# Patient Record
Sex: FEMALE | Race: WHITE | NOT HISPANIC OR LATINO | URBAN - METROPOLITAN AREA
[De-identification: names, ages, dates, MRNs, and addresses within clinical notes are randomized per-mention and may not be internally consistent; named-entity substitution may affect disease eponyms.]

---

## 2019-07-22 ENCOUNTER — EMERGENCY (EMERGENCY)
Facility: HOSPITAL | Age: 79
LOS: 1 days | Discharge: HOME | End: 2019-07-22
Attending: INTERNAL MEDICINE | Admitting: INTERNAL MEDICINE
Payer: MEDICARE

## 2019-07-22 VITALS
RESPIRATION RATE: 20 BRPM | DIASTOLIC BLOOD PRESSURE: 53 MMHG | HEART RATE: 87 BPM | OXYGEN SATURATION: 96 % | SYSTOLIC BLOOD PRESSURE: 114 MMHG

## 2019-07-22 VITALS
DIASTOLIC BLOOD PRESSURE: 74 MMHG | RESPIRATION RATE: 18 BRPM | TEMPERATURE: 98 F | HEART RATE: 97 BPM | OXYGEN SATURATION: 93 % | WEIGHT: 130.07 LBS | SYSTOLIC BLOOD PRESSURE: 154 MMHG

## 2019-07-22 LAB
ALBUMIN SERPL ELPH-MCNC: 4 G/DL — SIGNIFICANT CHANGE UP (ref 3.3–5)
ALP SERPL-CCNC: 94 U/L — SIGNIFICANT CHANGE UP (ref 40–120)
ALT FLD-CCNC: 24 U/L DA — SIGNIFICANT CHANGE UP (ref 10–45)
ANION GAP SERPL CALC-SCNC: 9 MMOL/L — SIGNIFICANT CHANGE UP (ref 5–17)
APTT BLD: 28 SEC — SIGNIFICANT CHANGE UP (ref 27.5–36.3)
AST SERPL-CCNC: 22 U/L — SIGNIFICANT CHANGE UP (ref 10–40)
BASOPHILS # BLD AUTO: 0.04 K/UL — SIGNIFICANT CHANGE UP (ref 0–0.2)
BASOPHILS NFR BLD AUTO: 0.3 % — SIGNIFICANT CHANGE UP (ref 0–2)
BILIRUB SERPL-MCNC: 0.6 MG/DL — SIGNIFICANT CHANGE UP (ref 0.2–1.2)
BUN SERPL-MCNC: 18 MG/DL — SIGNIFICANT CHANGE UP (ref 7–23)
CALCIUM SERPL-MCNC: 10.2 MG/DL — SIGNIFICANT CHANGE UP (ref 8.4–10.5)
CHLORIDE SERPL-SCNC: 105 MMOL/L — SIGNIFICANT CHANGE UP (ref 96–108)
CO2 SERPL-SCNC: 27 MMOL/L — SIGNIFICANT CHANGE UP (ref 22–31)
CREAT SERPL-MCNC: 0.83 MG/DL — SIGNIFICANT CHANGE UP (ref 0.5–1.3)
EOSINOPHIL # BLD AUTO: 0.11 K/UL — SIGNIFICANT CHANGE UP (ref 0–0.5)
EOSINOPHIL NFR BLD AUTO: 0.8 % — SIGNIFICANT CHANGE UP (ref 0–6)
GLUCOSE SERPL-MCNC: 109 MG/DL — HIGH (ref 70–99)
HCT VFR BLD CALC: 33.8 % — LOW (ref 34.5–45)
HGB BLD-MCNC: 12.2 G/DL — SIGNIFICANT CHANGE UP (ref 11.5–15.5)
IMM GRANULOCYTES NFR BLD AUTO: 0.4 % — SIGNIFICANT CHANGE UP (ref 0–1.5)
INR BLD: 1.06 RATIO — SIGNIFICANT CHANGE UP (ref 0.88–1.16)
LYMPHOCYTES # BLD AUTO: 27.3 % — SIGNIFICANT CHANGE UP (ref 13–44)
LYMPHOCYTES # BLD AUTO: 3.76 K/UL — HIGH (ref 1–3.3)
MCHC RBC-ENTMCNC: 36.1 GM/DL — HIGH (ref 32–36)
MCHC RBC-ENTMCNC: 41.1 PG — HIGH (ref 27–34)
MCV RBC AUTO: 113.8 FL — HIGH (ref 80–100)
MONOCYTES # BLD AUTO: 1.54 K/UL — HIGH (ref 0–0.9)
MONOCYTES NFR BLD AUTO: 11.2 % — SIGNIFICANT CHANGE UP (ref 2–14)
NEUTROPHILS # BLD AUTO: 8.25 K/UL — HIGH (ref 1.8–7.4)
NEUTROPHILS NFR BLD AUTO: 60 % — SIGNIFICANT CHANGE UP (ref 43–77)
NRBC # BLD: 0 /100 WBCS — SIGNIFICANT CHANGE UP (ref 0–0)
PLATELET # BLD AUTO: 399 K/UL — SIGNIFICANT CHANGE UP (ref 150–400)
POTASSIUM SERPL-MCNC: 3.6 MMOL/L — SIGNIFICANT CHANGE UP (ref 3.5–5.3)
POTASSIUM SERPL-SCNC: 3.6 MMOL/L — SIGNIFICANT CHANGE UP (ref 3.5–5.3)
PROT SERPL-MCNC: 8 G/DL — SIGNIFICANT CHANGE UP (ref 6–8.3)
PROTHROM AB SERPL-ACNC: 11.9 SEC — SIGNIFICANT CHANGE UP (ref 10–12.9)
RBC # BLD: 2.97 M/UL — LOW (ref 3.8–5.2)
RBC # FLD: 14.5 % — SIGNIFICANT CHANGE UP (ref 10.3–14.5)
SODIUM SERPL-SCNC: 141 MMOL/L — SIGNIFICANT CHANGE UP (ref 135–145)
TROPONIN I SERPL-MCNC: <.017 NG/ML — LOW (ref 0.02–0.06)
WBC # BLD: 13.76 K/UL — HIGH (ref 3.8–10.5)
WBC # FLD AUTO: 13.76 K/UL — HIGH (ref 3.8–10.5)

## 2019-07-22 PROCEDURE — 93005 ELECTROCARDIOGRAM TRACING: CPT

## 2019-07-22 PROCEDURE — 96368 THER/DIAG CONCURRENT INF: CPT

## 2019-07-22 PROCEDURE — 85730 THROMBOPLASTIN TIME PARTIAL: CPT

## 2019-07-22 PROCEDURE — 85610 PROTHROMBIN TIME: CPT

## 2019-07-22 PROCEDURE — 99284 EMERGENCY DEPT VISIT MOD MDM: CPT | Mod: 25

## 2019-07-22 PROCEDURE — 94640 AIRWAY INHALATION TREATMENT: CPT

## 2019-07-22 PROCEDURE — 93010 ELECTROCARDIOGRAM REPORT: CPT

## 2019-07-22 PROCEDURE — 85027 COMPLETE CBC AUTOMATED: CPT

## 2019-07-22 PROCEDURE — 71250 CT THORAX DX C-: CPT

## 2019-07-22 PROCEDURE — 71250 CT THORAX DX C-: CPT | Mod: 26

## 2019-07-22 PROCEDURE — 36415 COLL VENOUS BLD VENIPUNCTURE: CPT

## 2019-07-22 PROCEDURE — 71045 X-RAY EXAM CHEST 1 VIEW: CPT

## 2019-07-22 PROCEDURE — 80053 COMPREHEN METABOLIC PANEL: CPT

## 2019-07-22 PROCEDURE — 96365 THER/PROPH/DIAG IV INF INIT: CPT

## 2019-07-22 PROCEDURE — 84484 ASSAY OF TROPONIN QUANT: CPT

## 2019-07-22 PROCEDURE — 99284 EMERGENCY DEPT VISIT MOD MDM: CPT

## 2019-07-22 PROCEDURE — 71045 X-RAY EXAM CHEST 1 VIEW: CPT | Mod: 26

## 2019-07-22 RX ORDER — DEXAMETHASONE 0.5 MG/5ML
10 ELIXIR ORAL ONCE
Refills: 0 | Status: COMPLETED | OUTPATIENT
Start: 2019-07-22 | End: 2019-07-22

## 2019-07-22 RX ORDER — IPRATROPIUM/ALBUTEROL SULFATE 18-103MCG
2 AEROSOL WITH ADAPTER (GRAM) INHALATION
Qty: 1 | Refills: 0
Start: 2019-07-22 | End: 2019-08-20

## 2019-07-22 RX ORDER — CEFTRIAXONE 500 MG/1
1000 INJECTION, POWDER, FOR SOLUTION INTRAMUSCULAR; INTRAVENOUS ONCE
Refills: 0 | Status: COMPLETED | OUTPATIENT
Start: 2019-07-22 | End: 2019-07-22

## 2019-07-22 RX ORDER — IPRATROPIUM/ALBUTEROL SULFATE 18-103MCG
3 AEROSOL WITH ADAPTER (GRAM) INHALATION
Refills: 0 | Status: COMPLETED | OUTPATIENT
Start: 2019-07-22 | End: 2019-07-22

## 2019-07-22 RX ADMIN — Medication 3 MILLILITER(S): at 14:41

## 2019-07-22 RX ADMIN — CEFTRIAXONE 100 MILLIGRAM(S): 500 INJECTION, POWDER, FOR SOLUTION INTRAMUSCULAR; INTRAVENOUS at 14:44

## 2019-07-22 RX ADMIN — Medication 102 MILLIGRAM(S): at 14:44

## 2019-07-22 RX ADMIN — CEFTRIAXONE 1000 MILLIGRAM(S): 500 INJECTION, POWDER, FOR SOLUTION INTRAMUSCULAR; INTRAVENOUS at 15:15

## 2019-07-22 RX ADMIN — Medication 10 MILLIGRAM(S): at 15:05

## 2019-07-22 NOTE — ED PROVIDER NOTE - CLINICAL SUMMARY MEDICAL DECISION MAKING FREE TEXT BOX
79 yr old female with hx of HTN, multiple myeloma presents with productive cough, worsening over the last 2- 3 weeks. Pt seen by pmd and was on Augmentin, and then doxycycline without improvement. reports " I feel its stuck in my chest". Pt was sent to ED for further eval. Denies any fever, chills, n/v/d or any other symptoms. diminished lung sounds and diffuse wheeze- which improved after duo terrence duponts. ct chest showing concerns for possible neoplasm, offered admission to pt and pt refused. Pt to continue doxy, inhaler and medrol dose sent to pharmacy. pt discharged and to follow up with pulmonology and pmd.

## 2019-07-22 NOTE — ED PROVIDER NOTE - CARE PLAN
Principal Discharge DX:	COPD exacerbation Principal Discharge DX:	COPD exacerbation  Secondary Diagnosis:	Lung mass

## 2019-07-22 NOTE — ED PROVIDER NOTE - CARE PROVIDER_API CALL
Marquise Forrester ()  Critical Care Medicine; Internal Medicine; Pulmonary Disease  [address] [facility] [address]  [place] [address]  Phone: [phone]  Fax: [phone]  Follow Up Time:     Jose Eduardo Brady)  Internal Medicine; Pulmonary Disease  [address] [place] [address]  Phone: [phone]  Fax: [phone]  Follow Up Time:

## 2019-07-22 NOTE — ED ADULT TRIAGE NOTE - CHIEF COMPLAINT QUOTE
pt presents to ED with c/o symptoms of bronchitis that began 2 weeks ago. the pt received antibiotics (doxycycline, and amox-clav) from PMD without any improvement.

## 2019-07-22 NOTE — ED PROVIDER NOTE - OBJECTIVE STATEMENT
79 yr old female with hx of HTN, multiple myleoma 79 yr old female with hx of HTN, multiple myeloma 79 yr old female with hx of HTN, multiple myeloma presents with productive cough, worsening over the last 2- 3 weeks. Pt seen by pmd and was on Augmentin, and then doxycycline without improvement. reports " I feel its stuck in my chest". Pt was sent to ED for further eval. Denies any fever, chills, n/v/d or any other symptoms.

## 2019-07-22 NOTE — ED PROVIDER NOTE - ATTENDING CONTRIBUTION TO CARE
79 yr old female with hx of HTN, multiple myeloma presents with productive cough, worsening over the last 2- 3 weeks. Pt seen by pmd and was on Augmentin, and then doxycycline without improvement. reports " I feel its stuck in my chest". Pt was sent to ED for further eval. Denies any fever, chills, n/v/d or any other symptoms. diminished lung sounds and diffuse wheeze- which improved after duo nebs, steriods. ct chest showing concerns for possible neoplasm, offered admission to pt and pt refused. Pt to continue doxy, inhaler and medrol dose sent to pharmacy. pt discharged and to follow up with pulmonology and pmd.  pt referred to pulmonology  Dr. Conrad:  I have reviewed and discussed with the PA/ resident the case specifics, including the history, physical assessment, evaluation, conclusion, laboratory results, and medical plan. I agree with the contents, and conclusions. I have personally examined, and interviewed the patient.

## 2019-07-22 NOTE — ED PROVIDER NOTE - NSFOLLOWUPINSTRUCTIONS_ED_ALL_ED_FT
Continue taking doxycycline as prescribed.   Take medrol dose- steroid pack as directed   Use inhaler as prescribed   return to the ED if any worsening or persistent symptoms   follow up with your pmd - and show copies of ED results, follow up with a pulmonologist.     COPD (Chronic Obstructive Pulmonary Disease)    WHAT YOU NEED TO KNOW:    COPD (chronic obstructive pulmonary disease) can get worse quickly. Your healthcare providers will help you create a care plan to use at home. The plan will give directions on how to prevent or manage shortness of breath. Your family members or anyone who cares for you will also get directions to help you.Inspiration and Expiration         DISCHARGE INSTRUCTIONS:    Call your local emergency number (911 in the [place]) if:     You feel lightheaded, short of breath, and have chest pain.        Call your doctor if:     You are confused, dizzy, or feel faint.      Your arm or leg feels warm, tender, and painful. It may look swollen and red.      You cough up blood.      You have increased shortness of breath.      You need more medicine than usual to control your symptoms.      You are coughing or wheezing more than usual.      You are coughing up more mucus, or it has a new color or odor.      You gain more than 3 pounds in a week.      You have a fever, a runny or stuffy nose, and a sore throat, or other cold or flu symptoms.      Your skin, lips, or nails start to turn blue.      You have swelling in your legs or ankles.      You are very tired or weak for more than a day.      You notice changes in your mood, or changes in your ability to think or concentrate.      You have questions or concerns about your condition or care.    Medicines:     Short-acting bronchodilators may be called rescue inhalers or relievers. They relieve sudden, severe symptoms and start to work right away.      Long-acting bronchodilators may be called controllers. This medicine helps open the airways over time, and is used to decrease and prevent breathing problems. Long-acting bronchodilators should not be used to treat sudden, severe symptoms, such as trouble breathing.      Take your medicine as directed. Contact your healthcare provider if you think your medicine is not helping or if you have side effects. Tell him or her if you are allergic to any medicine. Keep a list of the medicines, vitamins, and herbs you take. Include the amounts, and when and why you take them. Bring the list or the pill bottles to follow-up visits. Carry your medicine list with you in case of an emergency.    Help make breathing easier:     Use pursed-lip breathing any time you feel short of breath. Take a deep breath in through your nose. Slowly breathe out through your mouth with your lips pursed. Try to take 2 times as long to breathe out as to breathe in. This helps you get rid of as much air from your lungs as possible. You can also practice this breathing pattern while you bend, lift, climb stairs, or exercise. It slows down your breathing and helps move more air in and out of your lungs.Breathe in Breathe out           Avoid anything that makes your symptoms worse. Stay out of high altitudes and places with high humidity. Stay inside, or cover your mouth and nose with a scarf when you are outside in cold weather. Stay inside on days when air pollution or pollen counts are high. Do not use aerosol sprays such as deodorant, bug spray, and hairspray.      Exercise daily. Exercise for at least 20 minutes each day to help increase your energy and decrease shortness of breath. Talk to your healthcare provider about the best exercise plan for you.Walking for Exercise         Manage COPD and help prevent exacerbations: COPD is a serious condition that gets worse over time. A COPD exacerbation means your symptoms suddenly get worse. It is important to prevent exacerbations. An exacerbation can cause more lung damage. COPD cannot be cured, but you can take action to feel better and prevent exacerbations:     Do not smoke.     If you currently smoke, quitting is the best way to keep COPD from getting worse. Nicotine and other substances can cause lung irritation or damage and make it harder for you to breathe. Do not use e-cigarettes or smokeless tobacco. They still contain nicotine. It may be hard to quit smoking. Your healthcare provider can help you find resources if you need help to quit. For support and more information:   [employer]  Phone: 1-794.723.8202  Web Address: www.[employer]          Avoid secondhand smoke. This is smoke another person exhales. Even if you have never smoked or have quit, it is important to avoid secondhand smoke. This smoke can also cause lung damage or trigger an exacerbation.      Go to pulmonary rehabilitation (rehab) if directed. Rehab is a program run by specialists who help you learn to manage COPD. Examples include a pulmonologist (lung specialist), dietitian, or exercise therapist. The specialists will help you make a plan to avoid triggers that cause an exacerbation.      Take your medicines as directed. Refill your medicines before you are out so that you do not miss a dose. Ask your healthcare provider if you have any questions on how to take your medicines.      Protect yourself from germs. Germs can get into your lungs and cause an infection. An infection in your lungs can create more mucus and make it harder to breathe. An infection can also create swelling in your airway and prevent air from getting in. You can decrease your risk for infection by doing the following:   Wash your hands often with soap and water. Carry germ-killing gel with you. You can use the gel to clean your hands when soap and water are not available. Handwashing           Do not touch your eyes, nose, or mouth unless you have washed your hands first.      Always cover your mouth when you cough. Cough into a tissue or your shirtsleeve so you do not spread germs from your hands.      Try to avoid people who have a cold or the flu. If you are sick, stay away from others as much as possible.      Drink more liquid. Liquid will help to keep your air passages moist and help you cough up mucus. Ask how much liquid to drink each day and which liquids are best for you.      Ask about vaccines. Influenza (the flu) and pneumonia can become life-threatening for a person who has COPD. Get a flu vaccine each year as soon as it becomes available. The pneumonia vaccine may be given every 5 years, or as directed. Ask about other vaccines you may need and when to get them.    Make decisions about your choices for future treatment: Ask for information about advanced medical directives and living juarez. These documents help you write down your choices for treatment and for end-of-life care, such as hospice. It is best to complete them when you feel well and can think clearly about your wishes. The information can then be kept for future use if you are in the hospital or become very ill.    Follow up with your doctor as directed: You may need more tests. Your doctor may refer you to a specialist, depending on your needs. Some specialist services may be available through your pulmonary rehab program. Your doctor may also refer you to home health care or palliative (comfort) care. Write down your questions so you remember to ask them during your visits.

## 2019-07-22 NOTE — ED ADULT NURSE NOTE - OBJECTIVE STATEMENT
79 yr old female to ED with +SOB x 2 1/2 wks. Pt states that she is currently taking doxycycline for Bronchitis. +CORONADO. Pt called PMD this am and reported chest pain. Sent by PMD for further evaluation. Pt states that she really does not have pain but feels like she has "mucus" stuck in chest.   No fever or chills.  Pt was smoking cigarettes up until 2wks ago.  No acute resp distress noted. Decreased breathe sounds noted to bases. Abd soft, NT, ND. +BS. +PP. No LE edema noted. [place]. Skin warm, dry and intact. Safety maintained. 79 yr old female to ED with +SOB x 2 1/2 wks. Pt states that she is currently taking doxycycline for Bronchitis. +CORONADO. Pt called PMD this am and reported chest pain. Sent by PMD for further evaluation. Pt states that she really does not have pain but feels like she has "mucus" stuck in chest.   No fever or chills.  Pt was smoking cigarettes up until 2wks ago.  No acute resp distress noted. Decreased breathe sounds noted to bases and wheezing. Abd soft, NT, ND. +BS. +PP. No LE edema noted. [place]. Skin warm, dry and intact. Safety maintained.

## 2019-07-31 ENCOUNTER — APPOINTMENT (OUTPATIENT)
Dept: PULMONOLOGY | Facility: CLINIC | Age: 79
End: 2019-07-31
Payer: MEDICARE

## 2019-07-31 VITALS
DIASTOLIC BLOOD PRESSURE: 60 MMHG | SYSTOLIC BLOOD PRESSURE: 118 MMHG | BODY MASS INDEX: 22.66 KG/M2 | OXYGEN SATURATION: 97 % | HEART RATE: 93 BPM | HEIGHT: 61 IN | WEIGHT: 120 LBS

## 2019-07-31 DIAGNOSIS — N28.89 HYPERTENSION SECONDARY TO OTHER RENAL DISORDERS: ICD-10-CM

## 2019-07-31 DIAGNOSIS — Z82.49 FAMILY HISTORY OF ISCHEMIC HEART DISEASE AND OTHER DISEASES OF THE CIRCULATORY SYSTEM: ICD-10-CM

## 2019-07-31 DIAGNOSIS — Z86.79 PERSONAL HISTORY OF OTHER DISEASES OF THE CIRCULATORY SYSTEM: ICD-10-CM

## 2019-07-31 DIAGNOSIS — I15.1 HYPERTENSION SECONDARY TO OTHER RENAL DISORDERS: ICD-10-CM

## 2019-07-31 PROCEDURE — 99205 OFFICE O/P NEW HI 60 MIN: CPT | Mod: 25

## 2019-07-31 PROCEDURE — 94729 DIFFUSING CAPACITY: CPT

## 2019-07-31 PROCEDURE — 94060 EVALUATION OF WHEEZING: CPT

## 2019-07-31 PROCEDURE — 99407 BEHAV CHNG SMOKING > 10 MIN: CPT

## 2019-07-31 PROCEDURE — ZZZZZ: CPT

## 2019-07-31 PROCEDURE — 94727 GAS DIL/WSHOT DETER LNG VOL: CPT

## 2019-07-31 RX ORDER — IPRATROPIUM BROMIDE AND ALBUTEROL SULFATE 2.5; .5 MG/3ML; MG/3ML
0.5-2.5 (3) SOLUTION RESPIRATORY (INHALATION)
Qty: 1 | Refills: 0 | Status: ACTIVE | COMMUNITY
Start: 2019-07-31 | End: 1900-01-01

## 2019-07-31 NOTE — ASSESSMENT
[FreeTextEntry1] : 79 year old female current everyday smoker recent treatment for COPD exacerbation/acute bronchitis presents for evaluation abnormal CT chest 7/22/2019, PFT reflective COPD/Asthma overlap syndrome\par \par Initiate Trelegy Ellipta daily as directed\par PET CT ordered\par Smoking Cessation counseling\par Nebulizer and medications ordered \par Suggest Cardiology assessment \par 6 minute walk next visit\par \par Follow up after PET

## 2019-07-31 NOTE — HISTORY OF PRESENT ILLNESS
[FreeTextEntry1] : Patient is a 79 year old female current everyday smoker Hx HTN, currently under surveillance for possible multiple myeloma, presents to [facility] for evaluation abnormal CT chest 7/22/2019 revealing 1.5 cm solid nodular opacity, surrounding ground glass opacity.    Patient recently treated for acute bronchitis.  PFT 7/31/2019 revealing asthma/OPD overlap syndrome.  Patient carries 60 pack year smoking history.  She is here for evaluation of these findings.  Patient admits to cough, productive at times, decreased appetite and shortness of breath.  She denies hemoptysis, chest pain or systemic complaints.

## 2019-07-31 NOTE — PHYSICAL EXAM
[General Appearance - Well Developed] : well developed [Well Groomed] : well groomed [General Appearance - Well Nourished] : well nourished [General Appearance - In No Acute Distress] : no acute distress [Normal Conjunctiva] : the conjunctiva exhibited no abnormalities [] : the neck was supple [Jugular Venous Distention Increased] : there was no jugular-venous distention [Heart Sounds] : normal S1 and S2 [Respiration, Rhythm And Depth] : normal respiratory rhythm and effort [Auscultation Breath Sounds / Voice Sounds] : lungs were clear to auscultation bilaterally [Abdomen Soft] : soft [Abdomen Tenderness] : non-tender [Abnormal Walk] : normal gait [Nail Clubbing] : no clubbing of the fingernails [Cyanosis, Localized] : no localized cyanosis [Non-Pitting] : non-pitting [Skin Color & Pigmentation] : normal skin color and pigmentation [Cranial Nerves] : cranial nerves 2-12 were intact [Oriented To Time, Place, And Person] : oriented to person, place, and time [Erythema] : no erythema of the pharynx [FreeTextEntry1] : Diminished breath sounds bilaterally

## 2019-07-31 NOTE — REVIEW OF SYSTEMS
[Poor Appetite] : poor appetite [Cough] : cough [Sputum] : sputum  [Dyspnea] : dyspnea [Chest Tightness] : chest tightness [Hypertension] : ~T hypertension [As Noted in HPI] : as noted in HPI [Negative] : Sleep Disorder

## 2019-07-31 NOTE — PROCEDURE
[FreeTextEntry1] : PFT 7/31/2019 personally reviewed moderate obstructive and mild restrictive ventilatory defect with moderate gas exchange abnormality and significant bronchodilator response.\par \par CT chest 7/22/2019 personally reviewed Poorly defined 1.5 cm mixed ground glass and solid nodular opacity SERA Small centrilobular tree in bud nodular opacities, mild bilateral emphysematous disease

## 2019-08-01 ENCOUNTER — FORM ENCOUNTER (OUTPATIENT)
Age: 79
End: 2019-08-01

## 2019-08-01 PROBLEM — I10 ESSENTIAL (PRIMARY) HYPERTENSION: Chronic | Status: ACTIVE | Noted: 2019-07-22

## 2019-08-02 ENCOUNTER — APPOINTMENT (OUTPATIENT)
Dept: NUCLEAR MEDICINE | Facility: CLINIC | Age: 79
End: 2019-08-02
Payer: MEDICARE

## 2019-08-02 ENCOUNTER — OUTPATIENT (OUTPATIENT)
Dept: OUTPATIENT SERVICES | Facility: HOSPITAL | Age: 79
LOS: 1 days | End: 2019-08-02
Payer: MEDICARE

## 2019-08-02 DIAGNOSIS — Z00.8 ENCOUNTER FOR OTHER GENERAL EXAMINATION: ICD-10-CM

## 2019-08-02 PROCEDURE — 78815 PET IMAGE W/CT SKULL-THIGH: CPT | Mod: 26,PI

## 2019-08-02 PROCEDURE — A9552: CPT

## 2019-08-02 PROCEDURE — 78815 PET IMAGE W/CT SKULL-THIGH: CPT

## 2019-08-12 ENCOUNTER — APPOINTMENT (OUTPATIENT)
Age: 79
End: 2019-08-12
Payer: MEDICARE

## 2019-08-12 VITALS
WEIGHT: 123 LBS | BODY MASS INDEX: 23.22 KG/M2 | HEART RATE: 78 BPM | OXYGEN SATURATION: 98 % | SYSTOLIC BLOOD PRESSURE: 115 MMHG | DIASTOLIC BLOOD PRESSURE: 70 MMHG | HEIGHT: 61 IN | RESPIRATION RATE: 15 BRPM

## 2019-08-12 DIAGNOSIS — F17.200 NICOTINE DEPENDENCE, UNSPECIFIED, UNCOMPLICATED: ICD-10-CM

## 2019-08-12 PROCEDURE — 99406 BEHAV CHNG SMOKING 3-10 MIN: CPT

## 2019-08-12 PROCEDURE — 99215 OFFICE O/P EST HI 40 MIN: CPT | Mod: 25

## 2019-08-12 NOTE — PHYSICAL EXAM
[General Appearance - Well Developed] : well developed [General Appearance - Well Nourished] : well nourished [Well Groomed] : well groomed [General Appearance - In No Acute Distress] : no acute distress [Normal Conjunctiva] : the conjunctiva exhibited no abnormalities [] : the neck was supple [Jugular Venous Distention Increased] : there was no jugular-venous distention [Heart Sounds] : normal S1 and S2 [Respiration, Rhythm And Depth] : normal respiratory rhythm and effort [Auscultation Breath Sounds / Voice Sounds] : lungs were clear to auscultation bilaterally [Abdomen Soft] : soft [Abdomen Tenderness] : non-tender [Nail Clubbing] : no clubbing of the fingernails [Abnormal Walk] : normal gait [Cyanosis, Localized] : no localized cyanosis [Non-Pitting] : non-pitting [Cranial Nerves] : cranial nerves 2-12 were intact [Oriented To Time, Place, And Person] : oriented to person, place, and time [Skin Color & Pigmentation] : normal skin color and pigmentation [Erythema] : no erythema of the pharynx [FreeTextEntry1] : Diminished breath sounds bilaterally

## 2019-08-12 NOTE — HISTORY OF PRESENT ILLNESS
[FreeTextEntry1] : Patient is a 79 year old female current everyday smoker Hx HTN, currently under surveillance for possible multiple myeloma, presents to [facility] for evaluation abnormal CT chest 7/22/2019 revealing 1.5 cm solid nodular opacity, surrounding ground glass opacity.    Subsequent PET CT 8/2/2019 revealed indeterminate FDG avid 1.5 cm SERA ground glass nodule.  Patient is here for follow up and discussion of plan of care.

## 2019-08-12 NOTE — ASSESSMENT
[FreeTextEntry1] : 79 year old female current everyday smoker recent treatment for COPD exacerbation/acute bronchitis presents for evaluation abnormal CT chest 7/22/2019, PFT reflective COPD/Asthma overlap syndrome\par \par Continue Trelegy Ellipta daily as directed\par Smoking Cessation counseling\par Nebulizer and medications ordered \par Thoracic Surgery evaluation, Dr. Jean Baptiste  Wednesday August 14, 2019\par \par Follow up after PET

## 2019-08-12 NOTE — PROCEDURE
[FreeTextEntry1] : PFT 7/31/2019 personally reviewed moderate obstructive and mild restrictive ventilatory defect with moderate gas exchange abnormality and significant bronchodilator response.\par \par CT chest 7/22/2019 personally reviewed Poorly defined 1.5 cm mixed ground glass and solid nodular opacity SERA Small centrilobular tree in bud nodular opacities, mild bilateral emphysematous disease  \par \par PET CT 8/2/2019 indeterminate FDG avid mixed ground glass SERA nodule

## 2019-08-15 ENCOUNTER — APPOINTMENT (OUTPATIENT)
Dept: THORACIC SURGERY | Facility: CLINIC | Age: 79
End: 2019-08-15
Payer: MEDICARE

## 2019-08-15 VITALS
WEIGHT: 123 LBS | RESPIRATION RATE: 16 BRPM | BODY MASS INDEX: 21 KG/M2 | HEART RATE: 92 BPM | TEMPERATURE: 98.3 F | HEIGHT: 64 IN | OXYGEN SATURATION: 97 % | DIASTOLIC BLOOD PRESSURE: 75 MMHG | SYSTOLIC BLOOD PRESSURE: 134 MMHG

## 2019-08-15 PROCEDURE — 99205 OFFICE O/P NEW HI 60 MIN: CPT

## 2019-08-15 NOTE — HISTORY OF PRESENT ILLNESS
[FreeTextEntry1] : 79 year old female, recently quit smoking (60 PY), presents today for thoracic surgery consultation for lung nodule noted on recent imaging. She was recently workup fro URI.  Abnormal CT scan prompted PETCT scan. She was referred by Dr Szymanski. Of note, she is followed by Dr Hancock fro surveillance of possible multiple myeloma. \par \par PETCT scan 8/2/19 reveals 1.5 x 0.8 cm mixed groundglass nodule periphery left upper lobe (SUV \par 2.8; image 97). Non-FDG avid interstitial thickening right middle lobe, unchanged since 2015. \par \par CT chest scan 7/22/19 reveals mild bilateral emphysematous disease. There is right apical scarring. \par There is scarring/fibrosis anterior aspect right middle lobe extending to the pleural surface. There is a poorly defined, approximately 1.5 cm mixed solid and groundglass nodule opacity in the periphery of the left upper lobe. There are scattered small poorly defined centrilobular nodular opacities, some of which demonstrate branching tree-in-bud appearance; findings which may reflect infectious/inflammatory airway disease. [place], subcentimeter pretracheal, AP and mediastinal and subcarinal mediastinal lymph nodes.\par \par She reports shortness of breath with moderate exertion and occasional productive cough. The patient denies fever, chills, dysphagia or hemoptysis.

## 2019-08-15 NOTE — PHYSICAL EXAM
[General Appearance - In No Acute Distress] : in no acute distress [General Appearance - Alert] : alert [General Appearance - Well-Appearing] : healthy appearing [Sclera] : the sclera and conjunctiva were normal [Extraocular Movements] : extraocular movements were intact [Hearing Threshold Finger Rub Not [place]] : hearing was normal [Examination Of The Oral Cavity] : the lips and gums were normal [Neck Appearance] : the appearance of the neck was normal [Jugular Venous Distention Increased] : there was no jugular-venous distention [Neck Cervical Mass (___cm)] : no neck mass was observed [] : no respiratory distress [Respiration, Rhythm And Depth] : normal respiratory rhythm and effort [Exaggerated Use Of Accessory Muscles For Inspiration] : no accessory muscle use [Auscultation Breath Sounds / Voice Sounds] : lungs were clear to auscultation bilaterally [Diminished Respiratory Excursion] : normal chest expansion [Heart Rate And Rhythm] : heart rate was normal and rhythm regular [Cervical Lymph Nodes Enlarged Posterior Bilaterally] : posterior cervical [Supraclavicular Lymph Nodes Enlarged Bilaterally] : supraclavicular [Cervical Lymph Nodes Enlarged Anterior Bilaterally] : anterior cervical [Abnormal Walk] : normal gait [No Focal Deficits] : no focal deficits [Skin Color & Pigmentation] : normal skin color and pigmentation [Impaired Insight] : insight and judgment were intact [Oriented To Time, Place, And Person] : oriented to person, place, and time [Affect] : the affect was normal [Mood] : the mood was normal

## 2019-08-19 ENCOUNTER — OUTPATIENT (OUTPATIENT)
Dept: OUTPATIENT SERVICES | Facility: HOSPITAL | Age: 79
LOS: 1 days | End: 2019-08-19

## 2019-08-19 VITALS
DIASTOLIC BLOOD PRESSURE: 70 MMHG | SYSTOLIC BLOOD PRESSURE: 120 MMHG | OXYGEN SATURATION: 95 % | HEART RATE: 92 BPM | RESPIRATION RATE: 16 BRPM | TEMPERATURE: 98 F | HEIGHT: 62 IN | WEIGHT: 121.92 LBS

## 2019-08-19 DIAGNOSIS — R91.1 SOLITARY PULMONARY NODULE: ICD-10-CM

## 2019-08-19 LAB
ANION GAP SERPL CALC-SCNC: 15 MMO/L — HIGH (ref 7–14)
BLD GP AB SCN SERPL QL: NEGATIVE — SIGNIFICANT CHANGE UP
BUN SERPL-MCNC: 21 MG/DL — SIGNIFICANT CHANGE UP (ref 7–23)
CALCIUM SERPL-MCNC: 10.1 MG/DL — SIGNIFICANT CHANGE UP (ref 8.4–10.5)
CHLORIDE SERPL-SCNC: 100 MMOL/L — SIGNIFICANT CHANGE UP (ref 98–107)
CO2 SERPL-SCNC: 25 MMOL/L — SIGNIFICANT CHANGE UP (ref 22–31)
CREAT SERPL-MCNC: 0.73 MG/DL — SIGNIFICANT CHANGE UP (ref 0.5–1.3)
GLUCOSE SERPL-MCNC: 80 MG/DL — SIGNIFICANT CHANGE UP (ref 70–99)
HCT VFR BLD CALC: 32.6 % — LOW (ref 34.5–45)
HGB BLD-MCNC: 10.9 G/DL — LOW (ref 11.5–15.5)
MCHC RBC-ENTMCNC: 33.4 % — SIGNIFICANT CHANGE UP (ref 32–36)
MCHC RBC-ENTMCNC: 38.4 PG — HIGH (ref 27–34)
MCV RBC AUTO: 114.8 FL — HIGH (ref 80–100)
NRBC # FLD: 0.03 K/UL — SIGNIFICANT CHANGE UP (ref 0–0)
PLATELET # BLD AUTO: 589 K/UL — HIGH (ref 150–400)
PMV BLD: 10 FL — SIGNIFICANT CHANGE UP (ref 7–13)
POTASSIUM SERPL-MCNC: 3.3 MMOL/L — LOW (ref 3.5–5.3)
POTASSIUM SERPL-SCNC: 3.3 MMOL/L — LOW (ref 3.5–5.3)
RBC # BLD: 2.84 M/UL — LOW (ref 3.8–5.2)
RBC # FLD: 13.3 % — SIGNIFICANT CHANGE UP (ref 10.3–14.5)
RH IG SCN BLD-IMP: POSITIVE — SIGNIFICANT CHANGE UP
SODIUM SERPL-SCNC: 140 MMOL/L — SIGNIFICANT CHANGE UP (ref 135–145)
WBC # BLD: 16.14 K/UL — HIGH (ref 3.8–10.5)
WBC # FLD AUTO: 16.14 K/UL — HIGH (ref 3.8–10.5)

## 2019-08-19 RX ORDER — SODIUM CHLORIDE 9 MG/ML
1000 INJECTION, SOLUTION INTRAVENOUS
Refills: 0 | Status: DISCONTINUED | OUTPATIENT
Start: 2019-08-28 | End: 2019-09-03

## 2019-08-19 NOTE — H&P PST ADULT - PRIMARY CARE PROVIDER
Dr Mcdonald cardiologist   Dr Mcdonald cardiologist                                                                                                         Dr Szymanski pulmonary [phone]

## 2019-08-19 NOTE — H&P PST ADULT - NSICDXPROBLEM_GEN_ALL_CORE_FT
PROBLEM DIAGNOSES  Problem: Solitary lung nodule  Assessment and Plan: Pt is scheduled for flexible bronchoscopy, left video assisted thoracoscopy, lung resection for 8/28/19. Pre-op instructions provided. Pt given verbal and written instructions with teach back on chlorhexidine shampoo and pepcid. Pt verbalized understanding with return demonstration.     Possible multiple myeloma, pending last heme/onc note.  Pending cardiac evaluation, has appt on 8/22, due to SOB.  Pending echo and stress test from PMD.    Problem: Emphysema lung  Assessment and Plan: Pt instructed to take trelegy on the morning of procedure. PFTs and CT chest in chart.    Problem: Anxiety and depression  Assessment and Plan: Pt instructed to take lexapro on the morning of procedure    Problem: Hypertension  Assessment and Plan: Pt instructed to take diovan on the morning of procedure.

## 2019-08-19 NOTE — H&P PST ADULT - OTHER CARE PROVIDERS
Dr Ryan Torrez                                                                                                                          Dr Herring hematologist  [phone]

## 2019-08-19 NOTE — H&P PST ADULT - NEGATIVE OPHTHALMOLOGIC SYMPTOMS
no pain R/no loss of vision R/no pain L/no loss of vision L/no diplopia/no photophobia/no blurred vision L

## 2019-08-19 NOTE — H&P PST ADULT - MUSCULOSKELETAL
details… detailed exam ROM intact/no joint swelling/normal strength/no joint erythema/no calf tenderness/no joint warmth

## 2019-08-19 NOTE — H&P PST ADULT - NEGATIVE NEUROLOGICAL SYMPTOMS
no syncope/no tremors/no transient paralysis/no difficulty walking/no weakness/no generalized seizures/no focal seizures/no paresthesias

## 2019-08-19 NOTE — H&P PST ADULT - HISTORY OF PRESENT ILLNESS
79 year old female presents to presurgical testing with diagnosis of solitary pulmonary nodule scheduled for flexible bronchoscopy, left video assisted thoracoscopy, lung resection for 8/28/19. Pt with hx of emphysema, recently started on trelegy, found a left upper lung nodule during work up for URI. Pt quit smoking in 7/2019. Pt with SOB with activity and chronic cough.

## 2019-08-19 NOTE — H&P PST ADULT - NEGATIVE ENMT SYMPTOMS
no ear pain/no tinnitus/no hearing difficulty/no vertigo/no throat pain/no dysphagia/no nose bleeds/no sinus symptoms

## 2019-08-19 NOTE — H&P PST ADULT - RS GEN PE MLT RESP DETAILS PC
no rhonchi/breath sounds equal/no wheezes/respirations non-labored/clear to auscultation bilaterally/good air movement/no rales/airway patent

## 2019-08-19 NOTE — H&P PST ADULT - NSICDXPASTMEDICALHX_GEN_ALL_CORE_FT
PAST MEDICAL HISTORY:  Anxiety and depression     Arthritis     Emphysema lung     HTN (hypertension)     Lower back pain     Solitary pulmonary nodule

## 2019-08-22 ENCOUNTER — APPOINTMENT (OUTPATIENT)
Dept: CARDIOLOGY | Facility: CLINIC | Age: 79
End: 2019-08-22
Payer: MEDICARE

## 2019-08-22 ENCOUNTER — NON-APPOINTMENT (OUTPATIENT)
Age: 79
End: 2019-08-22

## 2019-08-22 VITALS
HEART RATE: 92 BPM | RESPIRATION RATE: 16 BRPM | OXYGEN SATURATION: 98 % | HEIGHT: 64 IN | WEIGHT: 122 LBS | BODY MASS INDEX: 20.83 KG/M2 | DIASTOLIC BLOOD PRESSURE: 70 MMHG | SYSTOLIC BLOOD PRESSURE: 139 MMHG

## 2019-08-22 VITALS — HEART RATE: 92 BPM | DIASTOLIC BLOOD PRESSURE: 68 MMHG | SYSTOLIC BLOOD PRESSURE: 130 MMHG

## 2019-08-22 PROCEDURE — 99204 OFFICE O/P NEW MOD 45 MIN: CPT

## 2019-08-22 PROCEDURE — 93000 ELECTROCARDIOGRAM COMPLETE: CPT

## 2019-08-22 RX ORDER — MELOXICAM 15 MG/1
15 TABLET ORAL
Refills: 0 | Status: ACTIVE | COMMUNITY

## 2019-08-22 RX ORDER — VALSARTAN 160 MG
CAPSULE ORAL
Refills: 0 | Status: DISCONTINUED | COMMUNITY
End: 2019-08-22

## 2019-08-22 RX ORDER — GABAPENTIN 600 MG/1
600 TABLET, COATED ORAL
Refills: 0 | Status: ACTIVE | COMMUNITY

## 2019-08-22 RX ORDER — FLUTICASONE FUROATE, UMECLIDINIUM BROMIDE AND VILANTEROL TRIFENATATE 100; 62.5; 25 UG/1; UG/1; UG/1
100-62.5-25 POWDER RESPIRATORY (INHALATION) DAILY
Qty: 1 | Refills: 3 | Status: DISCONTINUED | COMMUNITY
Start: 2019-07-31 | End: 2019-08-22

## 2019-08-22 NOTE — REASON FOR VISIT
[Consultation] : a consultation regarding [FreeTextEntry1] : This 79-year-old woman who presents for cardiac clearance for left VATS and lung resection. The patient has no known history of heart disease. She has history of hypertension. She is unaware of her lipid levels. She has no history of diabetes no history of alcohol use. The patient has smoked a half a pack to one pack of cigarettes for the past 60 years. Family history significant for father who  in his 70s from the effects of hypertension her mother  at 92 she has a brother who is alive and well at age 80. Patient has no history of angina pectoris congestive heart failure myocardial infarction bypass surgery with stents. She denies chest tightness shortness of breath palpitations dizziness or syncope. She had an echocardiogram at her primary physician's office several weeks ago. We have been unable to obtain a hard copy of the report to date. However the physician detected the patient's daughter that the patient's ejection fraction was 64%.

## 2019-08-22 NOTE — ASSESSMENT
[FreeTextEntry1] : In summary, the patient is a 79-year-old woman with no known history of heart disease who is scheduled for lung surgery for a nodule.\par \par Clinically there is no evidence of significant heart disease. Her physical exam she has no evidence of aortic stenosis or mitral insufficiency. Based on a verbal report her LV function is normal.\par \par At this time there is no absolute cardiac contraindication to the proposed procedure.

## 2019-08-22 NOTE — PHYSICAL EXAM
[Normal Appearance] : normal appearance [General Appearance - Well Developed] : well developed [Well Groomed] : well groomed [No Deformities] : no deformities [General Appearance - Well Nourished] : well nourished [General Appearance - In No Acute Distress] : no acute distress [Normal Jugular Venous A Waves Present] : normal jugular venous A waves present [Normal Jugular Venous V Waves Present] : normal jugular venous V waves present [No Jugular Venous Funez A Waves] : no jugular venous funez A waves [Normal Rate] : normal [Normal S1] : normal S1 [Normal S2] : normal S2 [S4] : no S4 [S3] : no S3 [No Murmur] : no murmurs heard [Right Carotid Bruit] : no bruit heard over the right carotid [Left Carotid Bruit] : no bruit heard over the left carotid [Right Femoral Bruit] : no bruit heard over the right femoral artery [Left Femoral Bruit] : no bruit heard over the left femoral artery [2+] : left 2+ [No Pitting Edema] : no pitting edema present [No Abnormalities] : the abdominal aorta was not enlarged and no bruit was heard [Respiration, Rhythm And Depth] : normal respiratory rhythm and effort [Auscultation Breath Sounds / Voice Sounds] : lungs were clear to auscultation bilaterally [Exaggerated Use Of Accessory Muscles For Inspiration] : no accessory muscle use [Abdomen Soft] : soft [Abdomen Tenderness] : non-tender [Abdomen Mass (___ Cm)] : no abdominal mass palpated [Abnormal Walk] : normal gait [Gait - Sufficient For Exercise Testing] : the gait was sufficient for exercise testing [Nail Clubbing] : no clubbing of the fingernails [Cyanosis, Localized] : no localized cyanosis [Petechial Hemorrhages (___cm)] : no petechial hemorrhages [Skin Color & Pigmentation] : normal skin color and pigmentation [] : no rash [No Venous Stasis] : no venous stasis [Skin Lesions] : no skin lesions [No Skin Ulcers] : no skin ulcer [No Xanthoma] : no  xanthoma was observed [Oriented To Time, Place, And Person] : oriented to person, place, and time [Affect] : the affect was normal [Mood] : the mood was normal [No Anxiety] : not feeling anxious

## 2019-08-23 PROBLEM — R91.1 SOLITARY PULMONARY NODULE: Chronic | Status: ACTIVE | Noted: 2019-08-19

## 2019-08-23 PROBLEM — M19.90 UNSPECIFIED OSTEOARTHRITIS, UNSPECIFIED SITE: Chronic | Status: ACTIVE | Noted: 2019-08-19

## 2019-08-23 PROBLEM — F41.9 ANXIETY DISORDER, UNSPECIFIED: Chronic | Status: ACTIVE | Noted: 2019-08-19

## 2019-08-23 PROBLEM — J43.9 EMPHYSEMA, UNSPECIFIED: Chronic | Status: ACTIVE | Noted: 2019-08-19

## 2019-08-23 PROBLEM — M54.5 LOW BACK PAIN: Chronic | Status: ACTIVE | Noted: 2019-08-19

## 2019-08-28 ENCOUNTER — INPATIENT (INPATIENT)
Facility: HOSPITAL | Age: 79
LOS: 6 days | Discharge: HOME | End: 2019-09-04
Attending: THORACIC SURGERY (CARDIOTHORACIC VASCULAR SURGERY) | Admitting: THORACIC SURGERY (CARDIOTHORACIC VASCULAR SURGERY)
Payer: MEDICARE

## 2019-08-28 ENCOUNTER — APPOINTMENT (OUTPATIENT)
Dept: THORACIC SURGERY | Facility: HOSPITAL | Age: 79
End: 2019-08-28

## 2019-08-28 ENCOUNTER — RESULT REVIEW (OUTPATIENT)
Age: 79
End: 2019-08-28

## 2019-08-28 VITALS
RESPIRATION RATE: 16 BRPM | WEIGHT: 121.92 LBS | HEIGHT: 62 IN | TEMPERATURE: 98 F | SYSTOLIC BLOOD PRESSURE: 154 MMHG | OXYGEN SATURATION: 100 % | DIASTOLIC BLOOD PRESSURE: 78 MMHG | HEART RATE: 77 BPM

## 2019-08-28 DIAGNOSIS — I10 ESSENTIAL (PRIMARY) HYPERTENSION: ICD-10-CM

## 2019-08-28 DIAGNOSIS — R91.1 SOLITARY PULMONARY NODULE: ICD-10-CM

## 2019-08-28 DIAGNOSIS — J43.9 EMPHYSEMA, UNSPECIFIED: ICD-10-CM

## 2019-08-28 DIAGNOSIS — F41.9 ANXIETY DISORDER, UNSPECIFIED: ICD-10-CM

## 2019-08-28 LAB — RH IG SCN BLD-IMP: POSITIVE — SIGNIFICANT CHANGE UP

## 2019-08-28 PROCEDURE — 88313 SPECIAL STAINS GROUP 2: CPT | Mod: 26

## 2019-08-28 PROCEDURE — 31622 DX BRONCHOSCOPE/WASH: CPT

## 2019-08-28 PROCEDURE — 99233 SBSQ HOSP IP/OBS HIGH 50: CPT

## 2019-08-28 PROCEDURE — 32663 THORACOSCOPY W/LOBECTOMY: CPT

## 2019-08-28 PROCEDURE — 71045 X-RAY EXAM CHEST 1 VIEW: CPT | Mod: 26

## 2019-08-28 PROCEDURE — 88342 IMHCHEM/IMCYTCHM 1ST ANTB: CPT | Mod: 26

## 2019-08-28 PROCEDURE — 88309 TISSUE EXAM BY PATHOLOGIST: CPT | Mod: 26

## 2019-08-28 PROCEDURE — 88341 IMHCHEM/IMCYTCHM EA ADD ANTB: CPT | Mod: 26

## 2019-08-28 PROCEDURE — 88331 PATH CONSLTJ SURG 1 BLK 1SPC: CPT | Mod: 26

## 2019-08-28 PROCEDURE — 88305 TISSUE EXAM BY PATHOLOGIST: CPT | Mod: 26

## 2019-08-28 PROCEDURE — 88304 TISSUE EXAM BY PATHOLOGIST: CPT | Mod: 26

## 2019-08-28 PROCEDURE — 88307 TISSUE EXAM BY PATHOLOGIST: CPT | Mod: 26

## 2019-08-28 PROCEDURE — 32674 THORACOSCOPY LYMPH NODE EXC: CPT

## 2019-08-28 RX ORDER — HYDROMORPHONE HYDROCHLORIDE 2 MG/ML
0.5 INJECTION INTRAMUSCULAR; INTRAVENOUS; SUBCUTANEOUS
Refills: 0 | Status: DISCONTINUED | OUTPATIENT
Start: 2019-08-28 | End: 2019-09-01

## 2019-08-28 RX ORDER — BUTORPHANOL TARTRATE 2 MG/ML
0.12 INJECTION, SOLUTION INTRAMUSCULAR; INTRAVENOUS EVERY 6 HOURS
Refills: 0 | Status: DISCONTINUED | OUTPATIENT
Start: 2019-08-28 | End: 2019-09-01

## 2019-08-28 RX ORDER — HYDROMORPHONE HYDROCHLORIDE 2 MG/ML
30 INJECTION INTRAMUSCULAR; INTRAVENOUS; SUBCUTANEOUS
Refills: 0 | Status: DISCONTINUED | OUTPATIENT
Start: 2019-08-28 | End: 2019-09-01

## 2019-08-28 RX ORDER — HYDROMORPHONE HYDROCHLORIDE 2 MG/ML
0.5 INJECTION INTRAMUSCULAR; INTRAVENOUS; SUBCUTANEOUS
Refills: 0 | Status: DISCONTINUED | OUTPATIENT
Start: 2019-08-28 | End: 2019-08-29

## 2019-08-28 RX ORDER — ESCITALOPRAM OXALATE 10 MG/1
20 TABLET, FILM COATED ORAL DAILY
Refills: 0 | Status: DISCONTINUED | OUTPATIENT
Start: 2019-08-28 | End: 2019-09-04

## 2019-08-28 RX ORDER — ONDANSETRON 8 MG/1
4 TABLET, FILM COATED ORAL EVERY 6 HOURS
Refills: 0 | Status: DISCONTINUED | OUTPATIENT
Start: 2019-08-28 | End: 2019-09-01

## 2019-08-28 RX ORDER — IPRATROPIUM/ALBUTEROL SULFATE 18-103MCG
3 AEROSOL WITH ADAPTER (GRAM) INHALATION EVERY 6 HOURS
Refills: 0 | Status: DISCONTINUED | OUTPATIENT
Start: 2019-08-28 | End: 2019-08-31

## 2019-08-28 RX ORDER — ONDANSETRON 8 MG/1
4 TABLET, FILM COATED ORAL ONCE
Refills: 0 | Status: DISCONTINUED | OUTPATIENT
Start: 2019-08-28 | End: 2019-09-04

## 2019-08-28 RX ORDER — DOCUSATE SODIUM 100 MG
100 CAPSULE ORAL THREE TIMES A DAY
Refills: 0 | Status: DISCONTINUED | OUTPATIENT
Start: 2019-08-28 | End: 2019-08-31

## 2019-08-28 RX ORDER — DIPHENHYDRAMINE HCL 50 MG
12.5 CAPSULE ORAL EVERY 4 HOURS
Refills: 0 | Status: DISCONTINUED | OUTPATIENT
Start: 2019-08-28 | End: 2019-09-01

## 2019-08-28 RX ORDER — SENNA PLUS 8.6 MG/1
2 TABLET ORAL AT BEDTIME
Refills: 0 | Status: DISCONTINUED | OUTPATIENT
Start: 2019-08-28 | End: 2019-08-31

## 2019-08-28 RX ORDER — GABAPENTIN 400 MG/1
300 CAPSULE ORAL DAILY
Refills: 0 | Status: DISCONTINUED | OUTPATIENT
Start: 2019-08-28 | End: 2019-09-04

## 2019-08-28 RX ORDER — HYDROMORPHONE HYDROCHLORIDE 2 MG/ML
1 INJECTION INTRAMUSCULAR; INTRAVENOUS; SUBCUTANEOUS
Refills: 0 | Status: DISCONTINUED | OUTPATIENT
Start: 2019-08-28 | End: 2019-08-29

## 2019-08-28 RX ORDER — FAMOTIDINE 10 MG/ML
20 INJECTION INTRAVENOUS EVERY 12 HOURS
Refills: 0 | Status: DISCONTINUED | OUTPATIENT
Start: 2019-08-28 | End: 2019-09-04

## 2019-08-28 RX ORDER — METOCLOPRAMIDE HCL 10 MG
10 TABLET ORAL ONCE
Refills: 0 | Status: DISCONTINUED | OUTPATIENT
Start: 2019-08-28 | End: 2019-09-01

## 2019-08-28 RX ORDER — NALOXONE HYDROCHLORIDE 4 MG/.1ML
0.1 SPRAY NASAL
Refills: 0 | Status: DISCONTINUED | OUTPATIENT
Start: 2019-08-28 | End: 2019-09-01

## 2019-08-28 RX ADMIN — Medication 3 MILLILITER(S): at 22:06

## 2019-08-28 RX ADMIN — SODIUM CHLORIDE 30 MILLILITER(S): 9 INJECTION, SOLUTION INTRAVENOUS at 19:47

## 2019-08-28 RX ADMIN — SODIUM CHLORIDE 30 MILLILITER(S): 9 INJECTION, SOLUTION INTRAVENOUS at 14:01

## 2019-08-28 RX ADMIN — HYDROMORPHONE HYDROCHLORIDE 30 MILLILITER(S): 2 INJECTION INTRAMUSCULAR; INTRAVENOUS; SUBCUTANEOUS at 19:47

## 2019-08-28 NOTE — BRIEF OPERATIVE NOTE - NSICDXBRIEFPROCEDURE_GEN_ALL_CORE_FT
PROCEDURES:  Bronchoscopy, flexible, with bronchopulmonary lavage 28-Aug-2019 16:55:19  Salomon Parks  Biopsy, lung, using VATS 28-Aug-2019 16:54:45  Salomon Parks PROCEDURES:  Lobectomy, using VATS 28-Aug-2019 18:53:05  Vidal Estes  Bronchoscopy, flexible, with bronchopulmonary lavage 28-Aug-2019 16:55:19  Salomon Parks  Biopsy, lung, using VATS 28-Aug-2019 16:54:45  Salomon Parks

## 2019-08-28 NOTE — BRIEF OPERATIVE NOTE - OPERATION/FINDINGS
as per PATH report Flexible bronchoscopy; L uniportal VATS SERA wedge resection w/completion SERA lobectomy and MLND

## 2019-08-29 LAB
ANION GAP SERPL CALC-SCNC: 15 MMO/L — HIGH (ref 7–14)
BUN SERPL-MCNC: 17 MG/DL — SIGNIFICANT CHANGE UP (ref 7–23)
CALCIUM SERPL-MCNC: 9.3 MG/DL — SIGNIFICANT CHANGE UP (ref 8.4–10.5)
CHLORIDE SERPL-SCNC: 103 MMOL/L — SIGNIFICANT CHANGE UP (ref 98–107)
CO2 SERPL-SCNC: 20 MMOL/L — LOW (ref 22–31)
CREAT SERPL-MCNC: 0.65 MG/DL — SIGNIFICANT CHANGE UP (ref 0.5–1.3)
GLUCOSE SERPL-MCNC: 193 MG/DL — HIGH (ref 70–99)
HCT VFR BLD CALC: 30.3 % — LOW (ref 34.5–45)
HGB BLD-MCNC: 10.1 G/DL — LOW (ref 11.5–15.5)
MAGNESIUM SERPL-MCNC: 2.1 MG/DL — SIGNIFICANT CHANGE UP (ref 1.6–2.6)
MCHC RBC-ENTMCNC: 33.3 % — SIGNIFICANT CHANGE UP (ref 32–36)
MCHC RBC-ENTMCNC: 38.1 PG — HIGH (ref 27–34)
MCV RBC AUTO: 114.3 FL — HIGH (ref 80–100)
NRBC # FLD: 0.02 K/UL — SIGNIFICANT CHANGE UP (ref 0–0)
PHOSPHATE SERPL-MCNC: 3.8 MG/DL — SIGNIFICANT CHANGE UP (ref 2.5–4.5)
PLATELET # BLD AUTO: 391 K/UL — SIGNIFICANT CHANGE UP (ref 150–400)
PMV BLD: 9.2 FL — SIGNIFICANT CHANGE UP (ref 7–13)
POTASSIUM SERPL-MCNC: 4.1 MMOL/L — SIGNIFICANT CHANGE UP (ref 3.5–5.3)
POTASSIUM SERPL-SCNC: 4.1 MMOL/L — SIGNIFICANT CHANGE UP (ref 3.5–5.3)
RBC # BLD: 2.65 M/UL — LOW (ref 3.8–5.2)
RBC # FLD: 12.7 % — SIGNIFICANT CHANGE UP (ref 10.3–14.5)
SODIUM SERPL-SCNC: 138 MMOL/L — SIGNIFICANT CHANGE UP (ref 135–145)
WBC # BLD: 21.04 K/UL — HIGH (ref 3.8–10.5)
WBC # FLD AUTO: 21.04 K/UL — HIGH (ref 3.8–10.5)

## 2019-08-29 PROCEDURE — 99233 SBSQ HOSP IP/OBS HIGH 50: CPT

## 2019-08-29 PROCEDURE — 71045 X-RAY EXAM CHEST 1 VIEW: CPT | Mod: 26

## 2019-08-29 RX ORDER — HEPARIN SODIUM 5000 [USP'U]/ML
5000 INJECTION INTRAVENOUS; SUBCUTANEOUS EVERY 8 HOURS
Refills: 0 | Status: DISCONTINUED | OUTPATIENT
Start: 2019-08-29 | End: 2019-09-04

## 2019-08-29 RX ORDER — ACETAMINOPHEN 500 MG
1000 TABLET ORAL ONCE
Refills: 0 | Status: COMPLETED | OUTPATIENT
Start: 2019-08-29 | End: 2019-08-29

## 2019-08-29 RX ORDER — ACETAMINOPHEN 500 MG
650 TABLET ORAL EVERY 6 HOURS
Refills: 0 | Status: COMPLETED | OUTPATIENT
Start: 2019-08-29 | End: 2019-08-31

## 2019-08-29 RX ADMIN — SODIUM CHLORIDE 30 MILLILITER(S): 9 INJECTION, SOLUTION INTRAVENOUS at 07:12

## 2019-08-29 RX ADMIN — HYDROMORPHONE HYDROCHLORIDE 30 MILLILITER(S): 2 INJECTION INTRAMUSCULAR; INTRAVENOUS; SUBCUTANEOUS at 07:12

## 2019-08-29 RX ADMIN — ESCITALOPRAM OXALATE 20 MILLIGRAM(S): 10 TABLET, FILM COATED ORAL at 11:41

## 2019-08-29 RX ADMIN — Medication 650 MILLIGRAM(S): at 12:15

## 2019-08-29 RX ADMIN — Medication 650 MILLIGRAM(S): at 22:50

## 2019-08-29 RX ADMIN — Medication 400 MILLIGRAM(S): at 05:30

## 2019-08-29 RX ADMIN — FAMOTIDINE 20 MILLIGRAM(S): 10 INJECTION INTRAVENOUS at 17:47

## 2019-08-29 RX ADMIN — HEPARIN SODIUM 5000 UNIT(S): 5000 INJECTION INTRAVENOUS; SUBCUTANEOUS at 14:42

## 2019-08-29 RX ADMIN — Medication 100 MILLIGRAM(S): at 22:50

## 2019-08-29 RX ADMIN — Medication 650 MILLIGRAM(S): at 11:45

## 2019-08-29 RX ADMIN — Medication 100 MILLIGRAM(S): at 14:41

## 2019-08-29 RX ADMIN — Medication 100 MILLIGRAM(S): at 05:44

## 2019-08-29 RX ADMIN — SENNA PLUS 2 TABLET(S): 8.6 TABLET ORAL at 22:51

## 2019-08-29 RX ADMIN — GABAPENTIN 300 MILLIGRAM(S): 400 CAPSULE ORAL at 11:41

## 2019-08-29 RX ADMIN — Medication 1000 MILLIGRAM(S): at 06:00

## 2019-08-29 RX ADMIN — Medication 3 MILLILITER(S): at 09:16

## 2019-08-29 RX ADMIN — Medication 3 MILLILITER(S): at 15:18

## 2019-08-29 RX ADMIN — HEPARIN SODIUM 5000 UNIT(S): 5000 INJECTION INTRAVENOUS; SUBCUTANEOUS at 22:51

## 2019-08-29 RX ADMIN — FAMOTIDINE 20 MILLIGRAM(S): 10 INJECTION INTRAVENOUS at 05:44

## 2019-08-29 NOTE — PHYSICAL THERAPY INITIAL EVALUATION ADULT - GENERAL OBSERVATIONS, REHAB EVAL
Patient received seated out of bed in a recliner chair, (+) Chest tube, (+) IV, (+) tele monitor ,(+) O2 , RN Nanette is with the patient.

## 2019-08-29 NOTE — PHYSICAL THERAPY INITIAL EVALUATION ADULT - ACTIVE RANGE OF MOTION EXAMINATION, REHAB EVAL
bilateral upper extremity Active ROM was WFL (within functional limits)/bilateral  lower extremity Active ROM was WFL (within functional limits)/except left shoulder flexion 0-80 degrees s/p Sx

## 2019-08-29 NOTE — PHYSICAL THERAPY INITIAL EVALUATION ADULT - PERTINENT HX OF CURRENT PROBLEM, REHAB EVAL
This is a 79 year old female with diagnosis of solitary pulmonary nodule is now s/p flexible bronchoscopy, left video assisted thoracoscopy, lung resection for 8/28/19.

## 2019-08-30 ENCOUNTER — TRANSCRIPTION ENCOUNTER (OUTPATIENT)
Age: 79
End: 2019-08-30

## 2019-08-30 LAB
ANION GAP SERPL CALC-SCNC: 11 MMO/L — SIGNIFICANT CHANGE UP (ref 7–14)
BUN SERPL-MCNC: 12 MG/DL — SIGNIFICANT CHANGE UP (ref 7–23)
CALCIUM SERPL-MCNC: 9.1 MG/DL — SIGNIFICANT CHANGE UP (ref 8.4–10.5)
CHLORIDE SERPL-SCNC: 104 MMOL/L — SIGNIFICANT CHANGE UP (ref 98–107)
CO2 SERPL-SCNC: 23 MMOL/L — SIGNIFICANT CHANGE UP (ref 22–31)
CREAT SERPL-MCNC: 0.69 MG/DL — SIGNIFICANT CHANGE UP (ref 0.5–1.3)
GLUCOSE SERPL-MCNC: 130 MG/DL — HIGH (ref 70–99)
HCT VFR BLD CALC: 27.6 % — LOW (ref 34.5–45)
HGB BLD-MCNC: 9.2 G/DL — LOW (ref 11.5–15.5)
MAGNESIUM SERPL-MCNC: 2.2 MG/DL — SIGNIFICANT CHANGE UP (ref 1.6–2.6)
MCHC RBC-ENTMCNC: 33.3 % — SIGNIFICANT CHANGE UP (ref 32–36)
MCHC RBC-ENTMCNC: 38.3 PG — HIGH (ref 27–34)
MCV RBC AUTO: 115 FL — HIGH (ref 80–100)
NRBC # FLD: 0 K/UL — SIGNIFICANT CHANGE UP (ref 0–0)
PHOSPHATE SERPL-MCNC: 2.2 MG/DL — LOW (ref 2.5–4.5)
PLATELET # BLD AUTO: 377 K/UL — SIGNIFICANT CHANGE UP (ref 150–400)
PMV BLD: 9.4 FL — SIGNIFICANT CHANGE UP (ref 7–13)
POTASSIUM SERPL-MCNC: 4.5 MMOL/L — SIGNIFICANT CHANGE UP (ref 3.5–5.3)
POTASSIUM SERPL-SCNC: 4.5 MMOL/L — SIGNIFICANT CHANGE UP (ref 3.5–5.3)
RBC # BLD: 2.4 M/UL — LOW (ref 3.8–5.2)
RBC # FLD: 13.1 % — SIGNIFICANT CHANGE UP (ref 10.3–14.5)
SODIUM SERPL-SCNC: 138 MMOL/L — SIGNIFICANT CHANGE UP (ref 135–145)
WBC # BLD: 19.04 K/UL — HIGH (ref 3.8–10.5)
WBC # FLD AUTO: 19.04 K/UL — HIGH (ref 3.8–10.5)

## 2019-08-30 PROCEDURE — 99233 SBSQ HOSP IP/OBS HIGH 50: CPT

## 2019-08-30 PROCEDURE — 71045 X-RAY EXAM CHEST 1 VIEW: CPT | Mod: 26

## 2019-08-30 RX ADMIN — Medication 650 MILLIGRAM(S): at 21:35

## 2019-08-30 RX ADMIN — HYDROMORPHONE HYDROCHLORIDE 30 MILLILITER(S): 2 INJECTION INTRAMUSCULAR; INTRAVENOUS; SUBCUTANEOUS at 19:17

## 2019-08-30 RX ADMIN — Medication 650 MILLIGRAM(S): at 09:51

## 2019-08-30 RX ADMIN — Medication 650 MILLIGRAM(S): at 21:05

## 2019-08-30 RX ADMIN — Medication 650 MILLIGRAM(S): at 04:53

## 2019-08-30 RX ADMIN — SODIUM CHLORIDE 30 MILLILITER(S): 9 INJECTION, SOLUTION INTRAVENOUS at 04:53

## 2019-08-30 RX ADMIN — Medication 63.75 MILLIMOLE(S): at 08:05

## 2019-08-30 RX ADMIN — Medication 100 MILLIGRAM(S): at 13:37

## 2019-08-30 RX ADMIN — FAMOTIDINE 20 MILLIGRAM(S): 10 INJECTION INTRAVENOUS at 05:00

## 2019-08-30 RX ADMIN — Medication 100 MILLIGRAM(S): at 21:05

## 2019-08-30 RX ADMIN — HYDROMORPHONE HYDROCHLORIDE 30 MILLILITER(S): 2 INJECTION INTRAMUSCULAR; INTRAVENOUS; SUBCUTANEOUS at 07:03

## 2019-08-30 RX ADMIN — HEPARIN SODIUM 5000 UNIT(S): 5000 INJECTION INTRAVENOUS; SUBCUTANEOUS at 05:00

## 2019-08-30 RX ADMIN — Medication 100 MILLIGRAM(S): at 05:00

## 2019-08-30 RX ADMIN — SODIUM CHLORIDE 30 MILLILITER(S): 9 INJECTION, SOLUTION INTRAVENOUS at 19:18

## 2019-08-30 RX ADMIN — Medication 3 MILLILITER(S): at 16:53

## 2019-08-30 RX ADMIN — Medication 3 MILLILITER(S): at 22:34

## 2019-08-30 RX ADMIN — SENNA PLUS 2 TABLET(S): 8.6 TABLET ORAL at 21:05

## 2019-08-30 RX ADMIN — SODIUM CHLORIDE 30 MILLILITER(S): 9 INJECTION, SOLUTION INTRAVENOUS at 07:03

## 2019-08-30 RX ADMIN — FAMOTIDINE 20 MILLIGRAM(S): 10 INJECTION INTRAVENOUS at 18:11

## 2019-08-30 RX ADMIN — Medication 3 MILLILITER(S): at 10:03

## 2019-08-30 RX ADMIN — ESCITALOPRAM OXALATE 20 MILLIGRAM(S): 10 TABLET, FILM COATED ORAL at 13:38

## 2019-08-30 RX ADMIN — HEPARIN SODIUM 5000 UNIT(S): 5000 INJECTION INTRAVENOUS; SUBCUTANEOUS at 21:05

## 2019-08-30 RX ADMIN — HEPARIN SODIUM 5000 UNIT(S): 5000 INJECTION INTRAVENOUS; SUBCUTANEOUS at 13:37

## 2019-08-30 RX ADMIN — GABAPENTIN 300 MILLIGRAM(S): 400 CAPSULE ORAL at 13:38

## 2019-08-31 LAB
ANION GAP SERPL CALC-SCNC: 16 MMO/L — HIGH (ref 7–14)
BASOPHILS # BLD AUTO: 0.03 K/UL — SIGNIFICANT CHANGE UP (ref 0–0.2)
BASOPHILS NFR BLD AUTO: 0.1 % — SIGNIFICANT CHANGE UP (ref 0–2)
BUN SERPL-MCNC: 8 MG/DL — SIGNIFICANT CHANGE UP (ref 7–23)
CALCIUM SERPL-MCNC: 8.6 MG/DL — SIGNIFICANT CHANGE UP (ref 8.4–10.5)
CHLORIDE SERPL-SCNC: 98 MMOL/L — SIGNIFICANT CHANGE UP (ref 98–107)
CO2 SERPL-SCNC: 21 MMOL/L — LOW (ref 22–31)
CREAT SERPL-MCNC: 0.46 MG/DL — LOW (ref 0.5–1.3)
EOSINOPHIL # BLD AUTO: 0.09 K/UL — SIGNIFICANT CHANGE UP (ref 0–0.5)
EOSINOPHIL NFR BLD AUTO: 0.4 % — SIGNIFICANT CHANGE UP (ref 0–6)
GLUCOSE BLDC GLUCOMTR-MCNC: 132 MG/DL — HIGH (ref 70–99)
GLUCOSE BLDC GLUCOMTR-MCNC: 137 MG/DL — HIGH (ref 70–99)
GLUCOSE SERPL-MCNC: 127 MG/DL — HIGH (ref 70–99)
GRAM STN SPT: SIGNIFICANT CHANGE UP
HCT VFR BLD CALC: 26.9 % — LOW (ref 34.5–45)
HCT VFR BLD CALC: 26.9 % — LOW (ref 34.5–45)
HGB BLD-MCNC: 8.9 G/DL — LOW (ref 11.5–15.5)
HGB BLD-MCNC: 8.9 G/DL — LOW (ref 11.5–15.5)
IMM GRANULOCYTES NFR BLD AUTO: 1 % — SIGNIFICANT CHANGE UP (ref 0–1.5)
LYMPHOCYTES # BLD AUTO: 2.03 K/UL — SIGNIFICANT CHANGE UP (ref 1–3.3)
LYMPHOCYTES # BLD AUTO: 9.2 % — LOW (ref 13–44)
MAGNESIUM SERPL-MCNC: 1.7 MG/DL — SIGNIFICANT CHANGE UP (ref 1.6–2.6)
MCHC RBC-ENTMCNC: 33.1 % — SIGNIFICANT CHANGE UP (ref 32–36)
MCHC RBC-ENTMCNC: 33.1 % — SIGNIFICANT CHANGE UP (ref 32–36)
MCHC RBC-ENTMCNC: 38.4 PG — HIGH (ref 27–34)
MCHC RBC-ENTMCNC: 38.4 PG — HIGH (ref 27–34)
MCV RBC AUTO: 115.9 FL — HIGH (ref 80–100)
MCV RBC AUTO: 115.9 FL — HIGH (ref 80–100)
MONOCYTES # BLD AUTO: 2.37 K/UL — HIGH (ref 0–0.9)
MONOCYTES NFR BLD AUTO: 10.7 % — SIGNIFICANT CHANGE UP (ref 2–14)
NEUTROPHILS # BLD AUTO: 17.37 K/UL — HIGH (ref 1.8–7.4)
NEUTROPHILS NFR BLD AUTO: 78.6 % — HIGH (ref 43–77)
NRBC # FLD: 0 K/UL — SIGNIFICANT CHANGE UP (ref 0–0)
NRBC # FLD: 0 K/UL — SIGNIFICANT CHANGE UP (ref 0–0)
PHOSPHATE SERPL-MCNC: 2.7 MG/DL — SIGNIFICANT CHANGE UP (ref 2.5–4.5)
PLATELET # BLD AUTO: 313 K/UL — SIGNIFICANT CHANGE UP (ref 150–400)
PLATELET # BLD AUTO: 313 K/UL — SIGNIFICANT CHANGE UP (ref 150–400)
PMV BLD: 9.4 FL — SIGNIFICANT CHANGE UP (ref 7–13)
PMV BLD: 9.4 FL — SIGNIFICANT CHANGE UP (ref 7–13)
POTASSIUM SERPL-MCNC: 3.9 MMOL/L — SIGNIFICANT CHANGE UP (ref 3.5–5.3)
POTASSIUM SERPL-SCNC: 3.9 MMOL/L — SIGNIFICANT CHANGE UP (ref 3.5–5.3)
RBC # BLD: 2.32 M/UL — LOW (ref 3.8–5.2)
RBC # BLD: 2.32 M/UL — LOW (ref 3.8–5.2)
RBC # FLD: 13.2 % — SIGNIFICANT CHANGE UP (ref 10.3–14.5)
RBC # FLD: 13.2 % — SIGNIFICANT CHANGE UP (ref 10.3–14.5)
SODIUM SERPL-SCNC: 135 MMOL/L — SIGNIFICANT CHANGE UP (ref 135–145)
SPECIMEN SOURCE: SIGNIFICANT CHANGE UP
WBC # BLD: 21.59 K/UL — HIGH (ref 3.8–10.5)
WBC # BLD: 21.59 K/UL — HIGH (ref 3.8–10.5)
WBC # FLD AUTO: 21.59 K/UL — HIGH (ref 3.8–10.5)
WBC # FLD AUTO: 21.59 K/UL — HIGH (ref 3.8–10.5)

## 2019-08-31 PROCEDURE — 31624 DX BRONCHOSCOPE/LAVAGE: CPT | Mod: 78

## 2019-08-31 PROCEDURE — 71045 X-RAY EXAM CHEST 1 VIEW: CPT | Mod: 26

## 2019-08-31 PROCEDURE — 99233 SBSQ HOSP IP/OBS HIGH 50: CPT

## 2019-08-31 RX ORDER — LIDOCAINE HCL 20 MG/ML
4 VIAL (ML) INJECTION ONCE
Refills: 0 | Status: COMPLETED | OUTPATIENT
Start: 2019-08-31 | End: 2019-08-31

## 2019-08-31 RX ORDER — ACETAMINOPHEN 500 MG
1000 TABLET ORAL ONCE
Refills: 0 | Status: COMPLETED | OUTPATIENT
Start: 2019-08-31 | End: 2019-08-31

## 2019-08-31 RX ORDER — VANCOMYCIN HCL 1 G
1000 VIAL (EA) INTRAVENOUS EVERY 12 HOURS
Refills: 0 | Status: DISCONTINUED | OUTPATIENT
Start: 2019-08-31 | End: 2019-09-02

## 2019-08-31 RX ORDER — PIPERACILLIN AND TAZOBACTAM 4; .5 G/20ML; G/20ML
3.38 INJECTION, POWDER, LYOPHILIZED, FOR SOLUTION INTRAVENOUS ONCE
Refills: 0 | Status: COMPLETED | OUTPATIENT
Start: 2019-08-31 | End: 2019-08-31

## 2019-08-31 RX ORDER — MIDAZOLAM HYDROCHLORIDE 1 MG/ML
2 INJECTION, SOLUTION INTRAMUSCULAR; INTRAVENOUS ONCE
Refills: 0 | Status: DISCONTINUED | OUTPATIENT
Start: 2019-08-31 | End: 2019-08-31

## 2019-08-31 RX ORDER — SODIUM CHLORIDE 9 MG/ML
250 INJECTION, SOLUTION INTRAVENOUS
Refills: 0 | Status: COMPLETED | OUTPATIENT
Start: 2019-08-31 | End: 2019-08-31

## 2019-08-31 RX ORDER — SENNA PLUS 8.6 MG/1
2 TABLET ORAL AT BEDTIME
Refills: 0 | Status: DISCONTINUED | OUTPATIENT
Start: 2019-08-31 | End: 2019-09-01

## 2019-08-31 RX ORDER — SODIUM CHLORIDE 9 MG/ML
4 INJECTION INTRAMUSCULAR; INTRAVENOUS; SUBCUTANEOUS EVERY 6 HOURS
Refills: 0 | Status: COMPLETED | OUTPATIENT
Start: 2019-08-31 | End: 2019-09-03

## 2019-08-31 RX ORDER — PIPERACILLIN AND TAZOBACTAM 4; .5 G/20ML; G/20ML
3.38 INJECTION, POWDER, LYOPHILIZED, FOR SOLUTION INTRAVENOUS EVERY 8 HOURS
Refills: 0 | Status: DISCONTINUED | OUTPATIENT
Start: 2019-08-31 | End: 2019-09-03

## 2019-08-31 RX ORDER — DORNASE ALFA 1 MG/ML
2.5 SOLUTION RESPIRATORY (INHALATION) DAILY
Refills: 0 | Status: COMPLETED | OUTPATIENT
Start: 2019-08-31 | End: 2019-09-03

## 2019-08-31 RX ORDER — LEVALBUTEROL 1.25 MG/.5ML
0.63 SOLUTION, CONCENTRATE RESPIRATORY (INHALATION) EVERY 6 HOURS
Refills: 0 | Status: DISCONTINUED | OUTPATIENT
Start: 2019-08-31 | End: 2019-09-04

## 2019-08-31 RX ORDER — ACETAMINOPHEN 500 MG
1000 TABLET ORAL ONCE
Refills: 0 | Status: COMPLETED | OUTPATIENT
Start: 2019-09-01 | End: 2019-09-01

## 2019-08-31 RX ADMIN — Medication 650 MILLIGRAM(S): at 04:15

## 2019-08-31 RX ADMIN — PIPERACILLIN AND TAZOBACTAM 25 GRAM(S): 4; .5 INJECTION, POWDER, LYOPHILIZED, FOR SOLUTION INTRAVENOUS at 16:50

## 2019-08-31 RX ADMIN — Medication 4 MILLILITER(S): at 10:00

## 2019-08-31 RX ADMIN — Medication 250 MILLIGRAM(S): at 21:50

## 2019-08-31 RX ADMIN — SODIUM CHLORIDE 250 MILLILITER(S): 9 INJECTION, SOLUTION INTRAVENOUS at 19:19

## 2019-08-31 RX ADMIN — Medication 250 MILLIGRAM(S): at 10:00

## 2019-08-31 RX ADMIN — SODIUM CHLORIDE 30 MILLILITER(S): 9 INJECTION, SOLUTION INTRAVENOUS at 07:15

## 2019-08-31 RX ADMIN — HEPARIN SODIUM 5000 UNIT(S): 5000 INJECTION INTRAVENOUS; SUBCUTANEOUS at 21:50

## 2019-08-31 RX ADMIN — HYDROMORPHONE HYDROCHLORIDE 30 MILLILITER(S): 2 INJECTION INTRAMUSCULAR; INTRAVENOUS; SUBCUTANEOUS at 07:15

## 2019-08-31 RX ADMIN — Medication 400 MILLIGRAM(S): at 20:00

## 2019-08-31 RX ADMIN — Medication 3 MILLILITER(S): at 04:20

## 2019-08-31 RX ADMIN — PIPERACILLIN AND TAZOBACTAM 200 GRAM(S): 4; .5 INJECTION, POWDER, LYOPHILIZED, FOR SOLUTION INTRAVENOUS at 08:44

## 2019-08-31 RX ADMIN — FAMOTIDINE 20 MILLIGRAM(S): 10 INJECTION INTRAVENOUS at 05:11

## 2019-08-31 RX ADMIN — SODIUM CHLORIDE 4 MILLILITER(S): 9 INJECTION INTRAMUSCULAR; INTRAVENOUS; SUBCUTANEOUS at 22:55

## 2019-08-31 RX ADMIN — FAMOTIDINE 20 MILLIGRAM(S): 10 INJECTION INTRAVENOUS at 21:51

## 2019-08-31 RX ADMIN — Medication 1000 MILLIGRAM(S): at 13:08

## 2019-08-31 RX ADMIN — MIDAZOLAM HYDROCHLORIDE 2 MILLIGRAM(S): 1 INJECTION, SOLUTION INTRAMUSCULAR; INTRAVENOUS at 11:00

## 2019-08-31 RX ADMIN — SODIUM CHLORIDE 4 MILLILITER(S): 9 INJECTION INTRAMUSCULAR; INTRAVENOUS; SUBCUTANEOUS at 15:43

## 2019-08-31 RX ADMIN — Medication 1000 MILLIGRAM(S): at 21:00

## 2019-08-31 RX ADMIN — SODIUM CHLORIDE 4 MILLILITER(S): 9 INJECTION INTRAMUSCULAR; INTRAVENOUS; SUBCUTANEOUS at 10:01

## 2019-08-31 RX ADMIN — PIPERACILLIN AND TAZOBACTAM 25 GRAM(S): 4; .5 INJECTION, POWDER, LYOPHILIZED, FOR SOLUTION INTRAVENOUS at 22:51

## 2019-08-31 RX ADMIN — LEVALBUTEROL 0.63 MILLIGRAM(S): 1.25 SOLUTION, CONCENTRATE RESPIRATORY (INHALATION) at 10:00

## 2019-08-31 RX ADMIN — Medication 400 MILLIGRAM(S): at 13:07

## 2019-08-31 RX ADMIN — HYDROMORPHONE HYDROCHLORIDE 30 MILLILITER(S): 2 INJECTION INTRAMUSCULAR; INTRAVENOUS; SUBCUTANEOUS at 19:19

## 2019-08-31 RX ADMIN — ESCITALOPRAM OXALATE 20 MILLIGRAM(S): 10 TABLET, FILM COATED ORAL at 21:50

## 2019-08-31 RX ADMIN — Medication 100 MILLIGRAM(S): at 05:11

## 2019-08-31 RX ADMIN — HEPARIN SODIUM 5000 UNIT(S): 5000 INJECTION INTRAVENOUS; SUBCUTANEOUS at 13:31

## 2019-08-31 RX ADMIN — HEPARIN SODIUM 5000 UNIT(S): 5000 INJECTION INTRAVENOUS; SUBCUTANEOUS at 05:11

## 2019-08-31 RX ADMIN — Medication 650 MILLIGRAM(S): at 04:45

## 2019-08-31 RX ADMIN — SODIUM CHLORIDE 30 MILLILITER(S): 9 INJECTION, SOLUTION INTRAVENOUS at 19:19

## 2019-08-31 RX ADMIN — LEVALBUTEROL 0.63 MILLIGRAM(S): 1.25 SOLUTION, CONCENTRATE RESPIRATORY (INHALATION) at 15:42

## 2019-08-31 RX ADMIN — DORNASE ALFA 2.5 MILLIGRAM(S): 1 SOLUTION RESPIRATORY (INHALATION) at 10:00

## 2019-09-01 ENCOUNTER — TRANSCRIPTION ENCOUNTER (OUTPATIENT)
Age: 79
End: 2019-09-01

## 2019-09-01 LAB
ANION GAP SERPL CALC-SCNC: 11 MMO/L — SIGNIFICANT CHANGE UP (ref 7–14)
ANION GAP SERPL CALC-SCNC: 9 MMO/L — SIGNIFICANT CHANGE UP (ref 7–14)
BASOPHILS # BLD AUTO: 0.05 K/UL — SIGNIFICANT CHANGE UP (ref 0–0.2)
BASOPHILS NFR BLD AUTO: 0.2 % — SIGNIFICANT CHANGE UP (ref 0–2)
BUN SERPL-MCNC: 16 MG/DL — SIGNIFICANT CHANGE UP (ref 7–23)
BUN SERPL-MCNC: 6 MG/DL — LOW (ref 7–23)
CALCIUM SERPL-MCNC: 9.2 MG/DL — SIGNIFICANT CHANGE UP (ref 8.4–10.5)
CALCIUM SERPL-MCNC: 9.2 MG/DL — SIGNIFICANT CHANGE UP (ref 8.4–10.5)
CHLORIDE SERPL-SCNC: 103 MMOL/L — SIGNIFICANT CHANGE UP (ref 98–107)
CHLORIDE SERPL-SCNC: 103 MMOL/L — SIGNIFICANT CHANGE UP (ref 98–107)
CO2 SERPL-SCNC: 25 MMOL/L — SIGNIFICANT CHANGE UP (ref 22–31)
CO2 SERPL-SCNC: 27 MMOL/L — SIGNIFICANT CHANGE UP (ref 22–31)
CREAT SERPL-MCNC: 0.55 MG/DL — SIGNIFICANT CHANGE UP (ref 0.5–1.3)
CREAT SERPL-MCNC: 1.4 MG/DL — HIGH (ref 0.5–1.3)
EOSINOPHIL # BLD AUTO: 0.05 K/UL — SIGNIFICANT CHANGE UP (ref 0–0.5)
EOSINOPHIL NFR BLD AUTO: 0.2 % — SIGNIFICANT CHANGE UP (ref 0–6)
GLUCOSE SERPL-MCNC: 122 MG/DL — HIGH (ref 70–99)
GLUCOSE SERPL-MCNC: 125 MG/DL — HIGH (ref 70–99)
HCT VFR BLD CALC: 29.2 % — LOW (ref 34.5–45)
HGB BLD-MCNC: 10 G/DL — LOW (ref 11.5–15.5)
IMM GRANULOCYTES NFR BLD AUTO: 1.2 % — SIGNIFICANT CHANGE UP (ref 0–1.5)
LYMPHOCYTES # BLD AUTO: 2.26 K/UL — SIGNIFICANT CHANGE UP (ref 1–3.3)
LYMPHOCYTES # BLD AUTO: 9.9 % — LOW (ref 13–44)
MAGNESIUM SERPL-MCNC: 2 MG/DL — SIGNIFICANT CHANGE UP (ref 1.6–2.6)
MAGNESIUM SERPL-MCNC: 2.1 MG/DL — SIGNIFICANT CHANGE UP (ref 1.6–2.6)
MCHC RBC-ENTMCNC: 34.2 % — SIGNIFICANT CHANGE UP (ref 32–36)
MCHC RBC-ENTMCNC: 38.8 PG — HIGH (ref 27–34)
MCV RBC AUTO: 113.2 FL — HIGH (ref 80–100)
MONOCYTES # BLD AUTO: 2.5 K/UL — HIGH (ref 0–0.9)
MONOCYTES NFR BLD AUTO: 11 % — SIGNIFICANT CHANGE UP (ref 2–14)
NEUTROPHILS # BLD AUTO: 17.65 K/UL — HIGH (ref 1.8–7.4)
NEUTROPHILS NFR BLD AUTO: 77.5 % — HIGH (ref 43–77)
NRBC # FLD: 0 K/UL — SIGNIFICANT CHANGE UP (ref 0–0)
PHOSPHATE SERPL-MCNC: 2.7 MG/DL — SIGNIFICANT CHANGE UP (ref 2.5–4.5)
PLATELET # BLD AUTO: 349 K/UL — SIGNIFICANT CHANGE UP (ref 150–400)
PMV BLD: 9.5 FL — SIGNIFICANT CHANGE UP (ref 7–13)
POTASSIUM SERPL-MCNC: 2.8 MMOL/L — CRITICAL LOW (ref 3.5–5.3)
POTASSIUM SERPL-MCNC: 3.4 MMOL/L — LOW (ref 3.5–5.3)
POTASSIUM SERPL-SCNC: 2.8 MMOL/L — CRITICAL LOW (ref 3.5–5.3)
POTASSIUM SERPL-SCNC: 3.4 MMOL/L — LOW (ref 3.5–5.3)
RBC # BLD: 2.58 M/UL — LOW (ref 3.8–5.2)
RBC # FLD: 13.4 % — SIGNIFICANT CHANGE UP (ref 10.3–14.5)
SODIUM SERPL-SCNC: 137 MMOL/L — SIGNIFICANT CHANGE UP (ref 135–145)
SODIUM SERPL-SCNC: 141 MMOL/L — SIGNIFICANT CHANGE UP (ref 135–145)
SPECIMEN SOURCE: SIGNIFICANT CHANGE UP
VANCOMYCIN TROUGH SERPL-MCNC: 16.7 UG/ML — SIGNIFICANT CHANGE UP (ref 10–20)
WBC # BLD: 22.79 K/UL — HIGH (ref 3.8–10.5)
WBC # FLD AUTO: 22.79 K/UL — HIGH (ref 3.8–10.5)

## 2019-09-01 PROCEDURE — 71045 X-RAY EXAM CHEST 1 VIEW: CPT | Mod: 26

## 2019-09-01 PROCEDURE — 99233 SBSQ HOSP IP/OBS HIGH 50: CPT

## 2019-09-01 RX ORDER — POTASSIUM CHLORIDE 20 MEQ
10 PACKET (EA) ORAL ONCE
Refills: 0 | Status: COMPLETED | OUTPATIENT
Start: 2019-09-01 | End: 2019-09-01

## 2019-09-01 RX ORDER — POTASSIUM CHLORIDE 20 MEQ
10 PACKET (EA) ORAL
Refills: 0 | Status: COMPLETED | OUTPATIENT
Start: 2019-09-01 | End: 2019-09-01

## 2019-09-01 RX ORDER — POTASSIUM CHLORIDE 20 MEQ
10 PACKET (EA) ORAL
Refills: 0 | Status: DISCONTINUED | OUTPATIENT
Start: 2019-09-01 | End: 2019-09-01

## 2019-09-01 RX ORDER — SODIUM CHLORIDE 9 MG/ML
500 INJECTION, SOLUTION INTRAVENOUS ONCE
Refills: 0 | Status: COMPLETED | OUTPATIENT
Start: 2019-09-01 | End: 2019-09-01

## 2019-09-01 RX ORDER — OXYCODONE HYDROCHLORIDE 5 MG/1
10 TABLET ORAL EVERY 4 HOURS
Refills: 0 | Status: DISCONTINUED | OUTPATIENT
Start: 2019-09-01 | End: 2019-09-04

## 2019-09-01 RX ORDER — POTASSIUM CHLORIDE 20 MEQ
40 PACKET (EA) ORAL ONCE
Refills: 0 | Status: COMPLETED | OUTPATIENT
Start: 2019-09-01 | End: 2019-09-01

## 2019-09-01 RX ORDER — OXYCODONE HYDROCHLORIDE 5 MG/1
5 TABLET ORAL EVERY 4 HOURS
Refills: 0 | Status: DISCONTINUED | OUTPATIENT
Start: 2019-09-01 | End: 2019-09-04

## 2019-09-01 RX ORDER — CHLORHEXIDINE GLUCONATE 213 G/1000ML
1 SOLUTION TOPICAL EVERY 24 HOURS
Refills: 0 | Status: DISCONTINUED | OUTPATIENT
Start: 2019-09-01 | End: 2019-09-04

## 2019-09-01 RX ADMIN — HEPARIN SODIUM 5000 UNIT(S): 5000 INJECTION INTRAVENOUS; SUBCUTANEOUS at 21:42

## 2019-09-01 RX ADMIN — Medication 250 MILLIGRAM(S): at 22:51

## 2019-09-01 RX ADMIN — HEPARIN SODIUM 5000 UNIT(S): 5000 INJECTION INTRAVENOUS; SUBCUTANEOUS at 14:38

## 2019-09-01 RX ADMIN — HEPARIN SODIUM 5000 UNIT(S): 5000 INJECTION INTRAVENOUS; SUBCUTANEOUS at 05:18

## 2019-09-01 RX ADMIN — SODIUM CHLORIDE 4 MILLILITER(S): 9 INJECTION INTRAMUSCULAR; INTRAVENOUS; SUBCUTANEOUS at 15:36

## 2019-09-01 RX ADMIN — HYDROMORPHONE HYDROCHLORIDE 30 MILLILITER(S): 2 INJECTION INTRAMUSCULAR; INTRAVENOUS; SUBCUTANEOUS at 07:23

## 2019-09-01 RX ADMIN — Medication 100 MILLIEQUIVALENT(S): at 09:30

## 2019-09-01 RX ADMIN — SODIUM CHLORIDE 4 MILLILITER(S): 9 INJECTION INTRAMUSCULAR; INTRAVENOUS; SUBCUTANEOUS at 09:32

## 2019-09-01 RX ADMIN — DORNASE ALFA 2.5 MILLIGRAM(S): 1 SOLUTION RESPIRATORY (INHALATION) at 09:32

## 2019-09-01 RX ADMIN — SODIUM CHLORIDE 30 MILLILITER(S): 9 INJECTION, SOLUTION INTRAVENOUS at 19:36

## 2019-09-01 RX ADMIN — Medication 40 MILLIEQUIVALENT(S): at 06:35

## 2019-09-01 RX ADMIN — LEVALBUTEROL 0.63 MILLIGRAM(S): 1.25 SOLUTION, CONCENTRATE RESPIRATORY (INHALATION) at 05:00

## 2019-09-01 RX ADMIN — Medication 400 MILLIGRAM(S): at 02:00

## 2019-09-01 RX ADMIN — ESCITALOPRAM OXALATE 20 MILLIGRAM(S): 10 TABLET, FILM COATED ORAL at 11:45

## 2019-09-01 RX ADMIN — SODIUM CHLORIDE 1000 MILLILITER(S): 9 INJECTION, SOLUTION INTRAVENOUS at 18:43

## 2019-09-01 RX ADMIN — LEVALBUTEROL 0.63 MILLIGRAM(S): 1.25 SOLUTION, CONCENTRATE RESPIRATORY (INHALATION) at 09:32

## 2019-09-01 RX ADMIN — LEVALBUTEROL 0.63 MILLIGRAM(S): 1.25 SOLUTION, CONCENTRATE RESPIRATORY (INHALATION) at 15:36

## 2019-09-01 RX ADMIN — GABAPENTIN 300 MILLIGRAM(S): 400 CAPSULE ORAL at 11:45

## 2019-09-01 RX ADMIN — FAMOTIDINE 20 MILLIGRAM(S): 10 INJECTION INTRAVENOUS at 06:36

## 2019-09-01 RX ADMIN — Medication 250 MILLIGRAM(S): at 11:44

## 2019-09-01 RX ADMIN — FAMOTIDINE 20 MILLIGRAM(S): 10 INJECTION INTRAVENOUS at 18:42

## 2019-09-01 RX ADMIN — Medication 40 MILLIEQUIVALENT(S): at 22:54

## 2019-09-01 RX ADMIN — Medication 100 MILLIEQUIVALENT(S): at 06:30

## 2019-09-01 RX ADMIN — SODIUM CHLORIDE 1000 MILLILITER(S): 9 INJECTION, SOLUTION INTRAVENOUS at 22:51

## 2019-09-01 RX ADMIN — PIPERACILLIN AND TAZOBACTAM 25 GRAM(S): 4; .5 INJECTION, POWDER, LYOPHILIZED, FOR SOLUTION INTRAVENOUS at 14:38

## 2019-09-01 RX ADMIN — PIPERACILLIN AND TAZOBACTAM 25 GRAM(S): 4; .5 INJECTION, POWDER, LYOPHILIZED, FOR SOLUTION INTRAVENOUS at 05:18

## 2019-09-01 RX ADMIN — LEVALBUTEROL 0.63 MILLIGRAM(S): 1.25 SOLUTION, CONCENTRATE RESPIRATORY (INHALATION) at 22:13

## 2019-09-01 RX ADMIN — SODIUM CHLORIDE 4 MILLILITER(S): 9 INJECTION INTRAMUSCULAR; INTRAVENOUS; SUBCUTANEOUS at 05:05

## 2019-09-01 RX ADMIN — PIPERACILLIN AND TAZOBACTAM 25 GRAM(S): 4; .5 INJECTION, POWDER, LYOPHILIZED, FOR SOLUTION INTRAVENOUS at 21:42

## 2019-09-01 RX ADMIN — Medication 100 MILLIEQUIVALENT(S): at 07:23

## 2019-09-01 RX ADMIN — Medication 1000 MILLIGRAM(S): at 02:30

## 2019-09-01 RX ADMIN — SODIUM CHLORIDE 4 MILLILITER(S): 9 INJECTION INTRAMUSCULAR; INTRAVENOUS; SUBCUTANEOUS at 22:16

## 2019-09-02 LAB
ANION GAP SERPL CALC-SCNC: 11 MMO/L — SIGNIFICANT CHANGE UP (ref 7–14)
APPEARANCE UR: CLEAR — SIGNIFICANT CHANGE UP
BACTERIA # UR AUTO: NEGATIVE — SIGNIFICANT CHANGE UP
BILIRUB UR-MCNC: NEGATIVE — SIGNIFICANT CHANGE UP
BLOOD UR QL VISUAL: NEGATIVE — SIGNIFICANT CHANGE UP
BUN SERPL-MCNC: 16 MG/DL — SIGNIFICANT CHANGE UP (ref 7–23)
CALCIUM SERPL-MCNC: 8.8 MG/DL — SIGNIFICANT CHANGE UP (ref 8.4–10.5)
CHLORIDE SERPL-SCNC: 104 MMOL/L — SIGNIFICANT CHANGE UP (ref 98–107)
CO2 SERPL-SCNC: 23 MMOL/L — SIGNIFICANT CHANGE UP (ref 22–31)
COLOR SPEC: COLORLESS — SIGNIFICANT CHANGE UP
CREAT ?TM UR-MCNC: 21.2 MG/DL — SIGNIFICANT CHANGE UP
CREAT SERPL-MCNC: 1.4 MG/DL — HIGH (ref 0.5–1.3)
GLUCOSE SERPL-MCNC: 113 MG/DL — HIGH (ref 70–99)
GLUCOSE UR-MCNC: NEGATIVE — SIGNIFICANT CHANGE UP
HCT VFR BLD CALC: 25.7 % — LOW (ref 34.5–45)
HGB BLD-MCNC: 8.6 G/DL — LOW (ref 11.5–15.5)
HYALINE CASTS # UR AUTO: NEGATIVE — SIGNIFICANT CHANGE UP
KETONES UR-MCNC: NEGATIVE — SIGNIFICANT CHANGE UP
LEUKOCYTE ESTERASE UR-ACNC: SIGNIFICANT CHANGE UP
MAGNESIUM SERPL-MCNC: 1.9 MG/DL — SIGNIFICANT CHANGE UP (ref 1.6–2.6)
MCHC RBC-ENTMCNC: 33.5 % — SIGNIFICANT CHANGE UP (ref 32–36)
MCHC RBC-ENTMCNC: 38.4 PG — HIGH (ref 27–34)
MCV RBC AUTO: 114.7 FL — HIGH (ref 80–100)
NITRITE UR-MCNC: NEGATIVE — SIGNIFICANT CHANGE UP
NRBC # FLD: 0.03 K/UL — SIGNIFICANT CHANGE UP (ref 0–0)
OSMOLALITY UR: 113 MOSMO/KG — SIGNIFICANT CHANGE UP (ref 50–1200)
PH UR: 7 — SIGNIFICANT CHANGE UP (ref 5–8)
PLATELET # BLD AUTO: 330 K/UL — SIGNIFICANT CHANGE UP (ref 150–400)
PMV BLD: 9.4 FL — SIGNIFICANT CHANGE UP (ref 7–13)
POTASSIUM SERPL-MCNC: 3.7 MMOL/L — SIGNIFICANT CHANGE UP (ref 3.5–5.3)
POTASSIUM SERPL-SCNC: 3.7 MMOL/L — SIGNIFICANT CHANGE UP (ref 3.5–5.3)
PROT UR-MCNC: 11 MG/DL — SIGNIFICANT CHANGE UP
PROT UR-MCNC: NEGATIVE — SIGNIFICANT CHANGE UP
RBC # BLD: 2.24 M/UL — LOW (ref 3.8–5.2)
RBC # FLD: 13.3 % — SIGNIFICANT CHANGE UP (ref 10.3–14.5)
RBC CASTS # UR COMP ASSIST: SIGNIFICANT CHANGE UP (ref 0–?)
SODIUM SERPL-SCNC: 138 MMOL/L — SIGNIFICANT CHANGE UP (ref 135–145)
SODIUM UR-SCNC: 22 MMOL/L — SIGNIFICANT CHANGE UP
SP GR SPEC: 1.01 — SIGNIFICANT CHANGE UP (ref 1–1.04)
SQUAMOUS # UR AUTO: SIGNIFICANT CHANGE UP
UROBILINOGEN FLD QL: NORMAL — SIGNIFICANT CHANGE UP
UUN UR-MCNC: 127.6 MG/DL — SIGNIFICANT CHANGE UP
WBC # BLD: 18.52 K/UL — HIGH (ref 3.8–10.5)
WBC # FLD AUTO: 18.52 K/UL — HIGH (ref 3.8–10.5)
WBC UR QL: SIGNIFICANT CHANGE UP (ref 0–?)

## 2019-09-02 PROCEDURE — 99223 1ST HOSP IP/OBS HIGH 75: CPT | Mod: GC

## 2019-09-02 PROCEDURE — 99233 SBSQ HOSP IP/OBS HIGH 50: CPT

## 2019-09-02 PROCEDURE — 71045 X-RAY EXAM CHEST 1 VIEW: CPT | Mod: 26,77

## 2019-09-02 PROCEDURE — 71045 X-RAY EXAM CHEST 1 VIEW: CPT | Mod: 26,76

## 2019-09-02 RX ORDER — MAGNESIUM SULFATE 500 MG/ML
1 VIAL (ML) INJECTION ONCE
Refills: 0 | Status: COMPLETED | OUTPATIENT
Start: 2019-09-02 | End: 2019-09-02

## 2019-09-02 RX ORDER — SODIUM CHLORIDE 9 MG/ML
500 INJECTION, SOLUTION INTRAVENOUS ONCE
Refills: 0 | Status: COMPLETED | OUTPATIENT
Start: 2019-09-02 | End: 2019-09-02

## 2019-09-02 RX ORDER — ACETAMINOPHEN 500 MG
650 TABLET ORAL ONCE
Refills: 0 | Status: COMPLETED | OUTPATIENT
Start: 2019-09-02 | End: 2019-09-02

## 2019-09-02 RX ORDER — POTASSIUM CHLORIDE 20 MEQ
40 PACKET (EA) ORAL ONCE
Refills: 0 | Status: COMPLETED | OUTPATIENT
Start: 2019-09-02 | End: 2019-09-02

## 2019-09-02 RX ADMIN — LEVALBUTEROL 0.63 MILLIGRAM(S): 1.25 SOLUTION, CONCENTRATE RESPIRATORY (INHALATION) at 03:52

## 2019-09-02 RX ADMIN — ESCITALOPRAM OXALATE 20 MILLIGRAM(S): 10 TABLET, FILM COATED ORAL at 13:03

## 2019-09-02 RX ADMIN — PIPERACILLIN AND TAZOBACTAM 25 GRAM(S): 4; .5 INJECTION, POWDER, LYOPHILIZED, FOR SOLUTION INTRAVENOUS at 22:10

## 2019-09-02 RX ADMIN — HEPARIN SODIUM 5000 UNIT(S): 5000 INJECTION INTRAVENOUS; SUBCUTANEOUS at 06:12

## 2019-09-02 RX ADMIN — PIPERACILLIN AND TAZOBACTAM 25 GRAM(S): 4; .5 INJECTION, POWDER, LYOPHILIZED, FOR SOLUTION INTRAVENOUS at 13:38

## 2019-09-02 RX ADMIN — Medication 40 MILLIEQUIVALENT(S): at 06:11

## 2019-09-02 RX ADMIN — LEVALBUTEROL 0.63 MILLIGRAM(S): 1.25 SOLUTION, CONCENTRATE RESPIRATORY (INHALATION) at 15:20

## 2019-09-02 RX ADMIN — LEVALBUTEROL 0.63 MILLIGRAM(S): 1.25 SOLUTION, CONCENTRATE RESPIRATORY (INHALATION) at 21:40

## 2019-09-02 RX ADMIN — Medication 650 MILLIGRAM(S): at 08:52

## 2019-09-02 RX ADMIN — CHLORHEXIDINE GLUCONATE 1 APPLICATION(S): 213 SOLUTION TOPICAL at 06:13

## 2019-09-02 RX ADMIN — HEPARIN SODIUM 5000 UNIT(S): 5000 INJECTION INTRAVENOUS; SUBCUTANEOUS at 13:38

## 2019-09-02 RX ADMIN — FAMOTIDINE 20 MILLIGRAM(S): 10 INJECTION INTRAVENOUS at 06:11

## 2019-09-02 RX ADMIN — PIPERACILLIN AND TAZOBACTAM 25 GRAM(S): 4; .5 INJECTION, POWDER, LYOPHILIZED, FOR SOLUTION INTRAVENOUS at 06:12

## 2019-09-02 RX ADMIN — DORNASE ALFA 2.5 MILLIGRAM(S): 1 SOLUTION RESPIRATORY (INHALATION) at 10:27

## 2019-09-02 RX ADMIN — SODIUM CHLORIDE 4 MILLILITER(S): 9 INJECTION INTRAMUSCULAR; INTRAVENOUS; SUBCUTANEOUS at 15:23

## 2019-09-02 RX ADMIN — SODIUM CHLORIDE 30 MILLILITER(S): 9 INJECTION, SOLUTION INTRAVENOUS at 08:53

## 2019-09-02 RX ADMIN — HEPARIN SODIUM 5000 UNIT(S): 5000 INJECTION INTRAVENOUS; SUBCUTANEOUS at 22:10

## 2019-09-02 RX ADMIN — LEVALBUTEROL 0.63 MILLIGRAM(S): 1.25 SOLUTION, CONCENTRATE RESPIRATORY (INHALATION) at 10:24

## 2019-09-02 RX ADMIN — GABAPENTIN 300 MILLIGRAM(S): 400 CAPSULE ORAL at 13:03

## 2019-09-02 RX ADMIN — SODIUM CHLORIDE 4 MILLILITER(S): 9 INJECTION INTRAMUSCULAR; INTRAVENOUS; SUBCUTANEOUS at 21:49

## 2019-09-02 RX ADMIN — SODIUM CHLORIDE 4 MILLILITER(S): 9 INJECTION INTRAMUSCULAR; INTRAVENOUS; SUBCUTANEOUS at 10:24

## 2019-09-02 RX ADMIN — SODIUM CHLORIDE 30 MILLILITER(S): 9 INJECTION, SOLUTION INTRAVENOUS at 06:12

## 2019-09-02 RX ADMIN — SODIUM CHLORIDE 4 MILLILITER(S): 9 INJECTION INTRAMUSCULAR; INTRAVENOUS; SUBCUTANEOUS at 04:00

## 2019-09-02 RX ADMIN — Medication 650 MILLIGRAM(S): at 09:30

## 2019-09-02 RX ADMIN — FAMOTIDINE 20 MILLIGRAM(S): 10 INJECTION INTRAVENOUS at 17:42

## 2019-09-02 RX ADMIN — Medication 100 GRAM(S): at 06:11

## 2019-09-02 RX ADMIN — SODIUM CHLORIDE 1000 MILLILITER(S): 9 INJECTION, SOLUTION INTRAVENOUS at 06:12

## 2019-09-02 NOTE — CONSULT NOTE ADULT - ATTENDING COMMENTS
79 year old s/p vats for lung nodule with post operative pneumonia- growing pseudomonas  Now with Diarrhea    1) Pneumonia with pseudomonas  Can stop vancomycin  Continue Zosyn pending sensitivity from pseudomonas    2) Diarrhea  Hold senna/ colace    If loose stool persists, check C diff    3) Leukocytosis: multifactorial  recent surgery/ pna/ diarrhea    Continue to monitor

## 2019-09-03 LAB
-  AMIKACIN: SIGNIFICANT CHANGE UP
-  AZTREONAM: SIGNIFICANT CHANGE UP
-  CEFEPIME: SIGNIFICANT CHANGE UP
-  CEFTAZIDIME: SIGNIFICANT CHANGE UP
-  CIPROFLOXACIN: SIGNIFICANT CHANGE UP
-  GENTAMICIN: SIGNIFICANT CHANGE UP
-  IMIPENEM: SIGNIFICANT CHANGE UP
-  LEVOFLOXACIN: SIGNIFICANT CHANGE UP
-  MEROPENEM: SIGNIFICANT CHANGE UP
-  PIPERACILLIN/TAZOBACTAM: SIGNIFICANT CHANGE UP
-  TOBRAMYCIN: SIGNIFICANT CHANGE UP
ALBUMIN SERPL ELPH-MCNC: 3 G/DL — LOW (ref 3.3–5)
ALP SERPL-CCNC: 117 U/L — SIGNIFICANT CHANGE UP (ref 40–120)
ALT FLD-CCNC: 24 U/L — SIGNIFICANT CHANGE UP (ref 4–33)
ANION GAP SERPL CALC-SCNC: 11 MMO/L — SIGNIFICANT CHANGE UP (ref 7–14)
ANION GAP SERPL CALC-SCNC: 12 MMO/L — SIGNIFICANT CHANGE UP (ref 7–14)
AST SERPL-CCNC: 33 U/L — HIGH (ref 4–32)
BACTERIA SPT RESP CULT: SIGNIFICANT CHANGE UP
BILIRUB SERPL-MCNC: 0.3 MG/DL — SIGNIFICANT CHANGE UP (ref 0.2–1.2)
BUN SERPL-MCNC: 20 MG/DL — SIGNIFICANT CHANGE UP (ref 7–23)
BUN SERPL-MCNC: 20 MG/DL — SIGNIFICANT CHANGE UP (ref 7–23)
CALCIUM SERPL-MCNC: 9.1 MG/DL — SIGNIFICANT CHANGE UP (ref 8.4–10.5)
CALCIUM SERPL-MCNC: 9.3 MG/DL — SIGNIFICANT CHANGE UP (ref 8.4–10.5)
CHLORIDE SERPL-SCNC: 105 MMOL/L — SIGNIFICANT CHANGE UP (ref 98–107)
CHLORIDE SERPL-SCNC: 107 MMOL/L — SIGNIFICANT CHANGE UP (ref 98–107)
CO2 SERPL-SCNC: 23 MMOL/L — SIGNIFICANT CHANGE UP (ref 22–31)
CO2 SERPL-SCNC: 23 MMOL/L — SIGNIFICANT CHANGE UP (ref 22–31)
CREAT SERPL-MCNC: 1.98 MG/DL — HIGH (ref 0.5–1.3)
CREAT SERPL-MCNC: 2.08 MG/DL — HIGH (ref 0.5–1.3)
GLUCOSE SERPL-MCNC: 121 MG/DL — HIGH (ref 70–99)
GLUCOSE SERPL-MCNC: 124 MG/DL — HIGH (ref 70–99)
GRAM STN SPT: SIGNIFICANT CHANGE UP
HCT VFR BLD CALC: 25.7 % — LOW (ref 34.5–45)
HGB BLD-MCNC: 8.5 G/DL — LOW (ref 11.5–15.5)
MAGNESIUM SERPL-MCNC: 2.3 MG/DL — SIGNIFICANT CHANGE UP (ref 1.6–2.6)
MAGNESIUM SERPL-MCNC: 2.4 MG/DL — SIGNIFICANT CHANGE UP (ref 1.6–2.6)
MCHC RBC-ENTMCNC: 33.1 % — SIGNIFICANT CHANGE UP (ref 32–36)
MCHC RBC-ENTMCNC: 38.1 PG — HIGH (ref 27–34)
MCV RBC AUTO: 115.2 FL — HIGH (ref 80–100)
METHOD TYPE: SIGNIFICANT CHANGE UP
NRBC # FLD: 0 K/UL — SIGNIFICANT CHANGE UP (ref 0–0)
ORGANISM # SPEC MICROSCOPIC CNT: SIGNIFICANT CHANGE UP
ORGANISM # SPEC MICROSCOPIC CNT: SIGNIFICANT CHANGE UP
PHOSPHATE SERPL-MCNC: 1.9 MG/DL — LOW (ref 2.5–4.5)
PHOSPHATE SERPL-MCNC: 3.3 MG/DL — SIGNIFICANT CHANGE UP (ref 2.5–4.5)
PLATELET # BLD AUTO: 362 K/UL — SIGNIFICANT CHANGE UP (ref 150–400)
PMV BLD: 9.5 FL — SIGNIFICANT CHANGE UP (ref 7–13)
POTASSIUM SERPL-MCNC: 3.6 MMOL/L — SIGNIFICANT CHANGE UP (ref 3.5–5.3)
POTASSIUM SERPL-MCNC: 4.1 MMOL/L — SIGNIFICANT CHANGE UP (ref 3.5–5.3)
POTASSIUM SERPL-SCNC: 3.6 MMOL/L — SIGNIFICANT CHANGE UP (ref 3.5–5.3)
POTASSIUM SERPL-SCNC: 4.1 MMOL/L — SIGNIFICANT CHANGE UP (ref 3.5–5.3)
PROT SERPL-MCNC: 6.1 G/DL — SIGNIFICANT CHANGE UP (ref 6–8.3)
RBC # BLD: 2.23 M/UL — LOW (ref 3.8–5.2)
RBC # FLD: 13.6 % — SIGNIFICANT CHANGE UP (ref 10.3–14.5)
SODIUM SERPL-SCNC: 140 MMOL/L — SIGNIFICANT CHANGE UP (ref 135–145)
SODIUM SERPL-SCNC: 141 MMOL/L — SIGNIFICANT CHANGE UP (ref 135–145)
WBC # BLD: 16.23 K/UL — HIGH (ref 3.8–10.5)
WBC # FLD AUTO: 16.23 K/UL — HIGH (ref 3.8–10.5)

## 2019-09-03 PROCEDURE — 99233 SBSQ HOSP IP/OBS HIGH 50: CPT

## 2019-09-03 PROCEDURE — 76770 US EXAM ABDO BACK WALL COMP: CPT | Mod: 26

## 2019-09-03 PROCEDURE — 71045 X-RAY EXAM CHEST 1 VIEW: CPT | Mod: 26

## 2019-09-03 PROCEDURE — 99232 SBSQ HOSP IP/OBS MODERATE 35: CPT

## 2019-09-03 RX ORDER — CIPROFLOXACIN LACTATE 400MG/40ML
500 VIAL (ML) INTRAVENOUS DAILY
Refills: 0 | Status: DISCONTINUED | OUTPATIENT
Start: 2019-09-03 | End: 2019-09-04

## 2019-09-03 RX ORDER — POTASSIUM PHOSPHATE, MONOBASIC POTASSIUM PHOSPHATE, DIBASIC 236; 224 MG/ML; MG/ML
15 INJECTION, SOLUTION INTRAVENOUS ONCE
Refills: 0 | Status: DISCONTINUED | OUTPATIENT
Start: 2019-09-03 | End: 2019-09-03

## 2019-09-03 RX ORDER — CEFEPIME 1 G/1
2000 INJECTION, POWDER, FOR SOLUTION INTRAMUSCULAR; INTRAVENOUS DAILY
Refills: 0 | Status: DISCONTINUED | OUTPATIENT
Start: 2019-09-03 | End: 2019-09-03

## 2019-09-03 RX ORDER — SODIUM CHLORIDE 9 MG/ML
1000 INJECTION, SOLUTION INTRAVENOUS
Refills: 0 | Status: DISCONTINUED | OUTPATIENT
Start: 2019-09-03 | End: 2019-09-04

## 2019-09-03 RX ADMIN — PIPERACILLIN AND TAZOBACTAM 25 GRAM(S): 4; .5 INJECTION, POWDER, LYOPHILIZED, FOR SOLUTION INTRAVENOUS at 05:10

## 2019-09-03 RX ADMIN — LEVALBUTEROL 0.63 MILLIGRAM(S): 1.25 SOLUTION, CONCENTRATE RESPIRATORY (INHALATION) at 04:09

## 2019-09-03 RX ADMIN — SODIUM CHLORIDE 4 MILLILITER(S): 9 INJECTION INTRAMUSCULAR; INTRAVENOUS; SUBCUTANEOUS at 04:14

## 2019-09-03 RX ADMIN — FAMOTIDINE 20 MILLIGRAM(S): 10 INJECTION INTRAVENOUS at 05:09

## 2019-09-03 RX ADMIN — DORNASE ALFA 2.5 MILLIGRAM(S): 1 SOLUTION RESPIRATORY (INHALATION) at 12:02

## 2019-09-03 RX ADMIN — HEPARIN SODIUM 5000 UNIT(S): 5000 INJECTION INTRAVENOUS; SUBCUTANEOUS at 14:37

## 2019-09-03 RX ADMIN — CHLORHEXIDINE GLUCONATE 1 APPLICATION(S): 213 SOLUTION TOPICAL at 05:10

## 2019-09-03 RX ADMIN — FAMOTIDINE 20 MILLIGRAM(S): 10 INJECTION INTRAVENOUS at 18:23

## 2019-09-03 RX ADMIN — CEFEPIME 100 MILLIGRAM(S): 1 INJECTION, POWDER, FOR SOLUTION INTRAMUSCULAR; INTRAVENOUS at 14:38

## 2019-09-03 RX ADMIN — HEPARIN SODIUM 5000 UNIT(S): 5000 INJECTION INTRAVENOUS; SUBCUTANEOUS at 22:39

## 2019-09-03 RX ADMIN — LEVALBUTEROL 0.63 MILLIGRAM(S): 1.25 SOLUTION, CONCENTRATE RESPIRATORY (INHALATION) at 17:36

## 2019-09-03 RX ADMIN — LEVALBUTEROL 0.63 MILLIGRAM(S): 1.25 SOLUTION, CONCENTRATE RESPIRATORY (INHALATION) at 12:01

## 2019-09-03 RX ADMIN — Medication 63.75 MILLIMOLE(S): at 07:00

## 2019-09-03 RX ADMIN — Medication 500 MILLIGRAM(S): at 23:34

## 2019-09-03 RX ADMIN — SODIUM CHLORIDE 30 MILLILITER(S): 9 INJECTION, SOLUTION INTRAVENOUS at 05:10

## 2019-09-03 RX ADMIN — HEPARIN SODIUM 5000 UNIT(S): 5000 INJECTION INTRAVENOUS; SUBCUTANEOUS at 05:09

## 2019-09-03 RX ADMIN — ESCITALOPRAM OXALATE 20 MILLIGRAM(S): 10 TABLET, FILM COATED ORAL at 14:38

## 2019-09-03 RX ADMIN — GABAPENTIN 300 MILLIGRAM(S): 400 CAPSULE ORAL at 14:38

## 2019-09-03 RX ADMIN — SODIUM CHLORIDE 75 MILLILITER(S): 9 INJECTION, SOLUTION INTRAVENOUS at 15:22

## 2019-09-03 NOTE — CONSULT NOTE ADULT - ASSESSMENT
Elderly female c hx COPD sp VATS and now with non-oliguric RAUL.  Chronologically started p the abx. Classically too soon unless had a previous exposure?  The urine osmolarity of 111 goes against RAUL from dehydration (from diarrhea)  She is not post obstructive    1 Renal -Can we increase the LR to 70 cc/hr for 24 hours  2 ID-Spoke with ID attd who will switch the abx to Cefepime and await sensitivities to see if fluoroquinolones can be used.  Check urine for eosinophils   3 Pulm-Standard DVT prophylaxis and nebs
79F PMHx of emphysema who is s/p VATS and SERA resection for lung nodule POD#5, hospital course complicated by pseudomonas pneumonia and diarrhea.    1. Pseudomonas pneumonia  - Continue with Zosyn 3.375 q 8 hours   - Follow up sputum culture sensitivities  - Stop vancomycin   - Monitor for fevers  - trend WBCs    2. Diarrhea - seems to have resolved with discontinuation of bowel regimen  - If diarrhea recurs, would send Cdiff and isolate

## 2019-09-03 NOTE — DIETITIAN INITIAL EVALUATION ADULT. - OTHER INFO
Pt. reports no recent wt. changes, no issues with PO nutrition, no known food allergies, able to take . 75% of the meals .

## 2019-09-03 NOTE — CONSULT NOTE ADULT - SUBJECTIVE AND OBJECTIVE BOX
NEPHROLOGY - NSN    Patient seen and examined.    HPI:  79 year old female presents to presurgical testing with diagnosis of solitary pulmonary nodule scheduled for flexible bronchoscopy, left video assisted thoracoscopy, lung resection for 19. Pt with hx of emphysema, recently started on trelegy, found a left upper lung nodule during work up for URI. Pt quit smoking in 2019. Pt with SOB with activity and chronic cough. (19 Aug 2019 17:23)  She had the procedure and did have some post op pain.  The patient then had an infiltrate on CXR.  Aspiration had pseudomonas growing.  On  she was placed on IV abx.  On  the creatinine started to rise.  She also had co-current diarrhea that the pt states is actually loose and not watery.  No CT scan were done and no NSAIDS  Pt has hx of HTN for years but no CVA.  60 pack year smoking and she has COPD.  No Cardiac disease noted.    There is no hematuria or bubbles in the urine.  No history of NSAIDS or nephrolithisis.  The patient urinates once or twice in the night and there is no incontinence.  No family hx or renal disease or back pain.    No recent abx use.  No alleviating or aggravating factors with respect to the kidneys.        PAST MEDICAL & SURGICAL HISTORY:  Anxiety and depression  Arthritis  Lower back pain  Solitary pulmonary nodule  Emphysema lung  HTN (hypertension)  No significant past surgical history      MEDICATIONS  (STANDING):  chlorhexidine 4% Liquid 1 Application(s) Topical every 24 hours  escitalopram 20 milliGRAM(s) Oral daily  famotidine    Tablet 20 milliGRAM(s) Oral every 12 hours  gabapentin 300 milliGRAM(s) Oral daily  heparin  Injectable 5000 Unit(s) SubCutaneous every 8 hours  lactated ringers. 1000 milliLiter(s) (30 mL/Hr) IV Continuous <Continuous>  levalbuterol Inhalation 0.63 milliGRAM(s) Inhalation every 6 hours  piperacillin/tazobactam IVPB.. 3.375 Gram(s) IV Intermittent every 8 hours      Allergies    No Known Allergies    Intolerances        SOCIAL HISTORY:  Denies alcohol abuse, drug abuse or tobacco usage.     FAMILY HISTORY:      VITALS:  T(C): 36.9 (19 @ 12:00), Max: 37.3 (19 @ 00:00)  HR: 89 (19 @ 11:00) (89 - 113)  BP: 152/69 (19 @ 11:00) (109/79 - 159/69)  RR: 16 (19 @ 11:00) (16 - 28)  SpO2: 97% (19 @ 11:00) (93% - 100%)    REVIEW OF SYSTEMS:  Denies any nausea, vomiting, diarrhea, fever or chills. Denies chest pain, SOB, focal weakness, hematuria or dysuria. Good oral intake and denies fatigue or weakness. All other pertinent systems are reviewed and are negative.    PHYSICAL EXAM:  Constitutional: NAD  HEENT: EOMI  Neck:  No JVD, supple   Respiratory: CTA B/L  Cardiovascular: S1 and S2, RRR  Gastrointestinal: + BS, soft, NT, ND  Extremities: No peripheral edema, + peripheral pulses  Neurological: A/O x 3, CN2-12 intact  Psychiatric: Normal mood, normal affect  : No Glaser  Skin: No rashes, C/D/I  Access: Not applicable    I and O's:     @ :  -   @ 07:00  --------------------------------------------------------  IN: 2480 mL / OUT: 1465 mL / NET: 1015 mL     @ 07:  -   @ 07:00  --------------------------------------------------------  IN: 1500 mL / OUT: 730 mL / NET: 770 mL     @ 07:  -   @ 13:08  --------------------------------------------------------  IN: 600 mL / OUT: 200 mL / NET: 400 mL          LABS:                        8.5    16.23 )-----------( 362      ( 03 Sep 2019 03:40 )             25.7         141  |  107  |  20  ----------------------------<  124<H>  4.1   |  23  |  2.08<H>    Ca    9.1      03 Sep 2019 03:40  Phos  1.9       Mg     2.4             URINE:  Urinalysis Basic - ( 02 Sep 2019 06:20 )    Color: COLORLESS / Appearance: CLEAR / S.006 / pH: 7.0  Gluc: NEGATIVE / Ketone: NEGATIVE  / Bili: NEGATIVE / Urobili: NORMAL   Blood: NEGATIVE / Protein: NEGATIVE / Nitrite: NEGATIVE   Leuk Esterase: TRACE / RBC: 0-2 / WBC 3-5   Sq Epi: FEW / Non Sq Epi: x / Bacteria: NEGATIVE      Protein, Random Urine: 11.0 mg/dL ( @ 06:20)  Sodium, Random Urine: 22 mmol/L ( @ 06:20)  Creatinine, Random Urine: 21.20 mg/dL ( @ 06:20)  Osmolality, Random Urine: 113 mosmo/kg ( @ 06:20)    RADIOLOGY & ADDITIONAL STUDIES:   < from: US Kidney and Bladder (19 @ 11:27) >    EXAM:  US KIDNEYS AND BLADDER        PROCEDURE DATE:  Sep  3 2019         INTERPRETATION:  CLINICAL INFORMATION: Renal failure    COMPARISON: 2019.    TECHNIQUE: Sonography of the kidneys and bladder.     FINDINGS:    Right kidney:  11 cm. No renal mass, hydronephrosis or calculi. A 1.8 cm   cyst.    Left kidney:  11 cm. No renal mass, hydronephrosis or calculi.    Urinary bladder: Within normal limits.    IMPRESSION:     No hydronephrosis.                  ALPHONSO PEARSON M.D., ATTENDING RADIOLOGIST  This document has been electronically signed. Sep  3 2019 12:40PM                  < end of copied text >
INFECTIOUS DISEASE SERVICE INITIAL CONSULTATION NOTE    HPI:  79F PMHx of emphysema who is s/p VATS and SERA resection for lung nodule POD#5. Her hospital course is complicated by development of fevers and pneumonia treated with vancomycin and Zosyn. On POD#4, patient developed diarrhea. ID consulted for antibiotic management.   The patient reports that her last episode of diarrhea was last night. Still have cough productive of sputum, but is now back to her normal cough. No pain at her chest tube site. No dysuria.       PAST MEDICAL & SURGICAL HISTORY:  Anxiety and depression  Arthritis  Lower back pain  Solitary pulmonary nodule  Emphysema lung  HTN (hypertension)  No significant past surgical history      REVIEW OF SYSTEMS:  Constitutional: no weakness, no fevers, no chills  Dermatologic: no rash  Respiratory: no SOB, +cough  Cardiovascular: no chest pain, no palpitations  Gastrointestinal: no nausea, no vomiting, +diarrhea  Genitourinary: no dysuria, no urinary frequency, no hematuria, no urinary retention  Musculoskeletal:	no weakness, no joint swelling/pain  Neurological: no focal weakness or numbness  Endocrine: no polyuria, no polydipsia    ACTIVE ANTIMICROBIAL/ANTIBIOTIC MEDICATIONS:  piperacillin/tazobactam IVPB.. 3.375 Gram(s) IV Intermittent every 8 hours      OTHER MEDICATIONS:  chlorhexidine 4% Liquid 1 Application(s) Topical every 24 hours  dornase june Solution 2.5 milliGRAM(s) Inhalation daily  escitalopram 20 milliGRAM(s) Oral daily  famotidine    Tablet 20 milliGRAM(s) Oral every 12 hours  gabapentin 300 milliGRAM(s) Oral daily  heparin  Injectable 5000 Unit(s) SubCutaneous every 8 hours  lactated ringers. 1000 milliLiter(s) IV Continuous <Continuous>  levalbuterol Inhalation 0.63 milliGRAM(s) Inhalation every 6 hours  ondansetron Injectable 4 milliGRAM(s) IV Push once PRN  oxyCODONE    IR 5 milliGRAM(s) Oral every 4 hours PRN  oxyCODONE    IR 10 milliGRAM(s) Oral every 4 hours PRN  sodium chloride 3%  Inhalation 4 milliLiter(s) Inhalation every 6 hours      ALLERGIES:  Allergies    No Known Allergies    Intolerances        SOCIAL HISTORY: Former smoker, no alcohol     FAMILY HISTORY: non contributory       VITAL SIGNS:  ICU Vital Signs Last 24 Hrs  T(C): 36.8 (02 Sep 2019 16:00), Max: 37.1 (01 Sep 2019 20:00)  T(F): 98.3 (02 Sep 2019 16:00), Max: 98.8 (01 Sep 2019 20:00)  HR: 96 (02 Sep 2019 16:00) (91 - 117)  BP: 128/58 (02 Sep 2019 16:00) (98/79 - 172/66)  BP(mean): 75 (02 Sep 2019 16:00) (62 - 93)  ABP: --  ABP(mean): --  RR: 19 (02 Sep 2019 16:00) (16 - 29)  SpO2: 98% (02 Sep 2019 16:00) (93% - 100%)      PHYSICAL EXAM:  Constitutional: WDWN  Head: NC/AT  Eyes: anicteric sclera  ENMT: no rhinorrhea; no sinus tenderness on palpation; no oropharyngeal lesions, erythema, or exudates	  Neck: supple; no JVD or LAD  Respiratory: Decreased breath sounds throughout. left sided chest tube draining serous fluid, insertion site clean, dry and NT  Cardiovascular: +S1/S2, RRR; no appreciable murmurs  Gastrointestinal: soft, NT/ND; +BSx4, no HSM  Extremities: WWP; no clubbing, cyanosis, or edema  Dermatologic: skin warm and dry; no visible rashes or lesions  Neurologic: AAOx3; no focal deficits    LABS:                        8.6    18.52 )-----------( 330      ( 02 Sep 2019 03:10 )             25.7     09    138  |  104  |  16  ----------------------------<  113<H>  3.7   |  23  |  1.40<H>    Ca    8.8      02 Sep 2019 03:10  Phos  2.7     09  Mg     1.9             Urinalysis Basic - ( 02 Sep 2019 06:20 )    Color: COLORLESS / Appearance: CLEAR / S.006 / pH: 7.0  Gluc: NEGATIVE / Ketone: NEGATIVE  / Bili: NEGATIVE / Urobili: NORMAL   Blood: NEGATIVE / Protein: NEGATIVE / Nitrite: NEGATIVE   Leuk Esterase: TRACE / RBC: 0-2 / WBC 3-5   Sq Epi: FEW / Non Sq Epi: x / Bacteria: NEGATIVE        MICROBIOLOGY:    Culture - Respiratory with Gram Stain (collected 19 @ 17:03)  Source: SPUTUM  Preliminary Report (19 @ 11:34):    PSA^Pseudomonas aeruginosa    QUANTITY OF GROWTH: FEW    Culture - Blood (collected 19 @ 08:05)  Source: BLOOD PERIPHERAL  Preliminary Report (19 @ 08:05):    NO ORGANISMS ISOLATED    NO ORGANISMS ISOLATED AT 48 HRS.        RADIOLOGY & ADDITIONAL STUDIES:

## 2019-09-03 NOTE — PROGRESS NOTE ADULT - ASSESSMENT
79 year old s/p vats for lung nodule with post operative pneumonia- growing pseudomonas  Now with Diarrhea    1) Pneumonia with pseudomonas    Continue Zosyn pending sensitivity from pseudomonas  Day 4  Pt appears very well. 7-10 day course may be adequate     2) Diarrhea- resolved  Hold senna/ colace      3) Leukocytosis: multifactorial  recent surgery/ pna/ diarrhea    Continue to monitor/ improved

## 2019-09-04 ENCOUNTER — TRANSCRIPTION ENCOUNTER (OUTPATIENT)
Age: 79
End: 2019-09-04

## 2019-09-04 VITALS
OXYGEN SATURATION: 95 % | RESPIRATION RATE: 18 BRPM | SYSTOLIC BLOOD PRESSURE: 158 MMHG | TEMPERATURE: 98 F | HEART RATE: 86 BPM | DIASTOLIC BLOOD PRESSURE: 62 MMHG

## 2019-09-04 LAB
ALBUMIN SERPL ELPH-MCNC: 2.7 G/DL — LOW (ref 3.3–5)
ALP SERPL-CCNC: 115 U/L — SIGNIFICANT CHANGE UP (ref 40–120)
ALT FLD-CCNC: 28 U/L — SIGNIFICANT CHANGE UP (ref 4–33)
ANION GAP SERPL CALC-SCNC: 12 MMO/L — SIGNIFICANT CHANGE UP (ref 7–14)
AST SERPL-CCNC: 33 U/L — HIGH (ref 4–32)
BILIRUB SERPL-MCNC: 0.3 MG/DL — SIGNIFICANT CHANGE UP (ref 0.2–1.2)
BUN SERPL-MCNC: 19 MG/DL — SIGNIFICANT CHANGE UP (ref 7–23)
CALCIUM SERPL-MCNC: 9.3 MG/DL — SIGNIFICANT CHANGE UP (ref 8.4–10.5)
CHLORIDE SERPL-SCNC: 107 MMOL/L — SIGNIFICANT CHANGE UP (ref 98–107)
CO2 SERPL-SCNC: 23 MMOL/L — SIGNIFICANT CHANGE UP (ref 22–31)
CREAT SERPL-MCNC: 1.85 MG/DL — HIGH (ref 0.5–1.3)
EOSINOPHIL NFR URNS MANUAL: SIGNIFICANT CHANGE UP (ref 0–0)
GLUCOSE SERPL-MCNC: 107 MG/DL — HIGH (ref 70–99)
HCT VFR BLD CALC: 24.3 % — LOW (ref 34.5–45)
HGB BLD-MCNC: 7.8 G/DL — LOW (ref 11.5–15.5)
MCHC RBC-ENTMCNC: 32.1 % — SIGNIFICANT CHANGE UP (ref 32–36)
MCHC RBC-ENTMCNC: 37.3 PG — HIGH (ref 27–34)
MCV RBC AUTO: 116.3 FL — HIGH (ref 80–100)
NRBC # FLD: 0.04 K/UL — SIGNIFICANT CHANGE UP (ref 0–0)
PLATELET # BLD AUTO: 391 K/UL — SIGNIFICANT CHANGE UP (ref 150–400)
PMV BLD: 9.6 FL — SIGNIFICANT CHANGE UP (ref 7–13)
POTASSIUM SERPL-MCNC: 3.7 MMOL/L — SIGNIFICANT CHANGE UP (ref 3.5–5.3)
POTASSIUM SERPL-SCNC: 3.7 MMOL/L — SIGNIFICANT CHANGE UP (ref 3.5–5.3)
PROT SERPL-MCNC: 5.9 G/DL — LOW (ref 6–8.3)
RBC # BLD: 2.09 M/UL — LOW (ref 3.8–5.2)
RBC # FLD: 13.6 % — SIGNIFICANT CHANGE UP (ref 10.3–14.5)
SODIUM SERPL-SCNC: 142 MMOL/L — SIGNIFICANT CHANGE UP (ref 135–145)
WBC # BLD: 12.35 K/UL — HIGH (ref 3.8–10.5)
WBC # FLD AUTO: 12.35 K/UL — HIGH (ref 3.8–10.5)

## 2019-09-04 PROCEDURE — 71045 X-RAY EXAM CHEST 1 VIEW: CPT | Mod: 26

## 2019-09-04 RX ORDER — OXYCODONE HYDROCHLORIDE 5 MG/1
1 TABLET ORAL
Qty: 30 | Refills: 0
Start: 2019-09-04 | End: 2019-09-08

## 2019-09-04 RX ORDER — OXYCODONE HYDROCHLORIDE 5 MG/1
1 TABLET ORAL
Qty: 0 | Refills: 0 | DISCHARGE
Start: 2019-09-04

## 2019-09-04 RX ORDER — CIPROFLOXACIN LACTATE 400MG/40ML
1 VIAL (ML) INTRAVENOUS
Qty: 6 | Refills: 0
Start: 2019-09-04 | End: 2019-09-09

## 2019-09-04 RX ORDER — MELOXICAM 15 MG/1
1 TABLET ORAL
Qty: 0 | Refills: 0 | DISCHARGE

## 2019-09-04 RX ADMIN — LEVALBUTEROL 0.63 MILLIGRAM(S): 1.25 SOLUTION, CONCENTRATE RESPIRATORY (INHALATION) at 04:01

## 2019-09-04 RX ADMIN — HEPARIN SODIUM 5000 UNIT(S): 5000 INJECTION INTRAVENOUS; SUBCUTANEOUS at 05:41

## 2019-09-04 RX ADMIN — FAMOTIDINE 20 MILLIGRAM(S): 10 INJECTION INTRAVENOUS at 05:41

## 2019-09-04 RX ADMIN — LEVALBUTEROL 0.63 MILLIGRAM(S): 1.25 SOLUTION, CONCENTRATE RESPIRATORY (INHALATION) at 10:04

## 2019-09-04 RX ADMIN — GABAPENTIN 300 MILLIGRAM(S): 400 CAPSULE ORAL at 12:21

## 2019-09-04 RX ADMIN — ESCITALOPRAM OXALATE 20 MILLIGRAM(S): 10 TABLET, FILM COATED ORAL at 12:21

## 2019-09-04 RX ADMIN — Medication 500 MILLIGRAM(S): at 12:21

## 2019-09-04 NOTE — PROGRESS NOTE ADULT - ATTENDING COMMENTS
Renal attd    -pleased at the downward trend in creatinine  -No renal objection to dc with close outpt follow up    Will aim for Sept 19 th
Addendum 9 pm    Renal concerned about AIN.     Sensitivity reviewed    Can change to Cipro for 7 more days through 9/10  Dose once daily for now  If creatinine is less than 1.5---- change to q 12

## 2019-09-04 NOTE — PROGRESS NOTE ADULT - SUBJECTIVE AND OBJECTIVE BOX
[facility]  MRN-[id number]    Patient is a 79y old  Female who presents with a chief complaint of Left Upper Lobectomy (30 Aug 2019 06:45)    HPI:  79 year old female with a history of emphysema, prior smoking (quit 2019), hypertension, anxiety/depression was found to have a solitary pulmonary nodule is s/p flexible bronchoscopy, left video assisted thoracoscopy, left upper lobectomy on 19. Per history, the patient has emphysema and recently started on Trelegy. During a work-up for an upper respiratory tract infection a left upper lobe nodule was found incidentally. She endorses dyspnea with activity and chronic cough. She has no complaints.     PAST MEDICAL & SURGICAL HISTORY:  Anxiety and depression  Arthritis  Lower back pain  Solitary pulmonary nodule  Emphysema lung  HTN (hypertension)  No significant past surgical history    FAMILY HISTORY:  Unremarkable    Social History:  Denies illicit drug use or frequent alcohol consumption. Endorses prior smoking.     Allergies  No Known Allergies  MEDICATIONS  (STANDING):  chlorhexidine 4% Liquid 1 Application(s) Topical every 24 hours  dornase june Solution 2.5 milliGRAM(s) Inhalation daily  escitalopram 20 milliGRAM(s) Oral daily  famotidine    Tablet 20 milliGRAM(s) Oral every 12 hours  gabapentin 300 milliGRAM(s) Oral daily  heparin  Injectable 5000 Unit(s) SubCutaneous every 8 hours  lactated ringers. 1000 milliLiter(s) (30 mL/Hr) IV Continuous <Continuous>  levalbuterol Inhalation 0.63 milliGRAM(s) Inhalation every 6 hours  piperacillin/tazobactam IVPB.. 3.375 Gram(s) IV Intermittent every 8 hours  sodium phosphate IVPB 15 milliMole(s) IV Intermittent once    MEDICATIONS  (PRN):  ondansetron Injectable 4 milliGRAM(s) IV Push once PRN Nausea and/or Vomiting  oxyCODONE    IR 5 milliGRAM(s) Oral every 4 hours PRN Moderate Pain (4 - 6)  oxyCODONE    IR 10 milliGRAM(s) Oral every 4 hours PRN Severe Pain (7 - 10)    Review of Systems:  Constitutional:  Negative for weight change, fever, malaise  HEENT:  Negative for sinus pain, hoarseness, sore throat, dysphagia, vision changes  Cardiovascular:  Negative for chest pain, palpitations, dizziness  Respiratory:  Negative for cough, wheezing, dyspnea  Gastrointestinal:  Negative for nausea, vomiting, diarrhea, melena  Musculoskeletal:  Negative for pain, swelling, stiffness   Neuro:  Negative for weakness, numbness, headache  Psych:  Negative for anxiety, depression  Endocrine:  Negative for polyuria, polydipsia, temperature Intolerance    All other systems negative unless otherwise stated    ICU Vital Signs Last 24 Hrs  T(C): 37.1 (03 Sep 2019 04:00), Max: 37.3 (03 Sep 2019 00:00)  T(F): 98.8 (03 Sep 2019 04:00), Max: 99.2 (03 Sep 2019 00:00)  HR: 104 (03 Sep 2019 06:00) (91 - 113)  BP: 149/70 (03 Sep 2019 06:00) (109/54 - 172/66)  BP(mean): 90 (03 Sep 2019 06:00) (62 - 101)  ABP: --  ABP(mean): --  RR: 24 (03 Sep 2019 06:00) (16 - 28)  SpO2: 96% (03 Sep 2019 06:) (93% - 100%)    Daily     Daily Weight in k.3 (03 Sep 2019 05:00)  I&O's Summary    01 Sep 2019 07:01  -  02 Sep 2019 07:00  --------------------------------------------------------  IN: 2480 mL / OUT: 1465 mL / NET: 1015 mL    02 Sep 2019 07:01  -  03 Sep 2019 06:42  --------------------------------------------------------  IN: 1500 mL / OUT: 730 mL / NET: 770 mL    Physical Exam:  Gen: Alert, no apparent distress  CV: Regular rate and rhythm no murmurs, rubs or gallops  Pulm: Clear to auscultation bilaterally, no rales, rhonchi or wheezes  GI: Abd is soft, non-tender and non-distended with +BS  Ext: No clubbing, cyanosis or edema  Neuro: A+Ox3, follows commands and moves all extremities    Labs:                          8.5    16.23 )-----------( 362      ( 03 Sep 2019 03:40 )             25.7       09-    141  |  107  |  20  ----------------------------<  124<H>  4.1   |  23  |  2.08<H>    Ca    9.1      03 Sep 2019 03:40  Phos  1.9     -  Mg     2.4         Assessment/Plan: 79 year old female with a history of emphysema, prior smoking (quit 2019), hypertension, anxiety/depression was found to have a solitary pulmonary nodule is s/p flexible bronchoscopy, left video assisted thoracoscopy, left upper lobectomy on 19.    Neuro:   Pain control with Tylenol / Oxycodone  Continue Gabapentin  Continue Citalopram                                          Cardiovascular:    Stable hemodynamics  Not on any pressors  Continue hemodynamic monitoring    Respiratory:  Pt is comfortable on nasal cannula  Encourage incentive spirometry  Continue nebulizers    GI:  Tolerating regular diet  Continue bowel regimen, Pepcid  Continue Zofran for nausea - PRN	          Renal:  Worsening renal function; BUN normal but creatinine is rising  Unclear etiology, ? poor perfusion given prior day's diarrhea +/- Vancomycin   Check urine electrolytes and renal ultrasound; may also consult Renal  Continue LR 30CC/hr        Monitor I/Os and electrolytes    Hematologic / Oncology:  No signs of active bleeding, H/H stable  HSQ for DVT ppl    Infectious disease:  Pseudomonas in BAL, awaiting sensitivities, on Zosyn, off Vancomycin as of yesterday per ID  All surgical incision / chest tube sites look clean  No fever, decreasing leukocytosis     Endocrine:  Continue Accuchecks with coverage    All clinical, lab, hemodynamic and radiographic data were reviewed and the plan was discussed with CTICU team.     Ariel Hannah MD
Anesthesia Pain Management Service    SUBJECTIVE: Patient is doing well with IV PCA and no significant problems reported.    Pain Scale Score	At rest: 4/10___ 	With Activity: ___ 	[X ] Refer to charted pain scores    THERAPY:    [ ] IV PCA Morphine		[ ] 5 mg/mL	[ ] 1 mg/mL  [X ] IV PCA Hydromorphone	[ ] 5 mg/mL	[X ] 1 mg/mL  [ ] IV PCA Fentanyl		[ ] 50 micrograms/mL    Demand dose __0.2_ lockout __6_ (minutes) Continuous Rate _0__ Total: ___2.8  mg used (in past 24 hours)      MEDICATIONS  (STANDING):  acetaminophen   Tablet .. 650 milliGRAM(s) Oral every 6 hours  ALBUTerol/ipratropium for Nebulization 3 milliLiter(s) Nebulizer every 6 hours  docusate sodium 100 milliGRAM(s) Oral three times a day  escitalopram 20 milliGRAM(s) Oral daily  famotidine    Tablet 20 milliGRAM(s) Oral every 12 hours  gabapentin 300 milliGRAM(s) Oral daily  heparin  Injectable 5000 Unit(s) SubCutaneous every 8 hours  HYDROmorphone PCA (1 mG/mL) 30 milliLiter(s) PCA Continuous PCA Continuous  lactated ringers. 1000 milliLiter(s) (30 mL/Hr) IV Continuous <Continuous>  senna 2 Tablet(s) Oral at bedtime    MEDICATIONS  (PRN):  butorphanol Injectable 0.125 milliGRAM(s) IV Push every 6 hours PRN Pruritus  diphenhydrAMINE   Injectable 12.5 milliGRAM(s) IV Push every 4 hours PRN Pruritus  HYDROmorphone PCA (1 mG/mL) Rescue Clinician Bolus 0.5 milliGRAM(s) IV Push every 15 minutes PRN for Pain Scale GREATER THAN 6  metoclopramide Injectable 10 milliGRAM(s) IV Push once PRN Nausea and/or Vomiting  naloxone Injectable 0.1 milliGRAM(s) IV Push every 3 minutes PRN For ANY of the following changes in patient status:  A. RR LESS THAN 10 breaths per minute, B. Oxygen saturation LESS THAN 90%, C. Sedation score of 6  ondansetron Injectable 4 milliGRAM(s) IV Push every 6 hours PRN Nausea  ondansetron Injectable 4 milliGRAM(s) IV Push once PRN Nausea and/or Vomiting      OBJECTIVE:  Patient is sitting up in chair with CT.    Sedation Score:	[ X] Alert	[ ] Drowsy 	[ ] Arousable	[ ] Asleep	[ ] Unresponsive    Side Effects:	[X ] None	[ ] Nausea	[ ] Vomiting	[ ] Pruritus  		[ ] Other:    Vital Signs Last 24 Hrs  T(C): 36.6 (30 Aug 2019 08:00), Max: 36.9 (29 Aug 2019 20:00)  T(F): 97.9 (30 Aug 2019 08:00), Max: 98.4 (29 Aug 2019 20:00)  HR: 94 (30 Aug 2019 10:05) (84 - 109)  BP: 114/54 (30 Aug 2019 10:00) (100/53 - 160/65)  BP(mean): 68 (30 Aug 2019 10:00) (62 - 102)  RR: 18 (30 Aug 2019 10:05) (12 - 25)  SpO2: 97% (30 Aug 2019 10:05) (92% - 100%)    ASSESSMENT/ PLAN    Therapy to  be:	[ X] Continue   [ ] Discontinued   [ ] Change to prn Analgesics    Documentation and Verification of current medications:   [X] Done	[ ] Not done, not elligible    Comments: Discussed with team, patient will continue current pain regimen for now.
Anesthesia Pain Management Service    SUBJECTIVE: Patient is doing well with IV PCA and no significant problems reported.    Pain Scale Score	At rest: _4-5/10__ 	With Activity: ___ 	[X ] Refer to charted pain scores    THERAPY:    [ ] IV PCA Morphine		[ ] 5 mg/mL	[ ] 1 mg/mL  [X ] IV PCA Hydromorphone	[ ] 5 mg/mL	[X ] 1 mg/mL  [ ] IV PCA Fentanyl		[ ] 50 micrograms/mL    Demand dose __0.2_ lockout __6_ (minutes) Continuous Rate _0__ Total: _2__  mg used (in past 24 hours)      MEDICATIONS  (STANDING):  acetaminophen   Tablet .. 650 milliGRAM(s) Oral every 6 hours  ALBUTerol/ipratropium for Nebulization 3 milliLiter(s) Nebulizer every 6 hours  docusate sodium 100 milliGRAM(s) Oral three times a day  escitalopram 20 milliGRAM(s) Oral daily  famotidine    Tablet 20 milliGRAM(s) Oral every 12 hours  gabapentin 300 milliGRAM(s) Oral daily  HYDROmorphone PCA (1 mG/mL) 30 milliLiter(s) PCA Continuous PCA Continuous  lactated ringers. 1000 milliLiter(s) (30 mL/Hr) IV Continuous <Continuous>  senna 2 Tablet(s) Oral at bedtime    MEDICATIONS  (PRN):  butorphanol Injectable 0.125 milliGRAM(s) IV Push every 6 hours PRN Pruritus  diphenhydrAMINE   Injectable 12.5 milliGRAM(s) IV Push every 4 hours PRN Pruritus  HYDROmorphone PCA (1 mG/mL) Rescue Clinician Bolus 0.5 milliGRAM(s) IV Push every 15 minutes PRN for Pain Scale GREATER THAN 6  metoclopramide Injectable 10 milliGRAM(s) IV Push once PRN Nausea and/or Vomiting  naloxone Injectable 0.1 milliGRAM(s) IV Push every 3 minutes PRN For ANY of the following changes in patient status:  A. RR LESS THAN 10 breaths per minute, B. Oxygen saturation LESS THAN 90%, C. Sedation score of 6  ondansetron Injectable 4 milliGRAM(s) IV Push every 6 hours PRN Nausea  ondansetron Injectable 4 milliGRAM(s) IV Push once PRN Nausea and/or Vomiting      OBJECTIVE:  Patient is sitting up in chair with CT.    Sedation Score:	[ X] Alert	[ ] Drowsy 	[ ] Arousable	[ ] Asleep	[ ] Unresponsive    Side Effects:	[X ] None	[ ] Nausea	[ ] Vomiting	[ ] Pruritus  		[ ] Other:    Vital Signs Last 24 Hrs  T(C): 36.6 (29 Aug 2019 08:00), Max: 37 (29 Aug 2019 04:00)  T(F): 97.8 (29 Aug 2019 08:00), Max: 98.6 (29 Aug 2019 04:00)  HR: 82 (29 Aug 2019 09:18) (70 - 89)  BP: 104/70 (29 Aug 2019 09:00) (104/70 - 154/78)  BP(mean): 76 (29 Aug 2019 09:00) (71 - 85)  RR: 20 (29 Aug 2019 09:18) (14 - 29)  SpO2: 96% (29 Aug 2019 09:18) (94% - 100%)    ASSESSMENT/ PLAN    Therapy to  be:	[ X] Continue   [ ] Discontinued   [ ] Change to prn Analgesics    Documentation and Verification of current medications:   [X] Done	[ ] Not done, not elligible    Comments:  Continue current pain regimen, po Tylenol added.
Anesthesia Pain Management Service    SUBJECTIVE: Patient is doing well with IV PCA and no significant problems reported.    Pain Scale Score	At rest: ___ 	With Activity: ___ 	[X ] Refer to charted pain scores    THERAPY:    [ ] IV PCA Morphine		[ ] 5 mg/mL	[ ] 1 mg/mL  [X ] IV PCA Hydromorphone	[ ] 5 mg/mL	[X ] 1 mg/mL  [ ] IV PCA Fentanyl		[ ] 50 micrograms/mL    Demand dose __0.2_ lockout __6_ (minutes) Continuous Rate _0__ Total: _1.6__  Daily      MEDICATIONS  (STANDING):  ALBUTerol/ipratropium for Nebulization 3 milliLiter(s) Nebulizer every 6 hours  docusate sodium 100 milliGRAM(s) Oral three times a day  escitalopram 20 milliGRAM(s) Oral daily  famotidine    Tablet 20 milliGRAM(s) Oral every 12 hours  gabapentin 300 milliGRAM(s) Oral daily  heparin  Injectable 5000 Unit(s) SubCutaneous every 8 hours  HYDROmorphone PCA (1 mG/mL) 30 milliLiter(s) PCA Continuous PCA Continuous  lactated ringers. 1000 milliLiter(s) (30 mL/Hr) IV Continuous <Continuous>  lidocaine 4% Injection for Nebulization 4 milliLiter(s) Nebulizer once  piperacillin/tazobactam IVPB.. 3.375 Gram(s) IV Intermittent every 8 hours  senna 2 Tablet(s) Oral at bedtime  vancomycin  IVPB 1000 milliGRAM(s) IV Intermittent every 12 hours    MEDICATIONS  (PRN):  butorphanol Injectable 0.125 milliGRAM(s) IV Push every 6 hours PRN Pruritus  diphenhydrAMINE   Injectable 12.5 milliGRAM(s) IV Push every 4 hours PRN Pruritus  HYDROmorphone PCA (1 mG/mL) Rescue Clinician Bolus 0.5 milliGRAM(s) IV Push every 15 minutes PRN for Pain Scale GREATER THAN 6  metoclopramide Injectable 10 milliGRAM(s) IV Push once PRN Nausea and/or Vomiting  naloxone Injectable 0.1 milliGRAM(s) IV Push every 3 minutes PRN For ANY of the following changes in patient status:  A. RR LESS THAN 10 breaths per minute, B. Oxygen saturation LESS THAN 90%, C. Sedation score of 6  ondansetron Injectable 4 milliGRAM(s) IV Push every 6 hours PRN Nausea  ondansetron Injectable 4 milliGRAM(s) IV Push once PRN Nausea and/or Vomiting      OBJECTIVE:    Sedation Score:	[ X] Alert	[ ] Drowsy 	[ ] Arousable	[ ] Asleep	[ ] Unresponsive    Side Effects:	[X ] None	[ ] Nausea	[ ] Vomiting	[ ] Pruritus  		[ ] Other:    Vital Signs Last 24 Hrs  T(C): 36.9 (31 Aug 2019 08:00), Max: 38.1 (31 Aug 2019 04:00)  T(F): 98.4 (31 Aug 2019 08:00), Max: 100.5 (31 Aug 2019 04:00)  HR: 120 (31 Aug 2019 08:00) (69 - 137)  BP: 124/65 (31 Aug 2019 08:00) (112/59 - 134/68)  BP(mean): 78 (31 Aug 2019 08:00) (65 - 86)  RR: 27 (31 Aug 2019 08:00) (18 - 27)  SpO2: 95% (31 Aug 2019 08:00) (93% - 98%)    ASSESSMENT/ PLAN    Therapy to  be:	[ X] Continue   [ ] Discontinued   [ ] Change to prn Analgesics    Documentation and Verification of current medications:   [X] Done	[ ] Not done, not elligible    Comments:
Anesthesia Pain Management Service    SUBJECTIVE: Patient is doing well with IV PCA and no significant problems reported.    Pain Scale Score	At rest: ___ 	With Activity: ___ 	[X ] Refer to charted pain scores    THERAPY:    [ ] IV PCA Morphine		[ ] 5 mg/mL	[ ] 1 mg/mL  [X ] IV PCA Hydromorphone	[ ] 5 mg/mL	[X ] 1 mg/mL  [ ] IV PCA Fentanyl		[ ] 50 micrograms/mL    Demand dose __0.2_ lockout __6_ (minutes) Continuous Rate _0__ Total: __2.0mg_  Daily      MEDICATIONS  (STANDING):  dornase june Solution 2.5 milliGRAM(s) Inhalation daily  escitalopram 20 milliGRAM(s) Oral daily  famotidine    Tablet 20 milliGRAM(s) Oral every 12 hours  gabapentin 300 milliGRAM(s) Oral daily  heparin  Injectable 5000 Unit(s) SubCutaneous every 8 hours  HYDROmorphone PCA (1 mG/mL) 30 milliLiter(s) PCA Continuous PCA Continuous  lactated ringers. 1000 milliLiter(s) (30 mL/Hr) IV Continuous <Continuous>  levalbuterol Inhalation 0.63 milliGRAM(s) Inhalation every 6 hours  piperacillin/tazobactam IVPB.. 3.375 Gram(s) IV Intermittent every 8 hours  potassium chloride  10 mEq/100 mL IVPB 10 milliEquivalent(s) IV Intermittent every 1 hour  sodium chloride 3%  Inhalation 4 milliLiter(s) Inhalation every 6 hours  vancomycin  IVPB 1000 milliGRAM(s) IV Intermittent every 12 hours    MEDICATIONS  (PRN):  butorphanol Injectable 0.125 milliGRAM(s) IV Push every 6 hours PRN Pruritus  diphenhydrAMINE   Injectable 12.5 milliGRAM(s) IV Push every 4 hours PRN Pruritus  HYDROmorphone PCA (1 mG/mL) Rescue Clinician Bolus 0.5 milliGRAM(s) IV Push every 15 minutes PRN for Pain Scale GREATER THAN 6  metoclopramide Injectable 10 milliGRAM(s) IV Push once PRN Nausea and/or Vomiting  naloxone Injectable 0.1 milliGRAM(s) IV Push every 3 minutes PRN For ANY of the following changes in patient status:  A. RR LESS THAN 10 breaths per minute, B. Oxygen saturation LESS THAN 90%, C. Sedation score of 6  ondansetron Injectable 4 milliGRAM(s) IV Push every 6 hours PRN Nausea  ondansetron Injectable 4 milliGRAM(s) IV Push once PRN Nausea and/or Vomiting  senna 2 Tablet(s) Oral at bedtime PRN Constipation      OBJECTIVE:    Sedation Score:	[ X] Alert	[ ] Drowsy 	[ ] Arousable	[ ] Asleep	[ ] Unresponsive    Side Effects:	[X ] None	[ ] Nausea	[ ] Vomiting	[ ] Pruritus  		[ ] Other:    Vital Signs Last 24 Hrs  T(C): 36.9 (01 Sep 2019 04:00), Max: 38.9 (31 Aug 2019 13:00)  T(F): 98.4 (01 Sep 2019 04:00), Max: 102.1 (31 Aug 2019 13:00)  HR: 92 (01 Sep 2019 07:00) (90 - 129)  BP: 132/86 (01 Sep 2019 07:00) (116/58 - 150/61)  BP(mean): 94 (01 Sep 2019 07:00) (70 - 105)  RR: 18 (01 Sep 2019 07:00) (18 - 29)  SpO2: 99% (01 Sep 2019 07:00) (93% - 100%)    ASSESSMENT/ PLAN    Therapy to  be:	[ ] Continue   [ X] Discontinued   [X ] Change to prn Analgesics    Documentation and Verification of current medications:   [X] Done	[ ] Not done, not elligible    Comments: PRN Oral/IV opioids and/or Adjuvant medication to be ordered at this point.
Anesthesia Pain Management Service    SUBJECTIVE: Pt doing well with IV PCA without problems reported.    Therapy:	  [ X] IV PCA	   [ ] Epidural           [ ] s/p Spinal Opoid              [ ] Postpartum infusion	  [ ] Patient controlled regional anesthesia (PCRA)    [ ] prn Analgesics    Allergies    No Known Allergies    Intolerances      MEDICATIONS  (STANDING):  acetaminophen   Tablet .. 650 milliGRAM(s) Oral every 6 hours  ALBUTerol/ipratropium for Nebulization 3 milliLiter(s) Nebulizer every 6 hours  docusate sodium 100 milliGRAM(s) Oral three times a day  escitalopram 20 milliGRAM(s) Oral daily  famotidine    Tablet 20 milliGRAM(s) Oral every 12 hours  gabapentin 300 milliGRAM(s) Oral daily  heparin  Injectable 5000 Unit(s) SubCutaneous every 8 hours  HYDROmorphone PCA (1 mG/mL) 30 milliLiter(s) PCA Continuous PCA Continuous  lactated ringers. 1000 milliLiter(s) (30 mL/Hr) IV Continuous <Continuous>  senna 2 Tablet(s) Oral at bedtime    MEDICATIONS  (PRN):  butorphanol Injectable 0.125 milliGRAM(s) IV Push every 6 hours PRN Pruritus  diphenhydrAMINE   Injectable 12.5 milliGRAM(s) IV Push every 4 hours PRN Pruritus  HYDROmorphone PCA (1 mG/mL) Rescue Clinician Bolus 0.5 milliGRAM(s) IV Push every 15 minutes PRN for Pain Scale GREATER THAN 6  metoclopramide Injectable 10 milliGRAM(s) IV Push once PRN Nausea and/or Vomiting  naloxone Injectable 0.1 milliGRAM(s) IV Push every 3 minutes PRN For ANY of the following changes in patient status:  A. RR LESS THAN 10 breaths per minute, B. Oxygen saturation LESS THAN 90%, C. Sedation score of 6  ondansetron Injectable 4 milliGRAM(s) IV Push every 6 hours PRN Nausea  ondansetron Injectable 4 milliGRAM(s) IV Push once PRN Nausea and/or Vomiting      OBJECTIVE:   [X] No new signs     [ ] Other:    Side Effects:  [X ] None			[ ] Other:    Assessment of Catheter Site:		[ ] Intact		[ ] Other:    ASSESSMENT/PLAN  [ X] Continue current therapy    [ ] Therapy changed to:    [ ] IV PCA       [ ] Epidural     [ ] prn Analgesics     Comments:
Anesthesia Pain Management Service    SUBJECTIVE: Pt doing well with IV PCA without problems reported.    Therapy:	  [ X] IV PCA	   [ ] Epidural           [ ] s/p Spinal Opoid              [ ] Postpartum infusion	  [ ] Patient controlled regional anesthesia (PCRA)    [ ] prn Analgesics    Allergies    No Known Allergies    Intolerances      MEDICATIONS  (STANDING):  acetaminophen   Tablet .. 650 milliGRAM(s) Oral every 6 hours  ALBUTerol/ipratropium for Nebulization 3 milliLiter(s) Nebulizer every 6 hours  docusate sodium 100 milliGRAM(s) Oral three times a day  escitalopram 20 milliGRAM(s) Oral daily  famotidine    Tablet 20 milliGRAM(s) Oral every 12 hours  gabapentin 300 milliGRAM(s) Oral daily  heparin  Injectable 5000 Unit(s) SubCutaneous every 8 hours  HYDROmorphone PCA (1 mG/mL) 30 milliLiter(s) PCA Continuous PCA Continuous  lactated ringers. 1000 milliLiter(s) (30 mL/Hr) IV Continuous <Continuous>  senna 2 Tablet(s) Oral at bedtime    MEDICATIONS  (PRN):  butorphanol Injectable 0.125 milliGRAM(s) IV Push every 6 hours PRN Pruritus  diphenhydrAMINE   Injectable 12.5 milliGRAM(s) IV Push every 4 hours PRN Pruritus  HYDROmorphone PCA (1 mG/mL) Rescue Clinician Bolus 0.5 milliGRAM(s) IV Push every 15 minutes PRN for Pain Scale GREATER THAN 6  metoclopramide Injectable 10 milliGRAM(s) IV Push once PRN Nausea and/or Vomiting  naloxone Injectable 0.1 milliGRAM(s) IV Push every 3 minutes PRN For ANY of the following changes in patient status:  A. RR LESS THAN 10 breaths per minute, B. Oxygen saturation LESS THAN 90%, C. Sedation score of 6  ondansetron Injectable 4 milliGRAM(s) IV Push every 6 hours PRN Nausea  ondansetron Injectable 4 milliGRAM(s) IV Push once PRN Nausea and/or Vomiting      OBJECTIVE:   [X] No new signs     [ ] Other:    Side Effects:  [X ] None			[ ] Other:    Assessment of Catheter Site:		[ ] Intact		[ ] Other:    ASSESSMENT/PLAN  [ X] Continue current therapy. Recommend non-opioid adjuvant analgesics to be used when possible and when allowed by primary surgical team.    [ ] Therapy changed to:    [ ] IV PCA       [ ] Epidural     [ ] prn Analgesics     Comments:    Progress Note written now but Patient was seen earlier.
[facility]            MRN-[id number]         No Known Allergies                 HPI:  79 year old female presents to presurgical testing with diagnosis of solitary pulmonary nodule scheduled for flexible bronchoscopy, left video assisted thoracoscopy, lung resection for 8/28/19. Pt with hx of emphysema, recently started on trelegy, found a left upper lung nodule during work up for URI. Pt quit smoking in 7/2019. Pt with SOB with activity and chronic cough. (19 Aug 2019 17:23)      Procedure: Flexible bronchoscopy; L uniportal VATS SERA wedge resection w/completion SERA lobectomy and MLND  8/28/2019       Issues:  Lung nodule  Postop pain  HTN  Anxiety / Depression  COPD / Emphysema               Home Medications:  Diovan  mg-12.5 mg oral tablet: 1 tab(s) orally once a day (28 Aug 2019 13:20)  gabapentin 300 mg oral capsule: 1 cap(s) orally once a day (28 Aug 2019 13:20)  Lexapro 20 mg oral tablet: 1 tab(s) orally once a day (28 Aug 2019 13:20)  Mobic 15 mg oral tablet: 1 tab(s) orally once a day last dose on 8/21 (28 Aug 2019 13:20)  Trelegy Ellipta inhalation powder: 1 puff(s) inhaled once a day (28 Aug 2019 13:20)      PAST MEDICAL & SURGICAL HISTORY:  Anxiety and depression  Arthritis  Lower back pain  Solitary pulmonary nodule  Emphysema lung  HTN (hypertension)  No significant past surgical history        ICU Vital Signs Last 24 Hrs  T(C): 36.7 (28 Aug 2019 19:30), Max: 36.7 (28 Aug 2019 19:30)  T(F): 98 (28 Aug 2019 19:30), Max: 98 (28 Aug 2019 19:30)  HR: 80 (28 Aug 2019 20:05) (76 - 85)  BP: 136/66 (28 Aug 2019 20:05) (122/70 - 154/78)  BP(mean): 83 (28 Aug 2019 20:05) (74 - 83)  ABP: --  ABP(mean): --  RR: 18 (28 Aug 2019 20:05) (14 - 20)  SpO2: 98% (28 Aug 2019 20:05) (97% - 100%)    I&O's Detail    CAPILLARY BLOOD GLUCOSE          Home Medications:  Diovan  mg-12.5 mg oral tablet: 1 tab(s) orally once a day (28 Aug 2019 13:20)  gabapentin 300 mg oral capsule: 1 cap(s) orally once a day (28 Aug 2019 13:20)  Lexapro 20 mg oral tablet: 1 tab(s) orally once a day (28 Aug 2019 13:20)  Mobic 15 mg oral tablet: 1 tab(s) orally once a day last dose on 8/21 (28 Aug 2019 13:20)  Trelegy Ellipta inhalation powder: 1 puff(s) inhaled once a day (28 Aug 2019 13:20)      MEDICATIONS  (STANDING):  ALBUTerol/ipratropium for Nebulization 3 milliLiter(s) Nebulizer every 6 hours  docusate sodium 100 milliGRAM(s) Oral three times a day  escitalopram 20 milliGRAM(s) Oral daily  famotidine    Tablet 20 milliGRAM(s) Oral every 12 hours  gabapentin 300 milliGRAM(s) Oral daily  HYDROmorphone PCA (1 mG/mL) 30 milliLiter(s) PCA Continuous PCA Continuous  lactated ringers. 1000 milliLiter(s) (30 mL/Hr) IV Continuous <Continuous>  senna 2 Tablet(s) Oral at bedtime    MEDICATIONS  (PRN):  butorphanol Injectable 0.125 milliGRAM(s) IV Push every 6 hours PRN Pruritus  diphenhydrAMINE   Injectable 12.5 milliGRAM(s) IV Push every 4 hours PRN Pruritus  HYDROmorphone  Injectable 0.5 milliGRAM(s) IV Push every 10 minutes PRN Moderate Pain (4 - 6)  HYDROmorphone  Injectable 1 milliGRAM(s) IV Push every 10 minutes PRN Severe Pain (7 - 10)  HYDROmorphone PCA (1 mG/mL) Rescue Clinician Bolus 0.5 milliGRAM(s) IV Push every 15 minutes PRN for Pain Scale GREATER THAN 6  metoclopramide Injectable 10 milliGRAM(s) IV Push once PRN Nausea and/or Vomiting  naloxone Injectable 0.1 milliGRAM(s) IV Push every 3 minutes PRN For ANY of the following changes in patient status:  A. RR LESS THAN 10 breaths per minute, B. Oxygen saturation LESS THAN 90%, C. Sedation score of 6  ondansetron Injectable 4 milliGRAM(s) IV Push every 6 hours PRN Nausea  ondansetron Injectable 4 milliGRAM(s) IV Push once PRN Nausea and/or Vomiting          Physical exam:                             General:               Pt is awake, alert, appears to be in pain but not in distress                                                 Neuro:                  Nonfocal                             Cardiovascular:   S1 & S2, regular                           Respiratory:         Air entry is fair and equal on both sides, has bilateral conducted sounds                           GI:                          Soft, nondistended and nontender, Bowel sounds active                            Ext:                        No cyanosis or edema     Labs:                                                                 CXR:  Postop changes, left pneumothorax, left chest tube in place      Plan:    General: 79yFemale s/p Flexible bronchoscopy; L uniportal VATS SERA wedge resection w/completion SERA lobectomy and MLND  8/28/2019 , experiencing  pain with deep breathing.                             Neuro:                                         Pain control with PCA / Tylenol IV    Anxiety / Depression: Continue Lexapro                            Cardiovascular:                                          HTN: Continue hemodynamic monitoring, restart Diovan                             Respiratory:                                         Pt is on 2L  nasal canula, wean off as tolerated                                          Comfortable, not in any distress.                                         Using incentive spirometry                                          Monitor chest tube output                                         Left pneumothorax: Chest tube to suction, follow AM CXR                                                                  COPD: Continue bronchodilators, pulmonary toilet                            GI                                         On clear liquids, advance to regular diet as tolerated                                         Continue Zofran / Reglan for nausea - PRN	                                                                 Renal:                                         Continue LR 30cc/hr                                         Monitor I/Os and electrolytes                                                                                        Hem/ Onc:                                                                                  Monitor chest tube output &  signs of bleeding.                                          Follow CBC in AM                           Infectious disease:                                            No signs of infection. Monitor for fever / leukocytosis.                                          All surgical incision / chest tube  sites look clean                            Endocrine                                             Continue Accu-Checks with coverage    Pt is on SQ Heparin and Venodyne boots for DVT prophylaxis.     Pertinent clinical, laboratory, radiographic, hemodynamic, echocardiographic, respiratory data, microbiologic data and chart were reviewed and analyzed frequently throughout the course of the day and night  Patient seen, examined and plan discussed with CT Surgeon  / CTICU team during rounds.    Pt's status discussed with family at bedside, updated status          Charles Tobias MD
[facility]            MRN-[id number]         No Known Allergies               79 year old female presents to presurgical testing with diagnosis of solitary pulmonary nodule scheduled for flexible bronchoscopy, left video assisted thoracoscopy, lung resection for 8/28/19. Pt with hx of emphysema, recently started on trelegy, found a left upper lung nodule during work up for URI. Pt quit smoking in 7/2019. Pt with SOB with activity and chronic cough. (19 Aug 2019 17:23)      Procedure: Flexible bronchoscopy; L uniportal VATS SERA wedge resection w/completion SERA lobectomy and MLND  8/28/2019       Issues:  Lung nodule  Postop pain  HTN  Hypokalemia   Anxiety / Depression             COPD / Emphysema                 Home Medications:  Diovan  mg-12.5 mg oral tablet: 1 tab(s) orally once a day (28 Aug 2019 13:20)  gabapentin 300 mg oral capsule: 1 cap(s) orally once a day (28 Aug 2019 13:20)  Lexapro 20 mg oral tablet: 1 tab(s) orally once a day (28 Aug 2019 13:20)  Mobic 15 mg oral tablet: 1 tab(s) orally once a day last dose on 8/21 (28 Aug 2019 13:20)  Trelegy Ellipta inhalation powder: 1 puff(s) inhaled once a day (28 Aug 2019 13:20)      PAST MEDICAL & SURGICAL HISTORY:  Anxiety and depression  Arthritis  Lower back pain  Solitary pulmonary nodule  Emphysema lung  HTN (hypertension)  No significant past surgical history        ICU Vital Signs Last 24 Hrs  T(C): 36.9 (01 Sep 2019 04:00), Max: 38.9 (31 Aug 2019 13:00)  T(F): 98.4 (01 Sep 2019 04:00), Max: 102.1 (31 Aug 2019 13:00)  HR: 101 (01 Sep 2019 06:00) (90 - 137)  BP: 144/68 (01 Sep 2019 06:00) (116/58 - 150/61)  BP(mean): 86 (01 Sep 2019 06:00) (70 - 105)  ABP: --  ABP(mean): --  RR: 21 (01 Sep 2019 06:00) (20 - 29)  SpO2: 97% (01 Sep 2019 06:00) (93% - 100%)    I&O's Detail    30 Aug 2019 07:01  -  31 Aug 2019 07:00  --------------------------------------------------------  IN:    IV PiggyBack: 750 mL    lactated ringers.: 510 mL    Oral Fluid: 480 mL  Total IN: 1740 mL    OUT:    Chest Tube: 340 mL    Voided: 900 mL  Total OUT: 1240 mL    Total NET: 500 mL      31 Aug 2019 07:01  -  01 Sep 2019 06:52  --------------------------------------------------------  IN:    IV PiggyBack: 1200 mL    lactated ringers.: 250 mL    lactated ringers.: 720 mL    Oral Fluid: 50 mL  Total IN: 2220 mL    OUT:    Chest Tube: 380 mL    Voided: 1675 mL  Total OUT: 2055 mL    Total NET: 165 mL        CAPILLARY BLOOD GLUCOSE      POCT Blood Glucose.: 137 mg/dL (31 Aug 2019 17:10)      Home Medications:  Diovan  mg-12.5 mg oral tablet: 1 tab(s) orally once a day (28 Aug 2019 13:20)  gabapentin 300 mg oral capsule: 1 cap(s) orally once a day (28 Aug 2019 13:20)  Lexapro 20 mg oral tablet: 1 tab(s) orally once a day (28 Aug 2019 13:20)  Mobic 15 mg oral tablet: 1 tab(s) orally once a day last dose on 8/21 (28 Aug 2019 13:20)  Trelegy Ellipta inhalation powder: 1 puff(s) inhaled once a day (28 Aug 2019 13:20)      MEDICATIONS  (STANDING):  dornase june Solution 2.5 milliGRAM(s) Inhalation daily  escitalopram 20 milliGRAM(s) Oral daily  famotidine    Tablet 20 milliGRAM(s) Oral every 12 hours  gabapentin 300 milliGRAM(s) Oral daily  heparin  Injectable 5000 Unit(s) SubCutaneous every 8 hours  HYDROmorphone PCA (1 mG/mL) 30 milliLiter(s) PCA Continuous PCA Continuous  lactated ringers. 1000 milliLiter(s) (30 mL/Hr) IV Continuous <Continuous>  levalbuterol Inhalation 0.63 milliGRAM(s) Inhalation every 6 hours  piperacillin/tazobactam IVPB.. 3.375 Gram(s) IV Intermittent every 8 hours  potassium chloride  10 mEq/100 mL IVPB 10 milliEquivalent(s) IV Intermittent every 1 hour  sodium chloride 3%  Inhalation 4 milliLiter(s) Inhalation every 6 hours  vancomycin  IVPB 1000 milliGRAM(s) IV Intermittent every 12 hours    MEDICATIONS  (PRN):  butorphanol Injectable 0.125 milliGRAM(s) IV Push every 6 hours PRN Pruritus  diphenhydrAMINE   Injectable 12.5 milliGRAM(s) IV Push every 4 hours PRN Pruritus  HYDROmorphone PCA (1 mG/mL) Rescue Clinician Bolus 0.5 milliGRAM(s) IV Push every 15 minutes PRN for Pain Scale GREATER THAN 6  metoclopramide Injectable 10 milliGRAM(s) IV Push once PRN Nausea and/or Vomiting  naloxone Injectable 0.1 milliGRAM(s) IV Push every 3 minutes PRN For ANY of the following changes in patient status:  A. RR LESS THAN 10 breaths per minute, B. Oxygen saturation LESS THAN 90%, C. Sedation score of 6  ondansetron Injectable 4 milliGRAM(s) IV Push every 6 hours PRN Nausea  ondansetron Injectable 4 milliGRAM(s) IV Push once PRN Nausea and/or Vomiting  senna 2 Tablet(s) Oral at bedtime PRN Constipation          Physical exam:                             General:               Pt is awake, alert, appears to be in pain but not in distress                                                 Neuro:                  Nonfocal                             Cardiovascular:   S1 & S2, regular                           Respiratory:         Air entry is fair and equal on both sides, has bilateral conducted sounds                           GI:                          Soft, nondistended and nontender, Bowel sounds active                            Ext:                        No cyanosis or edema            Labs:                                                                           10.0   22.79 )-----------( 349      ( 01 Sep 2019 05:15 )             29.2             09-01    141  |  103  |  6<L>  ----------------------------<  125<H>  2.8<LL>   |  27  |  0.55    Ca    9.2      01 Sep 2019 05:15  Phos  2.7     09-01  Mg     2.1     09-01                            Culture - Respiratory with Gram Stain (collected 08-31-19 @ 17:03)  Source: SPUTUM        CXR:    Left lung is open compared to yesterday's cxr.       Plan:    General:   79yFemale s/p Flexible bronchoscopy; L uniportal VATS SERA wedge resection w/completion SERA lobectomy and MLND  8/28/2019 , experiencing  pain with deep breathing.                             Neuro:                                         Pain control with  Tylenol IV    Anxiety / Depression: Continue Lexapro                            Cardiovascular:                                          HTN: Restart Diovan                             Respiratory:                                         Pt is on 2L  nasal canula, wean off as tolerated                                          Left lower lobe atelectassi - open on this AM CXR and more Comfortable today, not in any distress.                                         Using incentive spirometry                                          Monitor chest tube output- has air leak                                                               COPD: Continue bronchodilators, pulmonary toilet                            GI                                         On regular diet as tolerated                                         Continue Zofran / Reglan for nausea - PRN	                                                                 Renal:                                         Continue LR 30cc/hr                                         Hypokalemia - secondary to diarrhea. supplemented this morning                                         Monitor I/Os and electrolytes.                                                                                         Hem/ Onc:                                                                                  Monitor chest tube output &  signs of bleeding.                                          Follow CBC in AM                           Infectious disease:                                            Febrile with leukocytosis, GPC in pairs in sputum - Continue Vanco /Zosyn. Monitor Vanco level                                          All surgical incision / chest tube  sites look clean                            Endocrine                                             Continue Accu-Checks with coverage    Pt is on SQ Heparin and Venodyne boots for DVT prophylaxis.     Pertinent clinical, laboratory, radiographic, hemodynamic, echocardiographic, respiratory data, microbiologic data and chart were reviewed and analyzed frequently throughout the course of the day and night  Patient seen, examined and plan discussed with CT Surgeon  Dr. Brooks / CTICU team during rounds.    Pt's status discussed with family at bedside, updated status          Charles Tobias MD
[facility]            MRN-[id number]         No Known Allergies           Daily Height in cm: 157.48 (28 Aug 2019 12:27)    Daily Weight in k.5 (29 Aug 2019 05:00)    HPI:    79 year old female presents to presurgical testing with diagnosis of solitary pulmonary nodule scheduled for flexible bronchoscopy, left video assisted thoracoscopy, lung resection for 19. Pt with hx of emphysema, recently started on trelegy, found a left upper lung nodule during work up for URI. Pt quit smoking in 2019. Pt with SOB with activity and chronic cough. (19 Aug 2019 17:23)      Procedure: Flexible bronchoscopy; L uniportal VATS SERA wedge resection w/completion SERA lobectomy and MLND  2019       Issues:  Lung nodule  Postop pain  HTN  Anxiety / Depression             COPD / Emphysema      ICU Vital Signs Last 24 Hrs  T(C): 37 (29 Aug 2019 04:00), Max: 37 (29 Aug 2019 04:00)  T(F): 98.6 (29 Aug 2019 04:00), Max: 98.6 (29 Aug 2019 04:00)  HR: 87 (29 Aug 2019 05:00) (76 - 89)  BP: 113/56 (29 Aug 2019 05:00) (113/56 - 154/78)  BP(mean): 71 (29 Aug 2019 05:00) (71 - 85)  ABP: --  ABP(mean): --  RR: 18 (29 Aug 2019 05:00) (14 - 29)  SpO2: 98% (29 Aug 2019 05:00) (96% - 100%)      I&O's Summary    28 Aug 2019 07:01  -  29 Aug 2019 05:49  --------------------------------------------------------  IN: 540 mL / OUT: 200 mL / NET: 340 mL      CAPILLARY BLOOD GLUCOSE            MEDICATIONS  (STANDING):  ALBUTerol/ipratropium for Nebulization 3 milliLiter(s) Nebulizer every 6 hours  docusate sodium 100 milliGRAM(s) Oral three times a day  escitalopram 20 milliGRAM(s) Oral daily  famotidine    Tablet 20 milliGRAM(s) Oral every 12 hours  gabapentin 300 milliGRAM(s) Oral daily  HYDROmorphone PCA (1 mG/mL) 30 milliLiter(s) PCA Continuous PCA Continuous  lactated ringers. 1000 milliLiter(s) (30 mL/Hr) IV Continuous <Continuous>  senna 2 Tablet(s) Oral at bedtime    MEDICATIONS  (PRN):  butorphanol Injectable 0.125 milliGRAM(s) IV Push every 6 hours PRN Pruritus  diphenhydrAMINE   Injectable 12.5 milliGRAM(s) IV Push every 4 hours PRN Pruritus  HYDROmorphone  Injectable 0.5 milliGRAM(s) IV Push every 10 minutes PRN Moderate Pain (4 - 6)  HYDROmorphone  Injectable 1 milliGRAM(s) IV Push every 10 minutes PRN Severe Pain (7 - 10)  HYDROmorphone PCA (1 mG/mL) Rescue Clinician Bolus 0.5 milliGRAM(s) IV Push every 15 minutes PRN for Pain Scale GREATER THAN 6  metoclopramide Injectable 10 milliGRAM(s) IV Push once PRN Nausea and/or Vomiting  naloxone Injectable 0.1 milliGRAM(s) IV Push every 3 minutes PRN For ANY of the following changes in patient status:  A. RR LESS THAN 10 breaths per minute, B. Oxygen saturation LESS THAN 90%, C. Sedation score of 6  ondansetron Injectable 4 milliGRAM(s) IV Push every 6 hours PRN Nausea  ondansetron Injectable 4 milliGRAM(s) IV Push once PRN Nausea and/or Vomiting          Physical exam:                             General:               Pt is awake, alert, appears to be in pain but not in distress                                                 Neuro:                  Nonfocal                             Cardiovascular:   S1 & S2, regular                           Respiratory:         Air entry is fair and equal on both sides, has bilateral conducted sounds                           GI:                          Soft, nondistended and nontender, Bowel sounds active                            Ext:                        No cyanosis or edema       Labs:                                                                         10.1   21.04 )-----------( 391      ( 29 Aug 2019 04:15 )             30.3                              138  |  103  |  17  ----------------------------<  193<H>  4.1   |  20<L>  |  0.65    Ca    9.3      29 Aug 2019 04:15  Phos  3.8       Mg     2.1                                                                                            Plan:  CXR:  Postop changes, left pneumothorax, left chest tube in place      Plan:    General: 79yFemale s/p Flexible bronchoscopy; L uniportal VATS SERA wedge resection w/completion SERA lobectomy and MLND  2019 , experiencing  pain with deep breathing.                             Neuro:                                         Pain control with PCA / Tylenol IV    Anxiety / Depression: Continue Lexapro                            Cardiovascular:                                          HTN: Continue hemodynamic monitoring, restart Diovan                             Respiratory:                                         Pt is on 2L  nasal canula, wean off as tolerated                                          Comfortable, not in any distress.                                         Using incentive spirometry                                          Monitor chest tube output                                         Left pneumothorax: Chest tube to suction, follow AM CXR                                                                  COPD: Continue bronchodilators, pulmonary toilet                            GI                                         On clear liquids, advance to regular diet as tolerated                                         Continue Zofran / Reglan for nausea - PRN	                                                                 Renal:                                         Continue LR 30cc/hr                                         Monitor I/Os and electrolytes.                                                                                         Hem/ Onc:                                                                                  Monitor chest tube output &  signs of bleeding.                                          Follow CBC in AM                           Infectious disease:                                            No signs of infection. Monitor for fever / leukocytosis.                                          All surgical incision / chest tube  sites look clean                            Endocrine                                             Continue Accu-Checks with coverage    Pt is on SQ Heparin and Venodyne boots for DVT prophylaxis.     Pertinent clinical, laboratory, radiographic, hemodynamic, echocardiographic, respiratory data, microbiologic data and chart were reviewed and analyzed frequently throughout the course of the day and night  Patient seen, examined and plan discussed with CT Surgeon  / CTICU team during rounds.    Pt's status discussed with family at bedside, updated status                                              Charles Tobias MD
[facility]  MRN-[id number]  _________________________  VITAL SIGNS:  Vital Signs Last 24 Hrs  T(C): 37.1 (02 Sep 2019 04:00), Max: 37.1 (01 Sep 2019 20:00)  T(F): 98.8 (02 Sep 2019 04:00), Max: 98.8 (01 Sep 2019 20:00)  HR: 100 (02 Sep 2019 05:00) (92 - 117)  BP: 118/57 (02 Sep 2019 05:00) (98/79 - 146/61)  BP(mean): 71 (02 Sep 2019 05:00) (64 - 94)  RR: 27 (02 Sep 2019 05:00) (18 - 29)  SpO2: 97% (02 Sep 2019 05:00) (93% - 100%)  I/Os:   I&O's Detail    31 Aug 2019 07:01  -  01 Sep 2019 07:00  --------------------------------------------------------  IN:    IV PiggyBack: 1200 mL    lactated ringers.: 720 mL    lactated ringers.: 250 mL    Oral Fluid: 50 mL  Total IN: 2220 mL    OUT:    Chest Tube: 380 mL    Voided: 1675 mL  Total OUT: 2055 mL    Total NET: 165 mL      01 Sep 2019 07:01  -  02 Sep 2019 05:50  --------------------------------------------------------  IN:    IV PiggyBack: 450 mL    Lactated Ringers IV Bolus: 500 mL    lactated ringers.: 630 mL    Oral Fluid: 240 mL  Total IN: 1820 mL    OUT:    Chest Tube: 155 mL    Voided: 1200 mL  Total OUT: 1355 mL    Total NET: 465 mL              MEDICATIONS:  MEDICATIONS  (STANDING):  chlorhexidine 4% Liquid 1 Application(s) Topical every 24 hours  dornase june Solution 2.5 milliGRAM(s) Inhalation daily  escitalopram 20 milliGRAM(s) Oral daily  famotidine    Tablet 20 milliGRAM(s) Oral every 12 hours  gabapentin 300 milliGRAM(s) Oral daily  heparin  Injectable 5000 Unit(s) SubCutaneous every 8 hours  lactated ringers Bolus 500 milliLiter(s) IV Bolus once  lactated ringers. 1000 milliLiter(s) (30 mL/Hr) IV Continuous <Continuous>  levalbuterol Inhalation 0.63 milliGRAM(s) Inhalation every 6 hours  magnesium sulfate  IVPB 1 Gram(s) IV Intermittent once  piperacillin/tazobactam IVPB.. 3.375 Gram(s) IV Intermittent every 8 hours  potassium chloride    Tablet ER 40 milliEquivalent(s) Oral once  sodium chloride 3%  Inhalation 4 milliLiter(s) Inhalation every 6 hours  vancomycin  IVPB 1000 milliGRAM(s) IV Intermittent every 12 hours    MEDICATIONS  (PRN):  ondansetron Injectable 4 milliGRAM(s) IV Push once PRN Nausea and/or Vomiting  oxyCODONE    IR 5 milliGRAM(s) Oral every 4 hours PRN Moderate Pain (4 - 6)  oxyCODONE    IR 10 milliGRAM(s) Oral every 4 hours PRN Severe Pain (7 - 10)      LABS:                        8.6    18.52 )-----------( 330      ( 02 Sep 2019 03:10 )             25.7     09-02    138  |  104  |  16  ----------------------------<  113<H>  3.7   |  23  |  1.40<H>    Ca    8.8      02 Sep 2019 03:10  Phos  2.7     09-01  Mg     1.9     09-02              _________________________        PROCEDURE: left video assisted thoracoscopy, left upper lobectomy on 8/28/19      ISSUES:   RAUL  Diarrhea  Pneumothorax  Atelectasis of L lung  Chest tube in place  solitary pulmonary nodule  COPD  Anxiety and depression  HTN  Post op pain    INTERVAL EVENTS:   Bolused LR 1L yesterday. Repleted K.    HISTORY:   Patient reports mild pain at chest wall incision sites which is worse with coughing and deep breathing with associated fever. No associated dyspnea. Pain is improved with use of pain meds.   Diarrhea yesterday with 6 BMs. No abdominal pain or fever. Eating and drinking without issues.  No dysuria.    PHYSICAL EXAM:   Gen: Comfortable, No acute distress  Eyes: Sclera white, Conjunctiva normal, Eyelids normal, Pupils symmetrical   ENT: Mucous membranes moist,  ,  ,    Neck: Trachea midline,  ,  ,  ,  ,    CV: Rate regular, Rhythm regular  Resp: Breath sounds mild wheezing, No accessory muscles use, L chest tube in place, No air leak  Abd: Soft, Non-distended, Non-tender, Bowel sounds normal,   Skin: Warm, No peripheral edema of lower extremities  : No ho  Neuro: Moving all 4 extremities  Psych: A&Ox3      ASSESSMENT AND PLAN:     NEURO:  Post-operative Pain - Pain control with oxycodone PO   Psych - continue psych meds      RESPIRATORY:  Hypoxia - Wean nasal cannula for goal O2sat above 92. Obtain CXR. Incentive spirometry. Chest PT and frequent suctioning. Continue bronchodilators. OOB to chair & ambulate w/ assistance. Continuous pulse oximetry for support & to prevent decompensation.    Chest tube for Pneumothorax – Pleurevac regulated suctioning. Monitor chest tube output.  Water seal trial today     L lung atelectasis s/p bronchoscopy - chest PT. nebs. pulmyzyme nebs.    COPD - stable. Continue home inhalers.      CARDIOVASCULAR:  Hemodynamically stable - Not on pressors. Continue hemodynamic monitoring.              RENAL:  RAUL – Stable. IVF Bolus as needed. LR IVF 30mL/hr. Monitor IOs and electrolytes.    Urine lytes.  Renally dose medications.  Hypokalemia - replete K      GASTROINTESTINAL:  Diarrhea - no signs of infectious diarrhea. possibly secondary to antibiotics. Will continue to monitor.  GI prophylaxis not indicated  Zofran and Reglan IV PRN for nausea  Regular consistency diet      HEMATOLOGIC:  No signs of active bleeding. Monitor Hgb in CBC in AM  DVT prophylaxis with heparin subQ and SCDs.      INFECTIOUS DISEASE:  L PNA - start vanc and zosyn. Monitor vancomycin levels.  All surgical sites appear clean.                ENDOCRINE:  Stable – Monitor glucose fingersticks for goal 120-180.             Pertinent clinical, laboratory, radiographic, hemodynamic, echocardiographic, respiratory data, microbiologic data and chart were reviewed by myself and analyzed frequently throughout the course of the day and night by myself.    Plan discussed at length with the CTICU staff and Attending CT Surgeon.   Patient's status was discussed with patient at bedside.
[facility]  MRN-[id number]  _________________________  VITAL SIGNS:  Vital Signs Last 24 Hrs  T(C): 37.6 (31 Aug 2019 05:00), Max: 38.1 (31 Aug 2019 04:00)  T(F): 99.6 (31 Aug 2019 05:00), Max: 100.5 (31 Aug 2019 04:00)  HR: 120 (31 Aug 2019 04:21) (69 - 121)  BP: 127/65 (31 Aug 2019 04:00) (112/59 - 139/66)  BP(mean): 80 (31 Aug 2019 04:00) (65 - 86)  RR: 25 (31 Aug 2019 04:00) (18 - 25)  SpO2: 98% (31 Aug 2019 04:21) (93% - 98%)  I/Os:   I&O's Detail    30 Aug 2019 07:01  -  31 Aug 2019 07:00  --------------------------------------------------------  IN:    IV PiggyBack: 750 mL    lactated ringers.: 510 mL    Oral Fluid: 480 mL  Total IN: 1740 mL    OUT:    Chest Tube: 240 mL    Voided: 900 mL  Total OUT: 1140 mL    Total NET: 600 mL              MEDICATIONS:  MEDICATIONS  (STANDING):  ALBUTerol/ipratropium for Nebulization 3 milliLiter(s) Nebulizer every 6 hours  docusate sodium 100 milliGRAM(s) Oral three times a day  escitalopram 20 milliGRAM(s) Oral daily  famotidine    Tablet 20 milliGRAM(s) Oral every 12 hours  gabapentin 300 milliGRAM(s) Oral daily  heparin  Injectable 5000 Unit(s) SubCutaneous every 8 hours  HYDROmorphone PCA (1 mG/mL) 30 milliLiter(s) PCA Continuous PCA Continuous  lactated ringers. 1000 milliLiter(s) (30 mL/Hr) IV Continuous <Continuous>  piperacillin/tazobactam IVPB. 3.375 Gram(s) IV Intermittent once  piperacillin/tazobactam IVPB.. 3.375 Gram(s) IV Intermittent every 8 hours  senna 2 Tablet(s) Oral at bedtime  vancomycin  IVPB 1000 milliGRAM(s) IV Intermittent every 12 hours    MEDICATIONS  (PRN):  butorphanol Injectable 0.125 milliGRAM(s) IV Push every 6 hours PRN Pruritus  diphenhydrAMINE   Injectable 12.5 milliGRAM(s) IV Push every 4 hours PRN Pruritus  HYDROmorphone PCA (1 mG/mL) Rescue Clinician Bolus 0.5 milliGRAM(s) IV Push every 15 minutes PRN for Pain Scale GREATER THAN 6  metoclopramide Injectable 10 milliGRAM(s) IV Push once PRN Nausea and/or Vomiting  naloxone Injectable 0.1 milliGRAM(s) IV Push every 3 minutes PRN For ANY of the following changes in patient status:  A. RR LESS THAN 10 breaths per minute, B. Oxygen saturation LESS THAN 90%, C. Sedation score of 6  ondansetron Injectable 4 milliGRAM(s) IV Push every 6 hours PRN Nausea  ondansetron Injectable 4 milliGRAM(s) IV Push once PRN Nausea and/or Vomiting      LABS:                        8.9    21.59 )-----------( 313      ( 31 Aug 2019 04:25 )             26.9     08-31    135  |  98  |  8   ----------------------------<  127<H>  3.9   |  21<L>  |  0.46<L>    Ca    8.6      31 Aug 2019 04:25  Phos  2.7     08-31  Mg     1.7     08-31              _________________________    PROCEDURE: left video assisted thoracoscopy, left upper lobectomy on 8/28/19      ISSUES:   Pneumothorax  Atelectasis of L lung  Chest tube in place  solitary pulmonary nodule  COPD  Anxiety and depression  HTN  Post op pain    INTERVAL EVENTS:   Spiked fever. CXR with collapsed L lung.    HISTORY:   Patient reports moderate pain at chest wall incision sites which is worse with coughing and deep breathing with associated fever. No associated dyspnea. Pain is improved with use of pain meds.     PHYSICAL EXAM:   Gen: Comfortable, No acute distress  Eyes: Sclera white, Conjunctiva normal, Eyelids normal, Pupils symmetrical   ENT: Mucous membranes moist,  ,  ,    Neck: Trachea midline,  ,  ,  ,  ,    CV: Rate regular, Rhythm regular  Resp: Breath sounds decreased on the left, No accessory muscles use, L chest tube in place, Air leak present  Abd: Soft, Non-distended, Non-tender, Bowel sounds normal,  ,    Skin: Warm, No peripheral edema of lower extremities  : No ho  Neuro: Moving all 4 extremities  Psych: A&Ox3      ASSESSMENT AND PLAN:     NEURO:  Post-operative Pain - Pain control with PCA and Tylenol IV PRN.     Psych - continue psych meds      RESPIRATORY:  Hypoxia - Wean nasal cannula for goal O2sat above 92. Obtain CXR. Incentive spirometry. Chest PT and frequent suctioning. Continue bronchodilators. OOB to chair & ambulate w/ assistance. Continuous pulse oximetry for support & to prevent decompensation.    Chest tube for Pneumothorax – Pleurevac regulated suctioning. Monitor chest tube output.     L lung atelectasis - chest PT. Possible bronchoscopy.    COPD - stable. Continue home inhalers.      CARDIOVASCULAR:  Hemodynamically stable - Not on pressors. Continue hemodynamic monitoring.              RENAL:  Stable – LR IVF 30mL/hr. Monitor IOs and electrolytes.          GASTROINTESTINAL:  GI prophylaxis not indicated  Zofran and Reglan IV PRN for nausea  NPO      HEMATOLOGIC:  No signs of active bleeding. Monitor Hgb in CBC in AM  DVT prophylaxis with heparin subQ and SCDs.      INFECTIOUS DISEASE:  L PNA - start vanc and zosyn. Monitor vancomycin levels.  All surgical sites appear clean.                ENDOCRINE:  Stable – Monitor glucose fingersticks for goal 120-180.                 Pertinent clinical, laboratory, radiographic, hemodynamic, echocardiographic, respiratory data, microbiologic data and chart were reviewed by myself and analyzed frequently throughout the course of the day and night by myself.    Plan discussed at length with the CTICU staff and Attending CT Surgeon.   Patient's status was discussed with patient at bedside.
FLAKO STUBBS  [id number]    Patient is a 79y old  Female who presents with a chief complaint of solitary pulmonary nodule (19 Aug 2019 17:23)    HPI:  79 year old female with a history of emphysema, prior smoking (quit 2019), hypertension, anxiety/depression was found to have a solitary pulmonary nodule is s/p flexible bronchoscopy, left video assisted thoracoscopy, left upper lobectomy on 19. Per history, the patient has emphysema and recently started on Trelegy. During a work-up for an upper respiratory tract infection a left upper lobe nodule was found incidentally. She endorses dyspnea with activity and chronic cough. She has no complaints.     PAST MEDICAL & SURGICAL HISTORY:  Anxiety and depression  Arthritis  Lower back pain  Solitary pulmonary nodule  Emphysema lung  HTN (hypertension)  No significant past surgical history    FAMILY HISTORY:  Unremarkable    Social History:  Denies illicit drug use or frequent alcohol consumption. Endorses prior smoking.     Allergies  No Known Allergies    MEDICATIONS  (STANDING):  acetaminophen   Tablet .. 650 milliGRAM(s) Oral every 6 hours  ALBUTerol/ipratropium for Nebulization 3 milliLiter(s) Nebulizer every 6 hours  docusate sodium 100 milliGRAM(s) Oral three times a day  escitalopram 20 milliGRAM(s) Oral daily  famotidine    Tablet 20 milliGRAM(s) Oral every 12 hours  gabapentin 300 milliGRAM(s) Oral daily  heparin  Injectable 5000 Unit(s) SubCutaneous every 8 hours  HYDROmorphone PCA (1 mG/mL) 30 milliLiter(s) PCA Continuous PCA Continuous  lactated ringers. 1000 milliLiter(s) (30 mL/Hr) IV Continuous <Continuous>  senna 2 Tablet(s) Oral at bedtime  sodium phosphate IVPB 15 milliMole(s) IV Intermittent once    MEDICATIONS  (PRN):  butorphanol Injectable 0.125 milliGRAM(s) IV Push every 6 hours PRN Pruritus  diphenhydrAMINE   Injectable 12.5 milliGRAM(s) IV Push every 4 hours PRN Pruritus  HYDROmorphone PCA (1 mG/mL) Rescue Clinician Bolus 0.5 milliGRAM(s) IV Push every 15 minutes PRN for Pain Scale GREATER THAN 6  metoclopramide Injectable 10 milliGRAM(s) IV Push once PRN Nausea and/or Vomiting  naloxone Injectable 0.1 milliGRAM(s) IV Push every 3 minutes PRN For ANY of the following changes in patient status:  A. RR LESS THAN 10 breaths per minute, B. Oxygen saturation LESS THAN 90%, C. Sedation score of 6  ondansetron Injectable 4 milliGRAM(s) IV Push every 6 hours PRN Nausea  ondansetron Injectable 4 milliGRAM(s) IV Push once PRN Nausea and/or Vomiting    Review of Systems:  Constitutional:  Negative for weight change, fever, malaise  HEENT:  Negative for sinus pain, hoarseness, sore throat, dysphagia, vision changes  Cardiovascular:  Negative for chest pain, palpitations, dizziness  Respiratory:  Negative for cough, wheezing, dyspnea  Gastrointestinal:  Negative for nausea, vomiting, diarrhea, melena  Musculoskeletal:  Negative for pain, swelling, stiffness   Neuro:  Negative for weakness, numbness, headache  Psych:  Negative for anxiety, depression  Endocrine:  Negative for polyuria, polydipsia, temperature Intolerance    All other systems negative unless otherwise stated    ICU Vital Signs Last 24 Hrs  T(C): 36.8 (30 Aug 2019 04:00), Max: 36.9 (29 Aug 2019 20:00)  T(F): 98.2 (30 Aug 2019 04:00), Max: 98.4 (29 Aug 2019 20:00)  HR: 93 (30 Aug 2019 06:00) (70 - 109)  BP: 134/63 (30 Aug 2019 06:00) (100/53 - 160/65)  BP(mean): 79 (30 Aug 2019 06:00) (62 - 102)  ABP: --  ABP(mean): --  RR: 16 (30 Aug 2019 06:00) (12 - 26)  SpO2: 92% (30 Aug 2019 06:00) (92% - 100%)    Daily     Daily Weight in k.2 (30 Aug 2019 03:00)  I&O's Summary    28 Aug 2019 07:  -  29 Aug 2019 07:00  --------------------------------------------------------  IN: 570 mL / OUT: 210 mL / NET: 360 mL    29 Aug 2019 07:01  -  30 Aug 2019 06:45  --------------------------------------------------------  IN: 1170 mL / OUT: 1940 mL / NET: -770 mL    Physical Exam:  Gen: Alert, no apparent distress  CV: Regular rate and rhythm no murmurs, rubs or gallops  Pulm: Clear to auscultation bilaterally, no rales, rhonchi or wheezes  Chest: Chest tubes/drains in place with dressings clean, dry and intact  GI: Abd is soft, non-tender and non-distended with +BS  Ext: No clubbing, cyanosis or edema  Neuro: A+Ox3, follows commands and moves all extremities    Labs:                          9.2    19.04 )-----------( 377      ( 30 Aug 2019 03:30 )             27.6       08-30    138  |  104  |  12  ----------------------------<  130<H>  4.5   |  23  |  0.69    Ca    9.1      30 Aug 2019 03:20  Phos  2.2     08-30  Mg     2.2     08-30    Assessment/Plan: 79 year old female with a history of emphysema, prior smoking (quit 2019), hypertension, anxiety/depression was found to have a solitary pulmonary nodule is s/p flexible bronchoscopy, left video assisted thoracoscopy, left upper lobectomy on 19.    Neuro:   Pain control with PCA / Tylenol IV   Continue Gabapentin  Continue Citalopram                                          Cardiovascular:    Stable hemodynamics  Not on any pressors  Continue hemodynamic monitoring    Respiratory:  Pt is comfortable on nasal cannula  Encourage incentive spirometry  Continue nebulizers  Monitor chest tube output  Chest tube to suction	    GI:  Tolerating regular diet  Continue bowel regimen, Pepcid  Continue Zofran for nausea - PRN	          Renal:  Continue LR 30CC/hr        Monitor I/Os and electrolytes    Hematologic / Oncology:  No signs of active bleeding, H/H stable  Monitor chest tube output    HSQ for DVT ppl    Infectious disease:  All surgical incision / chest tube sites look clean  No fever or other signs of infection     Endocrine:  Continue Accuchecks with coverage    All clinical, lab, hemodynamic and radiographic data were reviewed and the plan was discussed with CTICU team.     Ariel Hannah MD
Follow Up:      Inverval History/ROS:Patient is a 79y old  Female who presents with a chief complaint of Left Upper Lobectomy (03 Sep 2019 06:42)    Afebrile    Feels comfortable    No diarrhea    Allergies    No Known Allergies    Intolerances        ANTIMICROBIALS:  piperacillin/tazobactam IVPB.. 3.375 every 8 hours      OTHER MEDS:  chlorhexidine 4% Liquid 1 Application(s) Topical every 24 hours  dornase june Solution 2.5 milliGRAM(s) Inhalation daily  escitalopram 20 milliGRAM(s) Oral daily  famotidine    Tablet 20 milliGRAM(s) Oral every 12 hours  gabapentin 300 milliGRAM(s) Oral daily  heparin  Injectable 5000 Unit(s) SubCutaneous every 8 hours  lactated ringers. 1000 milliLiter(s) IV Continuous <Continuous>  levalbuterol Inhalation 0.63 milliGRAM(s) Inhalation every 6 hours  ondansetron Injectable 4 milliGRAM(s) IV Push once PRN  oxyCODONE    IR 5 milliGRAM(s) Oral every 4 hours PRN  oxyCODONE    IR 10 milliGRAM(s) Oral every 4 hours PRN      Vital Signs Last 24 Hrs  T(C): 36.8 (03 Sep 2019 08:00), Max: 37.3 (03 Sep 2019 00:00)  T(F): 98.2 (03 Sep 2019 08:00), Max: 99.2 (03 Sep 2019 00:00)  HR: 89 (03 Sep 2019 11:00) (89 - 113)  BP: 152/69 (03 Sep 2019 11:00) (109/54 - 159/69)  BP(mean): 90 (03 Sep 2019 11:00) (62 - 101)  RR: 16 (03 Sep 2019 11:00) (16 - 28)  SpO2: 97% (03 Sep 2019 11:00) (93% - 100%)    PHYSICAL EXAM:  General: [x ] non-toxic  HEAD/EYES: [ ] PERRL [ ] white sclera [ ] icterus  ENT:  [ ] normal [x ] supple [ ] thrush [ ] pharyngeal exudate  Cardiovascular:   [ ] murmur [ x] normal [ ] PPM/AICD  Respiratory:  [x ] clear to ausculation bilaterally  GI:  x[ ] soft, non-tender, normal bowel sounds  :  [ ] ho [ ] no CVA tenderness   Musculoskeletal:  [ ] no synovitis  Neurologic:  [ ] non-focal exam   Skin:  [x ] no rash  Lymph: [ ] no lymphadenopathy  Psychiatric:  [x ] appropriate affect [x  no phlebitis                              8.5    16.23 )-----------( 362      ( 03 Sep 2019 03:40 )             25.7       09-    141  |  107  |  20  ----------------------------<  124<H>  4.1   |  23  |  2.08<H>    Ca    9.1      03 Sep 2019 03:40  Phos  1.9       Mg     2.4             Urinalysis Basic - ( 02 Sep 2019 06:20 )    Color: COLORLESS / Appearance: CLEAR / S.006 / pH: 7.0  Gluc: NEGATIVE / Ketone: NEGATIVE  / Bili: NEGATIVE / Urobili: NORMAL   Blood: NEGATIVE / Protein: NEGATIVE / Nitrite: NEGATIVE   Leuk Esterase: TRACE / RBC: 0-2 / WBC 3-5   Sq Epi: FEW / Non Sq Epi: x / Bacteria: NEGATIVE        MICROBIOLOGY:Culture - Respiratory:   PSA^Pseudomonas aeruginosa  QUANTITY OF GROWTH: FEW (19 @ 17:03)  Culture - Respiratory:   Normal Respiratory Alma Delia Also Present  PSA^Pseudomonas aeruginosa  QUANTITY OF GROWTH: FEW (19 @ 17:03)      RADIOLOGY:
POST ANESTHESIA EVALUATION    79y Female POSTOP DAY 1 S/P     MENTAL STATUS: Patient participation [ X ] Awake     [  ] Arousable     [  ] Sedated    AIRWAY PATENCY: [X  ] Satisfactory  [  ] Other:     Vital Signs Last 24 Hrs  T(C): 36.7 (29 Aug 2019 16:00), Max: 37 (29 Aug 2019 04:00)  T(F): 98 (29 Aug 2019 16:00), Max: 98.6 (29 Aug 2019 04:00)  HR: 97 (29 Aug 2019 17:00) (70 - 97)  BP: 105/53 (29 Aug 2019 16:00) (104/70 - 142/67)  BP(mean): 64 (29 Aug 2019 16:00) (64 - 86)  RR: 20 (29 Aug 2019 17:00) (14 - 29)  SpO2: 95% (29 Aug 2019 17:00) (94% - 99%)  I&O's Summary    28 Aug 2019 07:01  -  29 Aug 2019 07:00  --------------------------------------------------------  IN: 570 mL / OUT: 210 mL / NET: 360 mL    29 Aug 2019 07:01  -  29 Aug 2019 17:13  --------------------------------------------------------  IN: 570 mL / OUT: 540 mL / NET: 30 mL          NAUSEA/ VOMITTING:  [ X ] NONE  [  ] CONTROLLED [  ] OTHER     PAIN: [ X ] CONTROLLED WITH CURRENT REGIMEN  [  ] OTHER    [ X ] NO APPARENT ANESTHESIA COMPLICATIONS      Comments:
Patient seen and examined in bed.       MEDICATIONS  (STANDING):  ciprofloxacin     Tablet 500 milliGRAM(s) Oral daily  escitalopram 20 milliGRAM(s) Oral daily  famotidine    Tablet 20 milliGRAM(s) Oral every 12 hours  gabapentin 300 milliGRAM(s) Oral daily  heparin  Injectable 5000 Unit(s) SubCutaneous every 8 hours  lactated ringers. 1000 milliLiter(s) (75 mL/Hr) IV Continuous <Continuous>  levalbuterol Inhalation 0.63 milliGRAM(s) Inhalation every 6 hours      VITAL:  T(C): , Max: 37.1 (09-03-19 @ 16:00)  T(F): , Max: 98.8 (09-03-19 @ 16:00)  HR: 94 (09-04-19 @ 08:45)  BP: 149/77 (09-04-19 @ 08:45)  BP(mean): 82 (09-03-19 @ 16:00)  RR: 17 (09-04-19 @ 08:45)  SpO2: 94% (09-04-19 @ 08:45)    I and O's:    09-03 @ 07:01  -  09-04 @ 07:00  --------------------------------------------------------  IN: 1335 mL / OUT: 851 mL / NET: 484 mL    09-04 @ 07:01  -  09-04 @ 09:38  --------------------------------------------------------  IN: 425 mL / OUT: 300 mL / NET: 125 mL          PHYSICAL EXAM:    Constitutional: NAD  Neck:  No JVD  Respiratory: CTAB/L  Cardiovascular: S1 and S2  Gastrointestinal: BS+, soft, NT/ND  Extremities: No peripheral edema  Neurological: A/O x 3, no focal deficits  Psychiatric: Normal mood, normal affect  : No Glaser  Skin: No rashes    LABS:                        7.8    12.35 )-----------( 391      ( 04 Sep 2019 05:25 )             24.3     09-04    142  |  107  |  19  ----------------------------<  107<H>  3.7   |  23  |  1.85<H>    Ca    9.3      04 Sep 2019 05:25  Phos  3.3     09-03  Mg     2.3     09-03    TPro  5.9<L>  /  Alb  2.7<L>  /  TBili  0.3  /  DBili  x   /  AST  33<H>  /  ALT  28  /  AlkPhos  115  09-04      ASSESSMENT/PLAN:  79 year old female c hx COPD sp VATS and now with non-oliguric RAUL. Chronologically started after the abx. Classically too soon unless had a previous exposure?  The urine osmolarity of 111 goes against RAUL from dehydration (from diarrhea)  She is not post obstructive    1. Renal - LR @ 75 cc/hr x 24 hours (to be completed this afternoon) Azotemia improving  2. ID - now on PO Cipro. Urine eosinophils negative.  3. Pulm - Standard DVT prophylaxis and nebs       Tish Jimenez NP-C  [facility]  [phone]

## 2019-09-04 NOTE — DISCHARGE NOTE PROVIDER - NSDCACTIVITY_GEN_ALL_CORE
Do not drive or operate machinery/Showering allowed/Do not make important decisions/Walking - Outdoors allowed/Walking - Indoors allowed/Stairs allowed/Sex allowed/No heavy lifting/straining

## 2019-09-04 NOTE — DISCHARGE NOTE PROVIDER - NSDCFUADDINST_GEN_ALL_CORE_FT
Walk 4-5 x per day. Increase as tolerated. You may climb stairs. Continue to use incentive spirometer.   You may keep wounds uncovered and shower daily.   Suture will be removed in office.

## 2019-09-04 NOTE — DISCHARGE NOTE PROVIDER - CARE PROVIDER_API CALL
Michael Brooks)  Surgery; Thoracic Surgery  [address] [place] [address], [place] [address]  Phone: [phone]  Fax: [phone]  Follow Up Time:     Coy Meier)  Internal Medicine; Nephrology  [facility] [facility], [address]  [place] [address]  Phone: [phone]  Fax: [phone]  Follow Up Time:

## 2019-09-04 NOTE — DISCHARGE NOTE NURSING/CASE MANAGEMENT/SOCIAL WORK - PATIENT PORTAL LINK FT
You can access the FollowMyHealth Patient Portal offered by [facility] by registering at the following website: [email or web address]. By joining [employer]’s FollowMyHealth portal, you will also be able to view your health information using other applications (apps) compatible with our system.

## 2019-09-04 NOTE — DISCHARGE NOTE PROVIDER - HOSPITAL COURSE
79 year old female presents to presurgical testing with diagnosis of solitary pulmonary nodule scheduled for flexible bronchoscopy, left video assisted thoracoscopy, lung resection for 8/28/19. Pt with hx of emphysema, recently started on trelegy, found a left upper lung nodule during work up for URI. Pt quit smoking in 7/2019. Pt with SOB with activity and chronic cough    Post op pt with increase atelectasis and opacification on CXR. S/p Bronchoscopy on 8/31, started on antibiotics. Pt initially with air leak in chest tube. eventually resolved and CT removed. BAL bronch confirmed pan sensitive Pseudomonas Pna. ID consulted.  Pt then developed RAUL. SEen by nephrology and placed on IVF hydration. Renal sono negative. Antibiotics changed to renal dosed Cipro to complete 7 more days. Today RAUL continues to improve, non oliguric with good u/o. Pt feels well. less SOB. Not requiring Oxygen. AMbulating with minimal assist. VATS c/d/i. Lungs w dec BS at bases. Cleared for d/c to home w outpt follow up by Dr. Meier and Dr. Brooks.     Vital Signs Last 24 Hrs    T(C): 36.9 (04 Sep 2019 08:45), Max: 37.1 (03 Sep 2019 16:00)    T(F): 98.4 (04 Sep 2019 08:45), Max: 98.8 (03 Sep 2019 16:00)    HR: 103 (04 Sep 2019 10:04) (83 - 111)    BP: 149/77 (04 Sep 2019 08:45) (137/64 - 168/77)    BP(mean): 82 (03 Sep 2019 16:00) (81 - 82)    RR: 17 (04 Sep 2019 08:45) (17 - 25)    SpO2: 96% (04 Sep 2019 10:04) (93% - 98%)

## 2019-09-04 NOTE — PROGRESS NOTE ADULT - PROVIDER SPECIALTY LIST ADULT
Anesthesia
Critical Care
Infectious Disease
Nephrology
Pain Medicine
Critical Care

## 2019-09-04 NOTE — DISCHARGE NOTE NURSING/CASE MANAGEMENT/SOCIAL WORK - NSDCFUADDAPPT_GEN_ALL_CORE_FT
See Dr. Brooks in 1-2 weeks. Call to make an apt. [phone]  Have a chest xray done prior to that apt and then bring those images with you.   See Dr. Meier Nephrologist on Sept 19th for follow up and repeat blood work.   Call to confirm your apt. Do not continue your Mobic at  home until kidney issues resolved.

## 2019-09-04 NOTE — DISCHARGE NOTE PROVIDER - NSDCFUADDAPPT_GEN_ALL_CORE_FT
See Dr. Brooks in 1-2 weeks. Call to make an apt. [phone]  Have a chest xray done prior to that apt and then bring those images with you.   See Dr. Meier Nephrologist on Sept 19th for follow up and repeat blood work.   Call to confirm your apt. Do not continue your Mobic at  home until kidney issues resolved.

## 2019-09-05 LAB — BACTERIA BLD CULT: SIGNIFICANT CHANGE UP

## 2019-09-13 LAB — SURGICAL PATHOLOGY STUDY: SIGNIFICANT CHANGE UP

## 2019-09-18 PROBLEM — Z09 POSTOP CHECK: Status: ACTIVE | Noted: 2019-09-18

## 2019-09-24 ENCOUNTER — APPOINTMENT (OUTPATIENT)
Dept: THORACIC SURGERY | Facility: CLINIC | Age: 79
End: 2019-09-24
Payer: MEDICARE

## 2019-09-24 ENCOUNTER — APPOINTMENT (OUTPATIENT)
Dept: RADIOLOGY | Facility: HOSPITAL | Age: 79
End: 2019-09-24

## 2019-09-24 ENCOUNTER — OUTPATIENT (OUTPATIENT)
Dept: OUTPATIENT SERVICES | Facility: HOSPITAL | Age: 79
LOS: 1 days | End: 2019-09-24
Payer: MEDICARE

## 2019-09-24 VITALS
RESPIRATION RATE: 18 BRPM | SYSTOLIC BLOOD PRESSURE: 164 MMHG | OXYGEN SATURATION: 98 % | HEART RATE: 105 BPM | DIASTOLIC BLOOD PRESSURE: 76 MMHG | TEMPERATURE: 98.1 F | HEIGHT: 64 IN | WEIGHT: 121 LBS | BODY MASS INDEX: 20.66 KG/M2

## 2019-09-24 DIAGNOSIS — C34.90 MALIGNANT NEOPLASM OF UNSPECIFIED PART OF UNSPECIFIED BRONCHUS OR LUNG: ICD-10-CM

## 2019-09-24 DIAGNOSIS — Z09 ENCOUNTER FOR FOLLOW-UP EXAMINATION AFTER COMPLETED TREATMENT FOR CONDITIONS OTHER THAN MALIGNANT NEOPLASM: ICD-10-CM

## 2019-09-24 PROCEDURE — 99024 POSTOP FOLLOW-UP VISIT: CPT

## 2019-09-24 PROCEDURE — 71046 X-RAY EXAM CHEST 2 VIEWS: CPT | Mod: 26

## 2019-12-27 ENCOUNTER — APPOINTMENT (OUTPATIENT)
Dept: RADIOLOGY | Facility: HOSPITAL | Age: 79
End: 2019-12-27
Payer: MEDICARE

## 2019-12-27 ENCOUNTER — APPOINTMENT (OUTPATIENT)
Dept: CT IMAGING | Facility: HOSPITAL | Age: 79
End: 2019-12-27
Payer: MEDICARE

## 2019-12-27 ENCOUNTER — OUTPATIENT (OUTPATIENT)
Dept: OUTPATIENT SERVICES | Facility: HOSPITAL | Age: 79
LOS: 1 days | End: 2019-12-27
Payer: MEDICARE

## 2019-12-27 DIAGNOSIS — R91.1 SOLITARY PULMONARY NODULE: ICD-10-CM

## 2019-12-27 PROCEDURE — 71250 CT THORAX DX C-: CPT

## 2019-12-27 PROCEDURE — 77074 RADEX OSSEOUS SURVEY LMTD: CPT | Mod: 26

## 2019-12-27 PROCEDURE — 71250 CT THORAX DX C-: CPT | Mod: 26

## 2019-12-27 PROCEDURE — 77074 RADEX OSSEOUS SURVEY LMTD: CPT

## 2020-01-07 ENCOUNTER — APPOINTMENT (OUTPATIENT)
Dept: THORACIC SURGERY | Facility: CLINIC | Age: 80
End: 2020-01-07
Payer: MEDICARE

## 2020-01-07 VITALS
RESPIRATION RATE: 17 BRPM | HEART RATE: 89 BPM | OXYGEN SATURATION: 97 % | WEIGHT: 124 LBS | SYSTOLIC BLOOD PRESSURE: 129 MMHG | DIASTOLIC BLOOD PRESSURE: 69 MMHG | BODY MASS INDEX: 21.28 KG/M2

## 2020-01-07 PROCEDURE — 99214 OFFICE O/P EST MOD 30 MIN: CPT

## 2020-01-07 NOTE — PHYSICAL EXAM
[Sclera] : the sclera and conjunctiva were normal [Extraocular Movements] : extraocular movements were intact [Neck Appearance] : the appearance of the neck was normal [Neck Cervical Mass (___cm)] : no neck mass was observed [] : no respiratory distress [Jugular Venous Distention Increased] : there was no jugular-venous distention [Respiration, Rhythm And Depth] : normal respiratory rhythm and effort [Exaggerated Use Of Accessory Muscles For Inspiration] : no accessory muscle use [Auscultation Breath Sounds / Voice Sounds] : lungs were clear to auscultation bilaterally [Heart Rate And Rhythm] : heart rate was normal and rhythm regular [Bowel Sounds] : normal bowel sounds [Diminished Respiratory Excursion] : normal chest expansion [Abdomen Soft] : soft [Abdomen Tenderness] : non-tender [Cervical Lymph Nodes Enlarged Posterior Bilaterally] : posterior cervical [Cervical Lymph Nodes Enlarged Anterior Bilaterally] : anterior cervical [Abnormal Walk] : normal gait [Supraclavicular Lymph Nodes Enlarged Bilaterally] : supraclavicular [Skin Color & Pigmentation] : normal skin color and pigmentation [No Focal Deficits] : no focal deficits [Oriented To Time, Place, And Person] : oriented to person, place, and time [Impaired Insight] : insight and judgment were intact [Affect] : the affect was normal [Mood] : the mood was normal

## 2020-01-13 NOTE — CONSULT LETTER
[Dear  ___] : Dear  [unfilled], [( Thank you for referring [unfilled] for consultation for _____ )] : Thank you for referring [unfilled] for consultation for [unfilled] [Consult Letter:] : I had the pleasure of evaluating your patient, [unfilled]. [Please see my note below.] : Please see my note below. [Consult Closing:] : Thank you very much for allowing me to participate in the care of this patient.  If you have any questions, please do not hesitate to contact me. [Sincerely,] : Sincerely, [DrHector  ___] : Dr. CARREON [DrHector ___] : Dr. CARREON [FreeTextEntry2] : Dr. Szymanski (Pulm/Ref)\par Dr. Fadia Hampton ((PCP)\par Dr. Hancock (Onc)  [FreeTextEntry3] : Michael Brooks MD, FACS \par Chief, Division of Thoracic Surgery \par Director, Minimally Invasive Thoracic Surgery \par Department of Cardiovascular and Thoracic Surgery \par [facility] \par , Cardiovascular and Thoracic Surgery

## 2020-01-13 NOTE — ASSESSMENT
[FreeTextEntry1] : 79 year old female presenting for postop evaluation S/P FB, uniportal Left VATS, SERA wedge resection x1, completion LULobectomy, MLND on 8/28/19. Pathology revealed lung T2N0 adenocarcinoma, solid, cribriform and acinar patterns. Postoperatively she had increased atelectasis and opacification on CXR and had a Flexible bronchoscopy and BAL on 8/31/19. BAL confirmed pan sensitive pseudomonas PNA. ID was consulted and she was started on antibiotics. She then developed RAUL and was seen by nephrology. ABX changed to renal dosed Cipro.  \par \par She is a former smoker (60 PYHx, Quit 7/2019) and PMHx includes HTN, HLD, Asthma, COPD and smoldering myeloma and is followed by Dr. Hancock.\par \par Patient evaluated by Dr. Hancock no adjuvant therapy for lung but patient was referred to [facility] for further workup of MM. \par \par CT chest scan 12/29/19 stable postsurgical changes. Emphysema. Interval resolution of previously mentioned "mixed groundglass and solid nodular opacity" within the left upper lobe. 7 mm subpleural right apical nodule, possibly scarring is unchanged. Mild linear atelectasis within the right middle lobe. -No new or enlarging pulmonary nodule. No pleural effusion. \par \par I have reviewed the patient's medical records and diagnostic images at time of this office consultation and have made the following recommendation:\par 1. I have recommended the patient follow up in 3 months with a CT scan of the chest. \par \par Written by Sonal Ash NP, acting as a scribe for Michael Brooks MD.\par The documentation recorded by the scribe accurately reflects the service I personally performed and the decisions made by me. Michael Brooks MD\par \par  \par \par

## 2020-01-13 NOTE — HISTORY OF PRESENT ILLNESS
[FreeTextEntry1] : Ms. FLAKO STUBBS is a 79 year old female presenting for postop evaluation S/P FB, uniportal Left VATS, SERA wedge resection x1, completion LULobectomy, MLND on 8/28/19. Pathology revealed lung T2N0 adenocarcinoma, solid, cribriform and acinar patterns. Postoperatively she had increased atelectasis and opacification on CXR and had a Flexible bronchoscopy and BAL on 8/31/19. BAL confirmed pan sensitive pseudomonas PNA. ID was consulted and she was started on antibiotics. She then developed RAUL and was seen by nephrology. ABX changed to renal dosed Cipro.  \par \par She is a former smoker (60 PYHx, Quit 7/2019) and PMHx includes HTN, HLD, Asthma, COPD and smoldering myeloma and is followed by Dr. Hancock.\par \par Patient evaluated by Dr. Hancock no adjuvant therapy for lung but patient was referred to [facility] for further workup of MM.\par \par CT chest scan 12/29/19 \par -Again noted, the patient is status post left upper lobectomy with stable postsurgical changes.\par  -Emphysema. \par -Interval resolution of previously mentioned "mixed groundglass and solid nodular opacity" within the left upper lobe.\par -7 mm subpleural right apical nodule, possibly scarring is unchanged. \par -Mild linear atelectasis within the right middle lobe. \par -No new or enlarging pulmonary nodule. No pleural effusion. \par \par CXR on 9/24/2019:\par -Left lung volume loss with postsurgical change. \par -No pneumothorax.\par -Improved left basilar aeration. \par -Residual opacity is likely due to a small loculated left pleural effusion with passive atelectasis.\par \par The patient denies recent URI,  fever, chills, dysphagia or hemoptysis. \par \par

## 2020-04-21 ENCOUNTER — APPOINTMENT (OUTPATIENT)
Dept: THORACIC SURGERY | Facility: CLINIC | Age: 80
End: 2020-04-21

## 2020-05-26 ENCOUNTER — APPOINTMENT (OUTPATIENT)
Dept: CT IMAGING | Facility: HOSPITAL | Age: 80
End: 2020-05-26
Payer: MEDICARE

## 2020-05-26 ENCOUNTER — OUTPATIENT (OUTPATIENT)
Dept: OUTPATIENT SERVICES | Facility: HOSPITAL | Age: 80
LOS: 1 days | End: 2020-05-26
Payer: MEDICARE

## 2020-05-26 ENCOUNTER — RESULT REVIEW (OUTPATIENT)
Age: 80
End: 2020-05-26

## 2020-05-26 DIAGNOSIS — C34.90 MALIGNANT NEOPLASM OF UNSPECIFIED PART OF UNSPECIFIED BRONCHUS OR LUNG: ICD-10-CM

## 2020-05-26 DIAGNOSIS — R91.1 SOLITARY PULMONARY NODULE: ICD-10-CM

## 2020-05-26 PROCEDURE — 71250 CT THORAX DX C-: CPT | Mod: 26

## 2020-05-26 PROCEDURE — 71250 CT THORAX DX C-: CPT

## 2020-11-11 ENCOUNTER — RESULT REVIEW (OUTPATIENT)
Age: 80
End: 2020-11-11

## 2020-11-11 ENCOUNTER — APPOINTMENT (OUTPATIENT)
Dept: RADIOLOGY | Facility: HOSPITAL | Age: 80
End: 2020-11-11
Payer: MEDICARE

## 2020-11-11 ENCOUNTER — APPOINTMENT (OUTPATIENT)
Dept: CT IMAGING | Facility: HOSPITAL | Age: 80
End: 2020-11-11
Payer: MEDICARE

## 2020-11-11 ENCOUNTER — OUTPATIENT (OUTPATIENT)
Dept: OUTPATIENT SERVICES | Facility: HOSPITAL | Age: 80
LOS: 1 days | End: 2020-11-11
Payer: MEDICARE

## 2020-11-11 DIAGNOSIS — C34.90 MALIGNANT NEOPLASM OF UNSPECIFIED PART OF UNSPECIFIED BRONCHUS OR LUNG: ICD-10-CM

## 2020-11-11 PROCEDURE — 71250 CT THORAX DX C-: CPT | Mod: 26

## 2020-11-11 PROCEDURE — 71250 CT THORAX DX C-: CPT

## 2020-11-11 PROCEDURE — 77074 RADEX OSSEOUS SURVEY LMTD: CPT

## 2020-11-11 PROCEDURE — 77074 RADEX OSSEOUS SURVEY LMTD: CPT | Mod: 26

## 2020-11-24 ENCOUNTER — APPOINTMENT (OUTPATIENT)
Dept: THORACIC SURGERY | Facility: CLINIC | Age: 80
End: 2020-11-24
Payer: MEDICARE

## 2020-11-24 DIAGNOSIS — R91.1 SOLITARY PULMONARY NODULE: ICD-10-CM

## 2020-11-24 PROCEDURE — 99443: CPT | Mod: 95

## 2020-11-27 NOTE — CONSULT LETTER
[Dear  ___] : Dear  [unfilled], [Consult Letter:] : I had the pleasure of evaluating your patient, [unfilled]. [( Thank you for referring [unfilled] for consultation for _____ )] : Thank you for referring [unfilled] for consultation for [unfilled] [Please see my note below.] : Please see my note below. [Consult Closing:] : Thank you very much for allowing me to participate in the care of this patient.  If you have any questions, please do not hesitate to contact me. [Sincerely,] : Sincerely, [FreeTextEntry2] : Dr. Szymanski (Pulm/Ref)\par Dr. Fadia Hampton ((PCP)\par Dr. Hancock (Onc)  [FreeTextEntry3] : Michael Brooks MD, FACS \par Chief, Division of Thoracic Surgery \par Director, Minimally Invasive Thoracic Surgery \par Department of Cardiovascular and Thoracic Surgery \par [facility] \par , Cardiovascular and Thoracic Surgery\par \par

## 2020-11-27 NOTE — HISTORY OF PRESENT ILLNESS
[Home] : at home, [unfilled] , at the time of the visit. [Medical Office: ([facility])___] : at the medical office located in  [Verbal consent obtained from patient] : the patient, [unfilled] [FreeTextEntry1] : Ms. FLAKO STUBBS is a 80 year old female, S/P FB, uniportal Left VATS, SERA wedge resection x1, completion LULobectomy, MLND on 8/28/19. Pathology revealed lung T2N0 adenocarcinoma, solid, cribriform and acinar patterns. Postoperatively she had increased atelectasis and opacification on CXR and had a Flexible bronchoscopy and BAL on 8/31/19. BAL confirmed pan sensitive pseudomonas PNA. ID was consulted and she was started on antibiotics. She then developed RAUL and was seen by nephrology. ABX changed to renal dosed Cipro. \par \par She is a former smoker (60 PYHx, Quit 7/2019) and PMHx includes HTN, HLD, Asthma, COPD and smoldering myeloma and is followed by Dr. Hancock.\par \par Patient evaluated by Dr. Hancock no adjuvant therapy for lung but patient was referred to [facility] for further workup of MM.\par \par CT chest scan 12/29/19 \par -Again noted, the patient is status post left upper lobectomy with stable postsurgical changes.\par  -Emphysema. \par -Interval resolution of previously mentioned "mixed groundglass and solid nodular opacity" within the left upper lobe.\par -7 mm subpleural right apical nodule, possibly scarring is unchanged. \par -Mild linear atelectasis within the right middle lobe. \par -No new or enlarging pulmonary nodule. No pleural effusion. \par \par CXR on 9/24/2019:\par -Left lung volume loss with postsurgical change. \par -No pneumothorax.\par -Improved left basilar aeration. \par -Residual opacity is likely due to a small loculated left pleural effusion with passive atelectasis.\par \par CT chest 05/26/2020:\par - post-op changes\par - 0.4 cm lucency with thickening of its wall in the LLL is slightly more prominent when compared to previous exam\par - several ill-defined opacities within the right lung are unchanged when compared to previous exam.\par - 2.1 cm low-attenuation lesion in the right kidney has increased in size when compared to previous exam. \par \par CT chest on 11/11/2020:\par - emphysema\par - 3 mm LLL nodule (2: 28) with slight interval increase in conspicuity may represent a focus of mucoid impacted distal airway\par - previously 4 mm lucency within the LLL with mild peripheral wall thickening is unchanged\par - several other bilateral small pulmonary nodules or nodular opacities with the largest in the RUL measuring about 5 mm (3: 33) are otherwise unchanged\par - hypodensity within the upper pole of the partially imaged right kidney unchanged. Partially imaged herniation of the retroperitoneal fat through the left posterolateral chest wall is again noted as on the prior study. \par \par Patient is followed today via Telephonic visit. Today patient denies SOB, chest pain, cough, hemoptysis, dyspnea upon exertion, fever, chills, night sweats, lightheadedness or dizziness.\par \par \par

## 2020-11-27 NOTE — DATA REVIEWED
[FreeTextEntry1] : CT chest on 11/11/2020:\par - emphysema\par - 3 mm LLL nodule (2: 28) with slight interval increase in conspicuity may represent a focus of mucoid impacted distal airway\par - previously 4 mm lucency within the LLL with mild peripheral wall thickening is unchanged\par - several other bilateral small pulmonary nodules or nodular opacities with the largest in the RUL measuring about 5 mm (3: 33) are otherwise unchanged\par - hypodensity within the upper pole of the partially imaged right kidney unchanged. Partially imaged herniation of the retroperitoneal fat through the left posterolateral chest wall is again noted as on the prior study.

## 2020-11-27 NOTE — ASSESSMENT
[FreeTextEntry1] : Ms. FLAKO STUBBS is a 80 year old female, S/P FB, uniportal Left VATS, SERA wedge resection x1, completion LULobectomy, MLND on 8/28/19. Pathology revealed lung T2N0 adenocarcinoma, solid, cribriform and acinar patterns. Postoperatively she had increased atelectasis and opacification on CXR and had a Flexible bronchoscopy and BAL on 8/31/19. BAL confirmed pan sensitive pseudomonas PNA. ID was consulted and she was started on antibiotics. She then developed RAUL and was seen by nephrology. ABX changed to renal dosed Cipro. \par \par She is a former smoker (60 PYHx, Quit 7/2019) and PMHx includes HTN, HLD, Asthma, COPD and smoldering myeloma and is followed by Dr. Hancock.\par \par Patient evaluated by Dr. Hancock no adjuvant therapy for lung but patient was referred to [facility] for further workup of MM.\par \par CT chest 05/26/2020:\par - post-op changes\par - 0.4 cm lucency with thickening of its wall in the LLL is slightly more prominent when compared to previous exam\par - several ill-defined opacities within the right lung are unchanged when compared to previous exam.\par - 2.1 cm low-attenuation lesion in the right kidney has increased in size when compared to previous exam. \par \par CT chest on 11/11/2020:\par - emphysema\par - 3 mm LLL nodule (2: 28) with slight interval increase in conspicuity may represent a focus of mucoid impacted distal airway\par - previously 4 mm lucency within the LLL with mild peripheral wall thickening is unchanged\par - several other bilateral small pulmonary nodules or nodular opacities with the largest in the RUL measuring about 5 mm (3: 33) are otherwise unchanged\par - hypodensity within the upper pole of the partially imaged right kidney unchanged. Partially imaged herniation of the retroperitoneal fat through the left posterolateral chest wall is again noted as on the prior study. \par \par I have reviewed the patient's medical records and diagnostic images at time of this office consultation; CT scan showed no evidence of disease recurrence; I recommended patient to return to office in 3 months with follow up CT chest, no contrast. \par \par I have spent 25 minutes on the phone discussing above result and plan of care with patient.\par \par \par I personally performed the services described in the documentation, reviewed the documentation recorded by the scribe in my presence and it accurately and completely records my words and actions.\par \par I, MAYA Thomas-C, am scribing for and the presence of VANESSA WilcoxIM, the following sections HISTORY OF PRESENT ILLNESS, PAST MEDICAL/FAMILY/SOCIAL HISTORY; REVIEW OF SYSTEMS; VITAL SIGNS; PHYSICAL EXAM; DISPOSITION.\par \par

## 2021-04-19 ENCOUNTER — APPOINTMENT (OUTPATIENT)
Dept: CT IMAGING | Facility: HOSPITAL | Age: 81
End: 2021-04-19
Payer: MEDICARE

## 2021-04-19 ENCOUNTER — OUTPATIENT (OUTPATIENT)
Dept: OUTPATIENT SERVICES | Facility: HOSPITAL | Age: 81
LOS: 1 days | End: 2021-04-19
Payer: MEDICARE

## 2021-04-19 ENCOUNTER — RESULT REVIEW (OUTPATIENT)
Age: 81
End: 2021-04-19

## 2021-04-19 DIAGNOSIS — C34.90 MALIGNANT NEOPLASM OF UNSPECIFIED PART OF UNSPECIFIED BRONCHUS OR LUNG: ICD-10-CM

## 2021-04-19 PROCEDURE — 71250 CT THORAX DX C-: CPT | Mod: 26,ME

## 2021-04-19 PROCEDURE — G1004: CPT

## 2021-04-19 PROCEDURE — 70450 CT HEAD/BRAIN W/O DYE: CPT | Mod: 26,MH

## 2021-04-19 PROCEDURE — 70450 CT HEAD/BRAIN W/O DYE: CPT

## 2021-04-19 PROCEDURE — 71250 CT THORAX DX C-: CPT

## 2021-04-23 ENCOUNTER — INPATIENT (INPATIENT)
Facility: HOSPITAL | Age: 81
LOS: 0 days | Discharge: HOME | DRG: 69 | End: 2021-04-24
Attending: HOSPITALIST | Admitting: HOSPITALIST
Payer: COMMERCIAL

## 2021-04-23 VITALS
SYSTOLIC BLOOD PRESSURE: 135 MMHG | TEMPERATURE: 98 F | RESPIRATION RATE: 16 BRPM | HEART RATE: 87 BPM | WEIGHT: 125 LBS | DIASTOLIC BLOOD PRESSURE: 67 MMHG | HEIGHT: 62 IN | OXYGEN SATURATION: 97 %

## 2021-04-23 DIAGNOSIS — Z90.2 ACQUIRED ABSENCE OF LUNG [PART OF]: Chronic | ICD-10-CM

## 2021-04-23 DIAGNOSIS — G45.9 TRANSIENT CEREBRAL ISCHEMIC ATTACK, UNSPECIFIED: ICD-10-CM

## 2021-04-23 LAB
ALBUMIN SERPL ELPH-MCNC: 3.9 G/DL — SIGNIFICANT CHANGE UP (ref 3.3–5)
ALP SERPL-CCNC: 94 U/L — SIGNIFICANT CHANGE UP (ref 40–120)
ALT FLD-CCNC: 20 U/L — SIGNIFICANT CHANGE UP (ref 10–45)
ANION GAP SERPL CALC-SCNC: 10 MMOL/L — SIGNIFICANT CHANGE UP (ref 5–17)
APPEARANCE UR: CLEAR — SIGNIFICANT CHANGE UP
APTT BLD: 30.4 SEC — SIGNIFICANT CHANGE UP (ref 27.5–35.5)
AST SERPL-CCNC: 18 U/L — SIGNIFICANT CHANGE UP (ref 10–40)
BASOPHILS # BLD AUTO: 0.08 K/UL — SIGNIFICANT CHANGE UP (ref 0–0.2)
BASOPHILS NFR BLD AUTO: 0.6 % — SIGNIFICANT CHANGE UP (ref 0–2)
BILIRUB SERPL-MCNC: 0.4 MG/DL — SIGNIFICANT CHANGE UP (ref 0.2–1.2)
BILIRUB UR-MCNC: NEGATIVE — SIGNIFICANT CHANGE UP
BUN SERPL-MCNC: 22 MG/DL — SIGNIFICANT CHANGE UP (ref 7–23)
CALCIUM SERPL-MCNC: 9.4 MG/DL — SIGNIFICANT CHANGE UP (ref 8.4–10.5)
CHLORIDE SERPL-SCNC: 104 MMOL/L — SIGNIFICANT CHANGE UP (ref 96–108)
CO2 BLDV-SCNC: 26 MMOL/L — SIGNIFICANT CHANGE UP (ref 21–29)
CO2 SERPL-SCNC: 25 MMOL/L — SIGNIFICANT CHANGE UP (ref 22–31)
COLOR SPEC: YELLOW — SIGNIFICANT CHANGE UP
CREAT SERPL-MCNC: 1.02 MG/DL — SIGNIFICANT CHANGE UP (ref 0.5–1.3)
DIFF PNL FLD: NEGATIVE — SIGNIFICANT CHANGE UP
EOSINOPHIL # BLD AUTO: 0.25 K/UL — SIGNIFICANT CHANGE UP (ref 0–0.5)
EOSINOPHIL NFR BLD AUTO: 1.8 % — SIGNIFICANT CHANGE UP (ref 0–6)
ETHANOL SERPL-MCNC: <3 MG/DL — SIGNIFICANT CHANGE UP (ref 0–3)
GAS PNL BLDV: SIGNIFICANT CHANGE UP
GLUCOSE SERPL-MCNC: 90 MG/DL — SIGNIFICANT CHANGE UP (ref 70–99)
GLUCOSE UR QL: NEGATIVE — SIGNIFICANT CHANGE UP
HCT VFR BLD CALC: 33.9 % — LOW (ref 34.5–45)
HGB BLD-MCNC: 11.2 G/DL — LOW (ref 11.5–15.5)
IMM GRANULOCYTES NFR BLD AUTO: 0.4 % — SIGNIFICANT CHANGE UP (ref 0–1.5)
INR BLD: 0.98 RATIO — SIGNIFICANT CHANGE UP (ref 0.88–1.16)
KETONES UR-MCNC: NEGATIVE — SIGNIFICANT CHANGE UP
LEUKOCYTE ESTERASE UR-ACNC: ABNORMAL
LYMPHOCYTES # BLD AUTO: 31.9 % — SIGNIFICANT CHANGE UP (ref 13–44)
LYMPHOCYTES # BLD AUTO: 4.33 K/UL — HIGH (ref 1–3.3)
MCHC RBC-ENTMCNC: 31.2 PG — SIGNIFICANT CHANGE UP (ref 27–34)
MCHC RBC-ENTMCNC: 33 GM/DL — SIGNIFICANT CHANGE UP (ref 32–36)
MCV RBC AUTO: 94.4 FL — SIGNIFICANT CHANGE UP (ref 80–100)
MONOCYTES # BLD AUTO: 1.48 K/UL — HIGH (ref 0–0.9)
MONOCYTES NFR BLD AUTO: 10.9 % — SIGNIFICANT CHANGE UP (ref 2–14)
NEUTROPHILS # BLD AUTO: 7.4 K/UL — SIGNIFICANT CHANGE UP (ref 1.8–7.4)
NEUTROPHILS NFR BLD AUTO: 54.4 % — SIGNIFICANT CHANGE UP (ref 43–77)
NITRITE UR-MCNC: NEGATIVE — SIGNIFICANT CHANGE UP
NRBC # BLD: 0 /100 WBCS — SIGNIFICANT CHANGE UP (ref 0–0)
PCO2 BLDV: 42 MMHG — SIGNIFICANT CHANGE UP (ref 39–42)
PH BLDV: 7.39 — SIGNIFICANT CHANGE UP (ref 7.35–7.45)
PH UR: 5 — SIGNIFICANT CHANGE UP (ref 5–8)
PLATELET # BLD AUTO: 351 K/UL — SIGNIFICANT CHANGE UP (ref 150–400)
PO2 BLDV: 30 MMHG — SIGNIFICANT CHANGE UP (ref 25–45)
POTASSIUM SERPL-MCNC: 4 MMOL/L — SIGNIFICANT CHANGE UP (ref 3.5–5.3)
POTASSIUM SERPL-SCNC: 4 MMOL/L — SIGNIFICANT CHANGE UP (ref 3.5–5.3)
PROT SERPL-MCNC: 7.8 G/DL — SIGNIFICANT CHANGE UP (ref 6–8.3)
PROT UR-MCNC: 15
PROTHROM AB SERPL-ACNC: 11.9 SEC — SIGNIFICANT CHANGE UP (ref 10.6–13.6)
RBC # BLD: 3.59 M/UL — LOW (ref 3.8–5.2)
RBC # FLD: 13.2 % — SIGNIFICANT CHANGE UP (ref 10.3–14.5)
SAO2 % BLDV: 53 % — LOW (ref 67–88)
SARS-COV-2 RNA SPEC QL NAA+PROBE: SIGNIFICANT CHANGE UP
SODIUM SERPL-SCNC: 139 MMOL/L — SIGNIFICANT CHANGE UP (ref 135–145)
SP GR SPEC: 1.01 — SIGNIFICANT CHANGE UP (ref 1.01–1.02)
TROPONIN I SERPL-MCNC: <.017 NG/ML — LOW (ref 0.02–0.06)
UROBILINOGEN FLD QL: NEGATIVE — SIGNIFICANT CHANGE UP
WBC # BLD: 13.59 K/UL — HIGH (ref 3.8–10.5)
WBC # FLD AUTO: 13.59 K/UL — HIGH (ref 3.8–10.5)

## 2021-04-23 PROCEDURE — 99223 1ST HOSP IP/OBS HIGH 75: CPT | Mod: AI

## 2021-04-23 PROCEDURE — 93010 ELECTROCARDIOGRAM REPORT: CPT

## 2021-04-23 PROCEDURE — 99407 BEHAV CHNG SMOKING > 10 MIN: CPT

## 2021-04-23 PROCEDURE — 70498 CT ANGIOGRAPHY NECK: CPT | Mod: 26

## 2021-04-23 PROCEDURE — 71045 X-RAY EXAM CHEST 1 VIEW: CPT | Mod: 26

## 2021-04-23 PROCEDURE — 0042T: CPT

## 2021-04-23 PROCEDURE — 70450 CT HEAD/BRAIN W/O DYE: CPT | Mod: 26,59,MA

## 2021-04-23 PROCEDURE — 99285 EMERGENCY DEPT VISIT HI MDM: CPT | Mod: CS

## 2021-04-23 PROCEDURE — 70496 CT ANGIOGRAPHY HEAD: CPT | Mod: 26

## 2021-04-23 RX ORDER — ESCITALOPRAM OXALATE 10 MG/1
10 TABLET, FILM COATED ORAL DAILY
Refills: 0 | Status: DISCONTINUED | OUTPATIENT
Start: 2021-04-23 | End: 2021-04-24

## 2021-04-23 RX ORDER — ENOXAPARIN SODIUM 100 MG/ML
40 INJECTION SUBCUTANEOUS DAILY
Refills: 0 | Status: DISCONTINUED | OUTPATIENT
Start: 2021-04-23 | End: 2021-04-24

## 2021-04-23 RX ORDER — ATORVASTATIN CALCIUM 80 MG/1
40 TABLET, FILM COATED ORAL ONCE
Refills: 0 | Status: COMPLETED | OUTPATIENT
Start: 2021-04-23 | End: 2021-04-23

## 2021-04-23 RX ORDER — ASPIRIN/CALCIUM CARB/MAGNESIUM 324 MG
81 TABLET ORAL DAILY
Refills: 0 | Status: DISCONTINUED | OUTPATIENT
Start: 2021-04-24 | End: 2021-04-24

## 2021-04-23 RX ORDER — GABAPENTIN 400 MG/1
1 CAPSULE ORAL
Qty: 0 | Refills: 0 | DISCHARGE

## 2021-04-23 RX ORDER — ASPIRIN/CALCIUM CARB/MAGNESIUM 324 MG
324 TABLET ORAL DAILY
Refills: 0 | Status: DISCONTINUED | OUTPATIENT
Start: 2021-04-23 | End: 2021-04-23

## 2021-04-23 RX ORDER — HYDROCHLOROTHIAZIDE 25 MG
25 TABLET ORAL DAILY
Refills: 0 | Status: DISCONTINUED | OUTPATIENT
Start: 2021-04-23 | End: 2021-04-24

## 2021-04-23 RX ORDER — ATORVASTATIN CALCIUM 80 MG/1
40 TABLET, FILM COATED ORAL AT BEDTIME
Refills: 0 | Status: DISCONTINUED | OUTPATIENT
Start: 2021-04-24 | End: 2021-04-24

## 2021-04-23 RX ORDER — ATORVASTATIN CALCIUM 80 MG/1
40 TABLET, FILM COATED ORAL AT BEDTIME
Refills: 0 | Status: DISCONTINUED | OUTPATIENT
Start: 2021-04-23 | End: 2021-04-23

## 2021-04-23 RX ORDER — ESCITALOPRAM OXALATE 10 MG/1
1 TABLET, FILM COATED ORAL
Qty: 0 | Refills: 0 | DISCHARGE

## 2021-04-23 RX ORDER — LOSARTAN POTASSIUM 100 MG/1
100 TABLET, FILM COATED ORAL DAILY
Refills: 0 | Status: DISCONTINUED | OUTPATIENT
Start: 2021-04-23 | End: 2021-04-24

## 2021-04-23 RX ORDER — FLUTICASONE FUROATE, UMECLIDINIUM BROMIDE AND VILANTEROL TRIFENATATE 200; 62.5; 25 UG/1; UG/1; UG/1
1 POWDER RESPIRATORY (INHALATION)
Qty: 0 | Refills: 0 | DISCHARGE

## 2021-04-23 RX ORDER — ACETAMINOPHEN 500 MG
2 TABLET ORAL
Qty: 0 | Refills: 0 | DISCHARGE

## 2021-04-23 RX ADMIN — Medication 324 MILLIGRAM(S): at 16:39

## 2021-04-23 RX ADMIN — ATORVASTATIN CALCIUM 40 MILLIGRAM(S): 80 TABLET, FILM COATED ORAL at 18:39

## 2021-04-23 RX ADMIN — LOSARTAN POTASSIUM 100 MILLIGRAM(S): 100 TABLET, FILM COATED ORAL at 23:07

## 2021-04-23 RX ADMIN — ESCITALOPRAM OXALATE 10 MILLIGRAM(S): 10 TABLET, FILM COATED ORAL at 23:07

## 2021-04-23 RX ADMIN — Medication 25 MILLIGRAM(S): at 23:08

## 2021-04-23 NOTE — H&P ADULT - NSHPSOCIALHISTORY_GEN_ALL_CORE
lives alone  retired  Smokes cigarettes (5-10 cigs a day for 60 years)  Quit ETOH 15 years ago  Denies drug use

## 2021-04-23 NOTE — ED ADULT NURSE REASSESSMENT NOTE - NS ED NURSE REASSESS COMMENT FT1
RAHEEM pozo at bedside for pt admission to [facility] at this time. pt daughter also remains at bedside, will continue to monitor closely. MD jurado at bedside for pt admission to tele at this time. pt daughter also remains at bedside, will continue to monitor closely.

## 2021-04-23 NOTE — ED ADULT NURSE NOTE - OBJECTIVE STATEMENT
pt brought in by daughter from home for sudden onset of difficulty finding the right words to say 20 minutes PTA around 330pm. 1 week ago pt did have a fall and hit her head. saw pcp 4 days ago and had CT head which showed old infarcts. no LOC at the time. denies fever, chills, numbness, tingling, difficulty walking, headache, vision changes, n/v/d, abd pain, cp, sob. pt with no neuro deficits, able to follow all commands and with normal speech on arrival to ED. pt with PMH of emphysema, anxiety and HTN.

## 2021-04-23 NOTE — ED PROVIDER NOTE - OBJECTIVE STATEMENT
pt 80y f brought in by daughter for sudden onset of difficulty finding the right words to say 20 minutes PTA around 330pm. 1 week ago pt did have a fall and hit her head. saw pcp 4 days ago and had CT head which showed old infarcts. no LOC at the time  denies fever, chills, numbness, tingling, difficulty walking, headache, vision changes, n/v/d, abd pain, cp, sob

## 2021-04-23 NOTE — H&P ADULT - ASSESSMENT
81 yo F with PMHx active smoking, HLD, HTN, depression presents to the ER with aphasia that lasted for an hour. CT head negative for acute stroke. Admitted for suspected TIA.    IMPROVE VTE Individual Risk Assessment    RISK                                                                Points    [  ] Previous VTE                                                  3    [  ] Thrombophilia                                               2    [  ] Lower limb paralysis                                      2        (unable to hold up >15 seconds)      [  ] Current Cancer                                              2         (within 6 months)    [  ] Immobilization > 24 hrs                                1    [  ] ICU/CCU stay > 24 hours                              1    [ x ] Age > 60                                                      1    IMPROVE VTE Score ______1___    IMPROVE Score 0-1: Low Risk, No VTE prophylaxis required for most patients, encourage ambulation.   IMPROVE Score 2-3: At risk, pharmacologic VTE prophylaxis is indicated for most patients (in the absence of a contraindication)  IMPROVE Score > or = 4: High Risk, pharmacologic VTE prophylaxis is indicated for most patients (in the absence of a contraindication)    *Aphasia:  Suspected TIA  CT stroke protcol done in ER--negative for acute stroke  ASA 81 daily  lipitor 80 mg daily - will titrate based on lipid profile  Lipid profile, A1C  DVT ppx  PT evaluation  -BP control --cont losartan and HCTZ   -telemetry monitoring to r/o arrhythmia   -Repeat CT head if any further changes in neuro status  -Echocardiagram  -Carotid sono   -Smoking cessation advised--patient reports she quit last week   -Neurology consult- Dr Garcia notified --patient scheduled for outpatient appt with neuro with Dr Chavez on 5/4    *HTN:  Cont losartan  Cont HCTZ    *HLD:  Fup lipid profile  High dose statin for now (home dose is zocor 10 mg)    *Depression:  Cont lexapro    *Active smoker:  smoking cessation advised  patient reports she quit last week  Not interested in nicotine patch     DVT ppx: lovenox  Dispo: home suspect within 24-48 hrs pending further workup    DaughterJosy updated at bedside [phone]    Code Status: FULL Code        Case d/w Dr Garcia (neuro) 81 yo F with PMHx active smoking, HLD, HTN, depression presents to the ER with aphasia that lasted for an hour. CT head negative for acute stroke. Admitted for suspected TIA.    IMPROVE VTE Individual Risk Assessment    RISK                                                                Points    [  ] Previous VTE                                                  3    [  ] Thrombophilia                                               2    [  ] Lower limb paralysis                                      2        (unable to hold up >15 seconds)      [  ] Current Cancer                                              2         (within 6 months)    [  ] Immobilization > 24 hrs                                1    [  ] ICU/CCU stay > 24 hours                              1    [ x ] Age > 60                                                      1    IMPROVE VTE Score ______1___    IMPROVE Score 0-1: Low Risk, No VTE prophylaxis required for most patients, encourage ambulation.   IMPROVE Score 2-3: At risk, pharmacologic VTE prophylaxis is indicated for most patients (in the absence of a contraindication)  IMPROVE Score > or = 4: High Risk, pharmacologic VTE prophylaxis is indicated for most patients (in the absence of a contraindication)    *Aphasia:  Suspected TIA  CT stroke protcol done in ER--negative for acute stroke  ASA 81 daily  lipitor 80 mg daily - will titrate based on lipid profile  Lipid profile, A1C  DVT ppx  PT evaluation  -BP control --cont losartan and HCTZ   -telemetry monitoring to r/o arrhythmia   -Repeat CT head if any further changes in neuro status  -Echocardiagram  -Carotid sono   -Smoking cessation advised--patient reports she quit last week   -Neurology consult- Dr Garcia notified --patient scheduled for outpatient appt with neuro with Dr Chavez on 5/4    *HTN:  Cont losartan  Cont HCTZ    *HLD:  Fup lipid profile  High dose statin for now (home dose is zocor 10 mg)    *Leukocytosis:  WBC 13  patient with h/o myeloma--gets B12 injections  No signs of infection at this time    *Anemia:  stable  suspect anemia of chronic disease vs patient with known Vit b12 def (although MCV normal)  Cont to monitor     *Depression:  Cont lexapro    *Active smoker:  smoking cessation advised  patient reports she quit last week  Not interested in nicotine patch     DVT ppx: lovenox  Dispo: home suspect within 24-48 hrs pending further workup    DaughterJosy updated at bedside [phone]    Code Status: FULL Code        Case d/w Dr Garcia (neuro) 79 yo F with PMHx active smoking, HLD, HTN, depression presents to the ER with aphasia that lasted for an hour. CT head negative for acute stroke. Admitted for suspected TIA.    IMPROVE VTE Individual Risk Assessment    RISK                                                                Points    [  ] Previous VTE                                                  3    [  ] Thrombophilia                                               2    [  ] Lower limb paralysis                                      2        (unable to hold up >15 seconds)      [  ] Current Cancer                                              2         (within 6 months)    [  ] Immobilization > 24 hrs                                1    [  ] ICU/CCU stay > 24 hours                              1    [ x ] Age > 60                                                      1    IMPROVE VTE Score ______1___    IMPROVE Score 0-1: Low Risk, No VTE prophylaxis required for most patients, encourage ambulation.   IMPROVE Score 2-3: At risk, pharmacologic VTE prophylaxis is indicated for most patients (in the absence of a contraindication)  IMPROVE Score > or = 4: High Risk, pharmacologic VTE prophylaxis is indicated for most patients (in the absence of a contraindication)    *Aphasia: - mostly resolved by the time of evaluation   Suspected TIA  CT stroke protcol done in ER--negative for acute stroke  ASA 81 daily  lipitor 80 mg daily - will titrate based on lipid profile  Lipid profile, A1C  DVT ppx  PT evaluation  -BP control --cont losartan and HCTZ   -telemetry monitoring to r/o arrhythmia   -Repeat CT head if any further changes in neuro status  -Echocardiagram  -Carotid sono   -Smoking cessation advised--patient reports she quit last week   -Neurology consult- Dr Garcia notified --patient scheduled for outpatient appt with neuro with Dr Chavez on 5/4    *HTN:  Cont losartan  Cont HCTZ    *HLD:  Fup lipid profile  High dose statin for now (home dose is zocor 10 mg)    *Leukocytosis:  WBC 13  patient with h/o myeloma--gets B12 injections  No signs of infection at this time    *Anemia:  stable  suspect anemia of chronic disease vs patient with known Vit b12 def (although MCV normal)  Cont to monitor     *Depression:  Cont lexapro    *Active smoker:  smoking cessation advised  patient reports she quit last week  Not interested in nicotine patch     DVT ppx: lovenox  Dispo: home suspect within 24-48 hrs pending further workup    DaughterJosy updated at bedside [phone]    Code Status: FULL Code    Case d/w Dr Garcia (neuro)

## 2021-04-23 NOTE — ED PROVIDER NOTE - CLINICAL SUMMARY MEDICAL DECISION MAKING FREE TEXT BOX
pt 80y f brought in by daughter for sudden onset of difficulty finding the right words to say 20 minutes PTA around 330pm. 1 week ago pt did have a fall and hit her head. saw pcp 4 days ago and had CT head which showed old infarcts. no LOC at the time  denies fever, chills, numbness, tingling, difficulty walking, headache, vision changes, n/v/d, abd pain, cp, sob  NIH 1: difficulty word finding  pt has CT head and after discussion with pts daughter pt had elevated creatinine last blood work and wants to speak with neuro and wait to see if CTA is necessary. while calling neuro stroke team pts symptoms resolved. discussed with neuro at [facility] and pt to have cta if creatinine wnl and will admit

## 2021-04-23 NOTE — ED PROVIDER NOTE - PHYSICAL EXAMINATION
General:     NAD, well-nourished, well-appearing  Eyes: PERRL  Head:     NC/AT, EOMI, oral mucosa moist  Neck:     trachea midline  Lungs:     CTA b/l  CVS:     RRR  Abd:     +BS, s/nt/nd  Ext:   no deformities   Neuro: AAOx3, normal gait  NIH: 1- difficulty word finding

## 2021-04-23 NOTE — H&P ADULT - NSICDXFAMILYHX_GEN_ALL_CORE_FT
FAMILY HISTORY:  Mother  Still living? No  Maternal family history of hypertension, Age at diagnosis: Age Unknown

## 2021-04-23 NOTE — H&P ADULT - NSHPLABSRESULTS_GEN_ALL_CORE
LABS:                        11.2   13.59 )-----------( 351      ( 23 Apr 2021 16:15 )             33.9     04-23    139  |  104  |  22  ----------------------------<  90  4.0   |  25  |  1.02    Ca    9.4      23 Apr 2021 16:15    TPro  7.8  /  Alb  3.9  /  TBili  0.4  /  DBili  x   /  AST  18  /  ALT  20  /  AlkPhos  94  04-23    PT/INR - ( 23 Apr 2021 16:15 )   PT: 11.9 sec;   INR: 0.98 ratio         PTT - ( 23 Apr 2021 16:15 )  PTT:30.4 sec     CAPILLARY BLOOD GLUCOSE      POCT Blood Glucose.: 90 mg/dL (23 Apr 2021 16:04)            RADIOLOGY & ADDITIONAL TESTS:  ct< from: CT Perfusion w/ Maps w/ IV Cont (04.23.21 @ 17:53) >    IMPRESSION:    CTA brain: No hemodynamically significant stenosis    CTA carotid/vertebral artery circulation: No hemodynamically significant stenosis    CT Perfusion: Normal    Incidental: There is a mild ascending aortic aneurysm measuring up to 3.5 cm in greatest diameter.      < end of copied text >    < from: CT Angio Neck w/ IV Cont (04.23.21 @ 17:53) >    IMPRESSION:    CTA brain: No hemodynamically significant stenosis    CTA carotid/vertebral artery circulation: No hemodynamically significant stenosis    CT Perfusion: Normal    Incidental: There is a mild ascending aortic aneurysm measuring up to 3.5 cm in greatest diameter.        < end of copied text >    < from: CT Brain Stroke Protocol (04.23.21 @ 16:17) >      IMPRESSION: Age-appropriate involutional and ischemic gliotic changes. No hemorrhage. 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    Dr. Hussein discussed these findings with Dr. Solomon on 4/23/2021 4:21 PM with read back.        < end of copied text >    [x] EKG NSR HR 74, NS ST T changes    Consultant(s) Notes Reviewed:  [x ] YES  [ ] NO  Care Discussed with Consultants/Other Providers [ x] YES  [ ] NO  Imaging Personally Reviewed:  [ x] YES  [ ] NO

## 2021-04-23 NOTE — ED ADULT TRIAGE NOTE - CHIEF COMPLAINT QUOTE
As per daughter, slurred speech starting 20 min PTA. As per daughter, slurred speech starting 20 min PTA.  ISAR positive

## 2021-04-23 NOTE — H&P ADULT - ATTENDING COMMENTS
Aphasia that mostly resolved  Suspected TIA - patient CT head showed old lacunar infarct  - will need TTE/carotids/neurology evaluation/telemetry    Smoking cessation  - strongly advised smoking cessation  - - provided 16 minutes of smoking cessation counseling and patient stated she would try

## 2021-04-23 NOTE — ED ADULT NURSE NOTE - PMH
Anxiety and depression    Arthritis    Emphysema lung    HTN (hypertension)    Lower back pain    Solitary pulmonary nodule

## 2021-04-23 NOTE — H&P ADULT - NSHPPHYSICALEXAM_GEN_ALL_CORE
T(C): 36.8 (04-23-21 @ 16:03), Max: 36.8 (04-23-21 @ 16:03)  HR: 68 (04-23-21 @ 16:45) (68 - 87)  BP: 119/50 (04-23-21 @ 16:45) (106/80 - 135/67)  RR: 13 (04-23-21 @ 16:45) (13 - 18)  SpO2: 98% (04-23-21 @ 16:45) (97% - 98%)  Wt(kg): --Vital Signs Last 24 Hrs  T(C): 36.8 (23 Apr 2021 16:03), Max: 36.8 (23 Apr 2021 16:03)  T(F): 98.2 (23 Apr 2021 16:03), Max: 98.2 (23 Apr 2021 16:03)  HR: 68 (23 Apr 2021 16:45) (68 - 87)  BP: 119/50 (23 Apr 2021 16:45) (106/80 - 135/67)  BP(mean): 66 (23 Apr 2021 16:45) (66 - 86)  RR: 13 (23 Apr 2021 16:45) (13 - 18)  SpO2: 98% (23 Apr 2021 16:45) (97% - 98%)    PHYSICAL EXAM:  GENERAL: NAD, well-groomed, well-developed, elderly  HEAD:  Atraumatic, Normocephalic  EYES: EOMI, PERRLA, conjunctiva and sclera clear  ENMT: No tonsillar erythema, exudates, or enlargement; Moist mucous membranes, Good dentition, No lesions  NECK: Supple, No JVD, Normal thyroid  NERVOUS SYSTEM:  Alert & Oriented X3, Good concentration; Motor Strength 5/5 B/L upper and lower extremities; DTRs 2+ intact and symmetric  CHEST/LUNG: Clear to percussion bilaterally; No rales, rhonchi, wheezing, or rubs  HEART: Regular rate and rhythm; No murmurs, rubs, or gallops  ABDOMEN: Soft, Nontender, Nondistended; Bowel sounds present  EXTREMITIES:  2+ Peripheral Pulses, No clubbing, cyanosis, or edema  LYMPH: No lymphadenopathy noted  SKIN: No rashes or lesions, s

## 2021-04-23 NOTE — H&P ADULT - NSHPREVIEWOFSYSTEMS_GEN_ALL_CORE
REVIEW OF SYSTEMS:  CONSTITUTIONAL: No fever, weight loss, or fatigue  EYES: No eye pain, visual disturbances, or discharge  ENMT:  No difficulty hearing, tinnitus, vertigo; No sinus or throat pain  NECK: No pain or stiffness  BREASTS: No pain, masses, or nipple discharge  RESPIRATORY: No cough, wheezing, chills or hemoptysis; No shortness of breath  CARDIOVASCULAR: No chest pain, palpitations, dizziness, or leg swelling  GASTROINTESTINAL: No abdominal or epigastric pain. No nausea, vomiting, or hematemesis; No diarrhea or constipation. No melena or hematochezia.  GENITOURINARY: No dysuria, frequency, hematuria, or incontinence  NEUROLOGICAL: see hpi   SKIN: No itching, burning, rashes, or lesions   LYMPH NODES: No enlarged glands  ENDOCRINE: No heat or cold intolerance; No hair loss  MUSCULOSKELETAL: No joint pain or swelling; No muscle, back, or extremity pain  PSYCHIATRIC: No depression, anxiety, mood swings, or difficulty sleeping  HEME/LYMPH: No easy bruising, or bleeding gums  ALLERY AND IMMUNOLOGIC: No hives or eczema    ALL ROS REVIEWED AND NORMAL EXCEPT AS STATED ABOVE

## 2021-04-23 NOTE — ED PROVIDER NOTE - ATTENDING CONTRIBUTION TO CARE
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 Conrad:  I have reviewed and discussed with the PA/ resident the case specifics, including the history, physical assessment, evaluation, conclusion, laboratory results, and medical plan. I agree with the contents, and conclusions. I have personally examined, and interviewed the patient.

## 2021-04-24 ENCOUNTER — TRANSCRIPTION ENCOUNTER (OUTPATIENT)
Age: 81
End: 2021-04-24

## 2021-04-24 VITALS
OXYGEN SATURATION: 97 % | TEMPERATURE: 99 F | DIASTOLIC BLOOD PRESSURE: 66 MMHG | RESPIRATION RATE: 16 BRPM | SYSTOLIC BLOOD PRESSURE: 112 MMHG | HEART RATE: 66 BPM

## 2021-04-24 LAB
A1C WITH ESTIMATED AVERAGE GLUCOSE RESULT: 5.4 % — SIGNIFICANT CHANGE UP (ref 4–5.6)
ALBUMIN SERPL ELPH-MCNC: 3.5 G/DL — SIGNIFICANT CHANGE UP (ref 3.3–5)
ALP SERPL-CCNC: 91 U/L — SIGNIFICANT CHANGE UP (ref 40–120)
ALT FLD-CCNC: 19 U/L — SIGNIFICANT CHANGE UP (ref 10–45)
ANION GAP SERPL CALC-SCNC: 8 MMOL/L — SIGNIFICANT CHANGE UP (ref 5–17)
AST SERPL-CCNC: 20 U/L — SIGNIFICANT CHANGE UP (ref 10–40)
BACTERIA # UR AUTO: NEGATIVE /HPF — SIGNIFICANT CHANGE UP
BILIRUB SERPL-MCNC: 0.4 MG/DL — SIGNIFICANT CHANGE UP (ref 0.2–1.2)
BUN SERPL-MCNC: 17 MG/DL — SIGNIFICANT CHANGE UP (ref 7–23)
CALCIUM SERPL-MCNC: 9.8 MG/DL — SIGNIFICANT CHANGE UP (ref 8.4–10.5)
CHLORIDE SERPL-SCNC: 106 MMOL/L — SIGNIFICANT CHANGE UP (ref 96–108)
CHOLEST SERPL-MCNC: 135 MG/DL — SIGNIFICANT CHANGE UP
CO2 SERPL-SCNC: 27 MMOL/L — SIGNIFICANT CHANGE UP (ref 22–31)
COVID-19 SPIKE DOMAIN AB INTERP: POSITIVE
COVID-19 SPIKE DOMAIN ANTIBODY RESULT: 29.7 U/ML — HIGH
CREAT SERPL-MCNC: 1.01 MG/DL — SIGNIFICANT CHANGE UP (ref 0.5–1.3)
EPI CELLS # UR: SIGNIFICANT CHANGE UP
ESTIMATED AVERAGE GLUCOSE: 108 MG/DL — SIGNIFICANT CHANGE UP (ref 68–114)
GLUCOSE SERPL-MCNC: 97 MG/DL — SIGNIFICANT CHANGE UP (ref 70–99)
HCT VFR BLD CALC: 33.5 % — LOW (ref 34.5–45)
HDLC SERPL-MCNC: 41 MG/DL — LOW
HGB BLD-MCNC: 11.4 G/DL — LOW (ref 11.5–15.5)
LIPID PNL WITH DIRECT LDL SERPL: 81 MG/DL — SIGNIFICANT CHANGE UP
MCHC RBC-ENTMCNC: 30.9 PG — SIGNIFICANT CHANGE UP (ref 27–34)
MCHC RBC-ENTMCNC: 34 GM/DL — SIGNIFICANT CHANGE UP (ref 32–36)
MCV RBC AUTO: 90.8 FL — SIGNIFICANT CHANGE UP (ref 80–100)
NON HDL CHOLESTEROL: 94 MG/DL — SIGNIFICANT CHANGE UP
NRBC # BLD: 0 /100 WBCS — SIGNIFICANT CHANGE UP (ref 0–0)
PLATELET # BLD AUTO: 334 K/UL — SIGNIFICANT CHANGE UP (ref 150–400)
POTASSIUM SERPL-MCNC: 3.8 MMOL/L — SIGNIFICANT CHANGE UP (ref 3.5–5.3)
POTASSIUM SERPL-SCNC: 3.8 MMOL/L — SIGNIFICANT CHANGE UP (ref 3.5–5.3)
PROT SERPL-MCNC: 7.2 G/DL — SIGNIFICANT CHANGE UP (ref 6–8.3)
RBC # BLD: 3.69 M/UL — LOW (ref 3.8–5.2)
RBC # FLD: 13.2 % — SIGNIFICANT CHANGE UP (ref 10.3–14.5)
RBC CASTS # UR COMP ASSIST: NEGATIVE /HPF — SIGNIFICANT CHANGE UP (ref 0–4)
SARS-COV-2 IGG+IGM SERPL QL IA: 29.7 U/ML — HIGH
SARS-COV-2 IGG+IGM SERPL QL IA: POSITIVE
SODIUM SERPL-SCNC: 141 MMOL/L — SIGNIFICANT CHANGE UP (ref 135–145)
T4 AB SER-ACNC: 6.4 UG/DL — SIGNIFICANT CHANGE UP (ref 4.6–12)
TRIGL SERPL-MCNC: 66 MG/DL — SIGNIFICANT CHANGE UP
TSH SERPL-MCNC: 0.54 UIU/ML — SIGNIFICANT CHANGE UP (ref 0.27–4.2)
VIT B12 SERPL-MCNC: 920 PG/ML — SIGNIFICANT CHANGE UP (ref 232–1245)
WBC # BLD: 12 K/UL — HIGH (ref 3.8–10.5)
WBC # FLD AUTO: 12 K/UL — HIGH (ref 3.8–10.5)
WBC UR QL: SIGNIFICANT CHANGE UP /HPF (ref 0–5)

## 2021-04-24 PROCEDURE — 93880 EXTRACRANIAL BILAT STUDY: CPT | Mod: 26

## 2021-04-24 PROCEDURE — 84484 ASSAY OF TROPONIN QUANT: CPT

## 2021-04-24 PROCEDURE — 86780 TREPONEMA PALLIDUM: CPT

## 2021-04-24 PROCEDURE — 84436 ASSAY OF TOTAL THYROXINE: CPT

## 2021-04-24 PROCEDURE — 86769 SARS-COV-2 COVID-19 ANTIBODY: CPT

## 2021-04-24 PROCEDURE — 80061 LIPID PANEL: CPT

## 2021-04-24 PROCEDURE — 71045 X-RAY EXAM CHEST 1 VIEW: CPT

## 2021-04-24 PROCEDURE — 93306 TTE W/DOPPLER COMPLETE: CPT

## 2021-04-24 PROCEDURE — 81001 URINALYSIS AUTO W/SCOPE: CPT

## 2021-04-24 PROCEDURE — 99239 HOSP IP/OBS DSCHRG MGMT >30: CPT

## 2021-04-24 PROCEDURE — 70496 CT ANGIOGRAPHY HEAD: CPT

## 2021-04-24 PROCEDURE — 93880 EXTRACRANIAL BILAT STUDY: CPT

## 2021-04-24 PROCEDURE — 36415 COLL VENOUS BLD VENIPUNCTURE: CPT

## 2021-04-24 PROCEDURE — 85027 COMPLETE CBC AUTOMATED: CPT

## 2021-04-24 PROCEDURE — 84443 ASSAY THYROID STIM HORMONE: CPT

## 2021-04-24 PROCEDURE — 82803 BLOOD GASES ANY COMBINATION: CPT

## 2021-04-24 PROCEDURE — 99285 EMERGENCY DEPT VISIT HI MDM: CPT | Mod: 25

## 2021-04-24 PROCEDURE — 93005 ELECTROCARDIOGRAM TRACING: CPT

## 2021-04-24 PROCEDURE — 80053 COMPREHEN METABOLIC PANEL: CPT

## 2021-04-24 PROCEDURE — 82607 VITAMIN B-12: CPT

## 2021-04-24 PROCEDURE — 36000 PLACE NEEDLE IN VEIN: CPT

## 2021-04-24 PROCEDURE — 85025 COMPLETE CBC W/AUTO DIFF WBC: CPT

## 2021-04-24 PROCEDURE — 82962 GLUCOSE BLOOD TEST: CPT

## 2021-04-24 PROCEDURE — 70498 CT ANGIOGRAPHY NECK: CPT

## 2021-04-24 PROCEDURE — 93306 TTE W/DOPPLER COMPLETE: CPT | Mod: 26

## 2021-04-24 PROCEDURE — 70450 CT HEAD/BRAIN W/O DYE: CPT

## 2021-04-24 PROCEDURE — 97162 PT EVAL MOD COMPLEX 30 MIN: CPT

## 2021-04-24 PROCEDURE — 85610 PROTHROMBIN TIME: CPT

## 2021-04-24 PROCEDURE — 0042T: CPT

## 2021-04-24 PROCEDURE — 85730 THROMBOPLASTIN TIME PARTIAL: CPT

## 2021-04-24 PROCEDURE — 83036 HEMOGLOBIN GLYCOSYLATED A1C: CPT

## 2021-04-24 PROCEDURE — 80307 DRUG TEST PRSMV CHEM ANLYZR: CPT

## 2021-04-24 PROCEDURE — 87635 SARS-COV-2 COVID-19 AMP PRB: CPT

## 2021-04-24 PROCEDURE — 99222 1ST HOSP IP/OBS MODERATE 55: CPT

## 2021-04-24 RX ORDER — ATORVASTATIN CALCIUM 80 MG/1
1 TABLET, FILM COATED ORAL
Qty: 30 | Refills: 0
Start: 2021-04-24 | End: 2021-05-23

## 2021-04-24 RX ORDER — SIMVASTATIN 20 MG/1
1 TABLET, FILM COATED ORAL
Qty: 0 | Refills: 0 | DISCHARGE

## 2021-04-24 RX ORDER — ASPIRIN/CALCIUM CARB/MAGNESIUM 324 MG
1 TABLET ORAL
Qty: 0 | Refills: 0 | DISCHARGE
Start: 2021-04-24

## 2021-04-24 RX ADMIN — ESCITALOPRAM OXALATE 10 MILLIGRAM(S): 10 TABLET, FILM COATED ORAL at 15:18

## 2021-04-24 RX ADMIN — Medication 81 MILLIGRAM(S): at 15:13

## 2021-04-24 RX ADMIN — LOSARTAN POTASSIUM 100 MILLIGRAM(S): 100 TABLET, FILM COATED ORAL at 06:21

## 2021-04-24 RX ADMIN — ENOXAPARIN SODIUM 40 MILLIGRAM(S): 100 INJECTION SUBCUTANEOUS at 15:18

## 2021-04-24 RX ADMIN — Medication 25 MILLIGRAM(S): at 06:21

## 2021-04-24 NOTE — DISCHARGE NOTE PROVIDER - CARE PROVIDER_API CALL
Alexey Chavez (MD)  Neurology  [address], [address]  [place], [place] [id number]  Phone: [phone]  Fax: [phone]  Follow Up Time:

## 2021-04-24 NOTE — DISCHARGE NOTE NURSING/CASE MANAGEMENT/SOCIAL WORK - NSDCPEWEB_GEN_ALL_CORE
[facility] for Tobacco Control website --- [email or web address] website --- [email or web address]

## 2021-04-24 NOTE — DISCHARGE NOTE PROVIDER - NSDCFUSCHEDAPPT_GEN_ALL_CORE_FT
FLAKO STUBBS ; 04/27/2021 ; NPP [facility] [address]  FLAKO STUBBS ; 05/04/2021 ; NP Neurology [address]

## 2021-04-24 NOTE — PROGRESS NOTE ADULT - ASSESSMENT
81 yo F with PMHx active smoking, HLD, HTN, depression presents to the ER with aphasia that lasted for an hour. CT head negative for acute stroke. Admitted for suspected TIA.      *Aphasia: - mostly resolved by the time of evaluation   Suspected TIA  CT stroke protcol done in ER--negative for acute stroke  ASA 81 daily  Lipid profile reviewed--Lipitor 4 mg   HgbA1C 5.4  DVT ppx  PT evaluation--no needs   -BP control --cont losartan and HCTZ   -telemetry monitoring to r/o arrhythmia -->SR 65-73  -Repeat CT head if any further changes in neuro status  -Echocardiagram pending  -Carotid sono with no significant stenosis   -Smoking cessation advised--patient reports she quit last week   -Neurology consult- Dr Garcia --patient scheduled for outpatient appt with neuro with Dr Chavez on 5/4    *HTN:  Cont losartan  Cont HCTZ    *HLD:  Lipid profile reviewed  Decrease lipitor 40 mg     *Leukocytosis:  WBC 12  patient with h/o myeloma--gets B12 injections  No signs of infection at this time    *Anemia:  stable  suspect anemia of chronic disease vs patient with known Vit b12 def (although MCV normal)  Cont to monitor     *Depression:  Cont lexapro    *Active smoker:  smoking cessation advised  patient reports she quit last week  Not interested in nicotine patch     DVT ppx: lovenox  Dispo: home today with outpatient follow up pending TTE     DaughterJosy updated at [phone]    Code Status: FULL Code    Case d/w Dr Garcia (neuro)

## 2021-04-24 NOTE — PROGRESS NOTE ADULT - ATTENDING COMMENTS
I have personally seen and examined patient on the above date.  I discussed the case with Floridalma Pritchard and I agree with findings and plan as detailed per note above, which I have amended where appropriate.      Possible TIA with resolution of slurred speech  HTN/HLD  Active tobacco abuse    Stroke protocol in place, neurology recommendations apprecaite  C/w ASA, statin  F/U neurology o/p  TTE pending  c/w current medications  Smoking cessation done 8 min    DVT PPX  DISP: Home if TTE normal

## 2021-04-24 NOTE — DISCHARGE NOTE PROVIDER - NSDCCPCAREPLAN_GEN_ALL_CORE_FT
PRINCIPAL DISCHARGE DIAGNOSIS  Diagnosis: Brain TIA  Assessment and Plan of Treatment: you were diagnosed with a TIA (precursor to stroke)  Brain CT unremarkable  Monitored on telemtery with no acute events  Neurology consulted  Your statin dose was increased  YOu should take Aspirin 81 mg daily  Stop smoking  You had carotid dopplers which were unremarkable.  Echo of the heart was done  You will see neurology, Dr Chavez on 5/4       PRINCIPAL DISCHARGE DIAGNOSIS  Diagnosis: Brain TIA  Assessment and Plan of Treatment: you were diagnosed with a TIA (precursor to stroke)  Brain CT unremarkable  Monitored on telemtery with no acute events  Neurology consulted  Your statin dose was increased  YOu should take Aspirin 81 mg daily  You had carotid dopplers which were unremarkable.  Echo of the heart was done which had Ejection fraction 55-60%, normal Left ventrible function, mild Aortic stenosis   You will see neurology, Dr Chavez on 5/4  You should continue to refrain from smoking

## 2021-04-24 NOTE — PROGRESS NOTE ADULT - SUBJECTIVE AND OBJECTIVE BOX
Patient is a 80y old  Female who presents with a chief complaint of Aphasia (2021 07:04). No acute issues overnight. Eager to go home      Patient seen and examined at bedside.    ALLERGIES:  No Known Allergies    MEDICATIONS  (STANDING):  aspirin enteric coated 81 milliGRAM(s) Oral daily  atorvastatin 40 milliGRAM(s) Oral at bedtime  enoxaparin Injectable 40 milliGRAM(s) SubCutaneous daily  escitalopram 10 milliGRAM(s) Oral daily  hydrochlorothiazide 25 milliGRAM(s) Oral daily  losartan 100 milliGRAM(s) Oral daily    MEDICATIONS  (PRN):    Vital Signs Last 24 Hrs  T(F): 98.8 (2021 08:41), Max: 98.8 (2021 08:41)  HR: 89 (2021 08:41) (64 - 89)  BP: 114/64 (2021 08:41) (105/75 - 137/65)  RR: 16 (2021 08:41) (13 - 18)  SpO2: 98% (2021 08:41) (97% - 99%)  I&O's Summary    BMI (kg/m2): 21.5 (21 @ 22:08)  PHYSICAL EXAM:  General: NAD, A/O x 3  ENT: MMM, no thrush  Neck: Supple, No JVD  Lungs: Non labored breathing,  Clear to auscultation bilaterally,   Cardio: RRR, S1/S2, no pitting edema bilaterally  Abdomen: Soft, Nontender, Nondistended; Bowel sounds present  Extremities: No calf tenderness, moves all extremities    LABS:                        11.4   12.00 )-----------( 334      ( 2021 05:00 )             33.5           141  |  106  |  17  ----------------------------<  97  3.8   |  27  |  1.01    Ca    9.8      2021 05:00    TPro  7.2  /  Alb  3.5  /  TBili  0.4  /  DBili  x   /  AST  20  /  ALT  19  /  AlkPhos  91       eGFR if Non African American: 53 mL/min/1.73M2 (21 @ 05:00)  eGFR if : 62 mL/min/1.73M2 (21 @ 05:00)    PT/INR - ( 2021 16:15 )   PT: 11.9 sec;   INR: 0.98 ratio         PTT - ( 2021 16:15 )  PTT:30.4 sec     CARDIAC MARKERS ( 2021 16:15 )  <.017 ng/mL / x     / x     / x     / x          04-24 Chol 135 mg/dL LDL -- HDL 41 mg/dL Trig 66 mg/dL    16:15 - VBG - pH: 7.39  | pCO2: 42    | pO2: 30    | Lactate:                  POCT Blood Glucose.: 90 mg/dL (2021 16:04)      Urinalysis Basic - ( 2021 16:02 )    Color: Yellow / Appearance: Clear / S.010 / pH: x  Gluc: x / Ketone: Negative  / Bili: Negative / Urobili: Negative   Blood: x / Protein: 15 / Nitrite: Negative   Leuk Esterase: Trace / RBC: Negative /HPF / WBC 0-2 /HPF   Sq Epi: x / Non Sq Epi: Neg.-Few / Bacteria: Negative /HPF          RADIOLOGY & ADDITIONAL TESTS:    Care Discussed with Consultants/Other Providers:

## 2021-04-24 NOTE — DISCHARGE NOTE PROVIDER - NSDCMRMEDTOKEN_GEN_ALL_CORE_FT
hydroCHLOROthiazide 25 mg oral tablet: 1 tab(s) orally once a day  Lexapro 10 mg oral tablet: 1 tab(s) orally once a day (at bedtime)  losartan 100 mg oral tablet: 1 tab(s) orally once a day  Zocor 10 mg oral tablet: 1 tab(s) orally once a day (at bedtime)   aspirin 81 mg oral delayed release tablet: 1 tab(s) orally once a day  atorvastatin 40 mg oral tablet: 1 tab(s) orally once a day (at bedtime)  hydroCHLOROthiazide 25 mg oral tablet: 1 tab(s) orally once a day  Lexapro 10 mg oral tablet: 1 tab(s) orally once a day (at bedtime)  losartan 100 mg oral tablet: 1 tab(s) orally once a day

## 2021-04-24 NOTE — DISCHARGE NOTE PROVIDER - HOSPITAL COURSE
80-year-old woman who is a current smoker, hyperlipidemia, hypertension and depression presents to the ER with word finding difficulty that lasted an hour around 3:00 on April 23.  Patient was accompanied by daughter who states that she had difficulty finding the right words to say.  Patient reports that her speech was intermittent and she was brought to the ER.  Patient had a fall last Wednesday and had a CAT scan of the head on April 19 which showed old thalamic and basal ganglia infarct. Symptoms suspected to be due to TIA. Continued on ASA. Statin increased.  Monitored on tele w no acute events. TTE revealed ________. Carotid sono unremarkable. Neuro consulted during hospital course  She has upcoming appt with Dr. Chavez on 5/4  Discharged home w daughter.   80-year-old woman who is a current smoker, hyperlipidemia, hypertension and depression presents to the ER with word finding difficulty that lasted an hour around 3:00 on April 23.  Patient was accompanied by daughter who states that she had difficulty finding the right words to say.  Patient reports that her speech was intermittent and she was brought to the ER.  Patient had a fall last Wednesday and had a CAT scan of the head on April 19 which showed old thalamic and basal ganglia infarct. Symptoms suspected to be due to TIA. Continued on ASA. Statin increased.  Monitored on tele w no acute events. TTE revealed E  55-60%, nml LV function, grade I DD, mild AS.  Carotid sono unremarkable. Neuro consulted during hospital course  She has upcoming appt with Dr. Chavez on 5/4  Discharged home w daughter.

## 2021-04-24 NOTE — DISCHARGE NOTE NURSING/CASE MANAGEMENT/SOCIAL WORK - PATIENT PORTAL LINK FT
You can access the FollowMyHealth Patient Portal offered by [facility] by registering at the following website: [email or web address]. By joining [employer]’s FollowMyHealth portal, you will also be able to view your health information using other applications (apps) compatible with our system.

## 2021-04-24 NOTE — PHYSICAL THERAPY INITIAL EVALUATION ADULT - PERTINENT HX OF CURRENT PROBLEM, REHAB EVAL
79 yo F with PMHx active smoking, HLD, HTN, depression presents to the ER with difficulty finding words that started around 3:30 PM this afternoon and lasted about an hour. Patient is with daughter at bedside and daughter reports they were both shopping at [employer] when the patient had difficulty finding the "right" words to say.  The daughter reports at times, her speech was gargled  She reports that she immediately brought her to the ER.

## 2021-04-24 NOTE — DISCHARGE NOTE NURSING/CASE MANAGEMENT/SOCIAL WORK - NSDCPEEMAIL_GEN_ALL_CORE
[facility] for Tobacco Control email [email or web address]

## 2021-04-24 NOTE — DISCHARGE NOTE PROVIDER - NSDCQMSTROKERISK_NEU_ALL_CORE
History of a stroke or TIA/Tobacco use High blood pressure/High cholesterol/History of a stroke or TIA/Tobacco use

## 2021-04-24 NOTE — CONSULT NOTE ADULT - SUBJECTIVE AND OBJECTIVE BOX
History from patient and PA's note    80-year-old woman who is a current smoker, hyperlipidemia, hypertension and depression presents to the ER with word finding difficulty that lasted an hour around 3:00 on April 23.  Patient was accompanied by daughter who states that she had difficulty finding the right words to say.  Patient reports that her speech was intermittent and she was brought to the ER.  Patient had a fall last Wednesday and had a CAT scan of the head on April 19 which showed old thalamic and basal ganglia infarct. She has upcoming appt with Dr. Chavez    MEDICATIONS  (STANDING):  aspirin enteric coated 81 milliGRAM(s) Oral daily  atorvastatin 40 milliGRAM(s) Oral at bedtime  enoxaparin Injectable 40 milliGRAM(s) SubCutaneous daily  escitalopram 10 milliGRAM(s) Oral daily  hydrochlorothiazide 25 milliGRAM(s) Oral daily  losartan 100 milliGRAM(s) Oral daily    MEDICATIONS  (PRN):      CTA of the head shows no significant stenosis.  CT angio of the vertebral artery shows no stenosis.  CT perfusion was normal.  Incidentally there is mild ascending aortic aneurysm measuring 3.5 cm in greatest diameter    WBC of 12 chemistry normal  UA with trace leuko esterase    Alert and oriented x3, answers questions appropriately, speech fluent language intact.  No word finding difficulties at this time  PERRL, EOMI, V1 to V3 was intact, face symmetric, tongue uvula and palate were midline  Motor no pronator drift, able to lift both legs against gravity  Sensation intact to light touch    80-year-old woman who likely had a TIA localized to the inferior frontal lobe.  Continue aspirin 81 and Lipitor 80 mg.  Please check lipid and hemoglobin A1c along with B12, folate, TSH, T4, RPR patient may need ILR placement as outpt to evaluate for paroxysmal atrial fibrillation if telemetry monitoring is negative for any arrhythmias.  Will discuss with primary team

## 2021-04-25 LAB — T PALLIDUM AB TITR SER: NEGATIVE — SIGNIFICANT CHANGE UP

## 2021-04-27 ENCOUNTER — APPOINTMENT (OUTPATIENT)
Dept: THORACIC SURGERY | Facility: CLINIC | Age: 81
End: 2021-04-27
Payer: MEDICARE

## 2021-04-27 PROCEDURE — 99214 OFFICE O/P EST MOD 30 MIN: CPT | Mod: 95

## 2021-04-27 NOTE — ASSESSMENT
[FreeTextEntry1] : Ms. FLAKO STUBBS is a 80 year old female, S/P FB, uniportal Left VATS, SERA wedge resection x1, completion LULobectomy, MLND on 8/28/19. Pathology revealed lung T2N0 adenocarcinoma, solid, cribriform and acinar patterns. Postoperatively she had increased atelectasis and opacification on CXR and had a Flexible bronchoscopy and BAL on 8/31/19. BAL confirmed pan sensitive pseudomonas PNA. ID was consulted and she was started on antibiotics. She then developed RAUL and was seen by nephrology. ABX changed to renal dosed Cipro. \par \par I have reviewed the patient's medical records and diagnostic images at time of this office consultation and have made the following recommendation:\par 1. CT chest reviewed and explained to patient, stable exam. I recommended patient to return to office in 3 months with CT Chest without contrast.\par \par I personally performed the services described in the documentation, reviewed the documentation recorded by the scribe in my presence and it accurately and completely records my words and actions.\par \par DEEP, Charisse Branch NP, am scribing for and the presence of ROGER Wilcox, the following sections HISTORY OF PRESENT ILLNESS, PAST MEDICAL/FAMILY/SOCIAL HISTORY; REVIEW OF SYSTEMS; VITAL SIGNS; PHYSICAL EXAM; DISPOSITION.

## 2021-04-27 NOTE — CONSULT LETTER
[Dear  ___] : Dear  [unfilled], [Consult Letter:] : I had the pleasure of evaluating your patient, [unfilled]. [( Thank you for referring [unfilled] for consultation for _____ )] : Thank you for referring [unfilled] for consultation for [unfilled] [Please see my note below.] : Please see my note below. [Consult Closing:] : Thank you very much for allowing me to participate in the care of this patient.  If you have any questions, please do not hesitate to contact me. [Sincerely,] : Sincerely, [DrHector  ___] : Dr. CARREON [DrHector ___] : Dr. CARREON [FreeTextEntry2] : Dr. Szymanski (Pulm/Ref)\par Dr. Fadia Hampton ((PCP)\par Dr. Hancock (Onc)  [FreeTextEntry3] : Michael Brooks MD, FACS \par Chief, Division of Thoracic Surgery \par Director, Minimally Invasive Thoracic Surgery \par Department of Cardiovascular and Thoracic Surgery \par [facility] \par , Cardiovascular and Thoracic Surgery\par \par

## 2021-04-27 NOTE — DATA REVIEWED
[FreeTextEntry1] : I have independently reviewed patient's CT on 04/19/2021: \par \par CT chest on 04/19/2021:\par - post-op changes\par - 8 mm ill-defined groundglass opacity within the left lower lobe (series 3 image 86) is unchanged. 8 mm subpleural nodular opacity within the right apex (series 3 image 16) likely small focus of pleuroparenchymal scarring is unchanged.5 mm right upper lobe nodule (series 3 image 31) is unchanged. Another 2 mm right upper lobe nodule (series 3 image 40) is also stable.\par - There are other centrilobular micronodules likely impacted distal airways in the setting of COPD which are unchanged. The lungs are otherwise clear. Previously mentioned 3 mm left lower lobe nodule and "lucency with wall thickening" within left lower lobe are not identified on this exam.\par - Small right renal cyst is unchanged. Stable 3 mm nonobstructing left renal stone. Partially imaged herniation of the retroperitoneal fat through the left posterolateral chest wall is again noted.

## 2021-04-27 NOTE — HISTORY OF PRESENT ILLNESS
[Home] : at home, [unfilled] , at the time of the visit. [Medical Office: ([facility])___] : at the medical office located in  [Verbal consent obtained from patient] : the patient, [unfilled] [FreeTextEntry1] : Ms. FLAKO STUBBS is a 80 year old female, S/P FB, uniportal Left VATS, SERA wedge resection x1, completion LULobectomy, MLND on 8/28/19. Pathology revealed lung T2N0 adenocarcinoma, solid, cribriform and acinar patterns. Postoperatively she had increased atelectasis and opacification on CXR and had a Flexible bronchoscopy and BAL on 8/31/19. BAL confirmed pan sensitive pseudomonas PNA. ID was consulted and she was started on antibiotics. She then developed RAUL and was seen by nephrology. ABX changed to renal dosed Cipro. \par \par She is a former smoker (60 PYHx, Quit 7/2019) and PMHx includes HTN, HLD, Asthma, COPD and smoldering myeloma and is followed by Dr. Hancock.\par \par Patient evaluated by Dr. Hancock no adjuvant therapy for lung but patient was referred to [facility] for further workup of MM.\par \par CT chest 05/26/2020:\par - post-op changes\par - 0.4 cm lucency with thickening of its wall in the LLL is slightly more prominent when compared to previous exam\par - several ill-defined opacities within the right lung are unchanged when compared to previous exam.\par - 2.1 cm low-attenuation lesion in the right kidney has increased in size when compared to previous exam. \par \par CT chest on 11/11/2020:\par - emphysema\par - 3 mm LLL nodule (2: 28) with slight interval increase in conspicuity may represent a focus of mucoid impacted distal airway\par - previously 4 mm lucency within the LLL with mild peripheral wall thickening is unchanged\par - several other bilateral small pulmonary nodules or nodular opacities with the largest in the RUL measuring about 5 mm (3: 33) are otherwise unchanged\par - hypodensity within the upper pole of the partially imaged right kidney unchanged. Partially imaged herniation of the retroperitoneal fat through the left posterolateral chest wall is again noted as on the prior study. \par \par CT chest on 04/19/2021:\par - post-op changes\par - 8 mm ill-defined groundglass opacity within the left lower lobe (series 3 image 86) is unchanged. 8 mm subpleural nodular opacity within the right apex (series 3 image 16) likely small focus of pleuroparenchymal scarring is unchanged.5 mm right upper lobe nodule (series 3 image 31) is unchanged. Another 2 mm right upper lobe nodule (series 3 image 40) is also stable.\par - There are other centrilobular micronodules likely impacted distal airways in the setting of COPD which are unchanged. The lungs are otherwise clear. Previously mentioned 3 mm left lower lobe nodule and "lucency with wall thickening" within left lower lobe are not identified on this exam.\par - Small right renal cyst is unchanged. Stable 3 mm nonobstructing left renal stone. 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 Chavez on 05/04/2021. \par \par Patient is followed today via Telehealth visit. Patient denies shortness of breath, cough, chest pain, fever, chills. \par

## 2021-05-04 ENCOUNTER — APPOINTMENT (OUTPATIENT)
Dept: NEUROLOGY | Facility: CLINIC | Age: 81
End: 2021-05-04
Payer: MEDICARE

## 2021-05-04 VITALS — SYSTOLIC BLOOD PRESSURE: 136 MMHG | HEART RATE: 84 BPM | DIASTOLIC BLOOD PRESSURE: 74 MMHG

## 2021-05-04 PROCEDURE — 99215 OFFICE O/P EST HI 40 MIN: CPT

## 2021-05-06 ENCOUNTER — APPOINTMENT (OUTPATIENT)
Dept: CARDIOLOGY | Facility: CLINIC | Age: 81
End: 2021-05-06
Payer: MEDICARE

## 2021-05-06 VITALS — HEART RATE: 72 BPM | SYSTOLIC BLOOD PRESSURE: 130 MMHG | DIASTOLIC BLOOD PRESSURE: 60 MMHG

## 2021-05-06 VITALS
RESPIRATION RATE: 17 BRPM | HEIGHT: 64 IN | OXYGEN SATURATION: 99 % | SYSTOLIC BLOOD PRESSURE: 136 MMHG | DIASTOLIC BLOOD PRESSURE: 74 MMHG | BODY MASS INDEX: 22.2 KG/M2 | WEIGHT: 130 LBS | HEART RATE: 81 BPM

## 2021-05-06 PROCEDURE — 93246 EXT ECG>7D<15D RECORDING: CPT

## 2021-05-06 PROCEDURE — 93000 ELECTROCARDIOGRAM COMPLETE: CPT | Mod: 59

## 2021-05-06 PROCEDURE — 99214 OFFICE O/P EST MOD 30 MIN: CPT | Mod: 25

## 2021-05-06 NOTE — PHYSICAL EXAM
[General Appearance - Well Developed] : well developed [Normal Appearance] : normal appearance [Well Groomed] : well groomed [General Appearance - Well Nourished] : well nourished [No Deformities] : no deformities [General Appearance - In No Acute Distress] : no acute distress [Normal Jugular Venous A Waves Present] : normal jugular venous A waves present [Normal Jugular Venous V Waves Present] : normal jugular venous V waves present [No Jugular Venous Funez A Waves] : no jugular venous funez A waves [Normal Rate] : normal [Normal S1] : normal S1 [Normal S2] : normal S2 [S3] : no S3 [S4] : no S4 [No Murmur] : no murmurs heard [Right Carotid Bruit] : no bruit heard over the right carotid [Left Carotid Bruit] : no bruit heard over the left carotid [Right Femoral Bruit] : no bruit heard over the right femoral artery [Left Femoral Bruit] : no bruit heard over the left femoral artery [2+] : left 2+ [No Abnormalities] : the abdominal aorta was not enlarged and no bruit was heard [No Pitting Edema] : no pitting edema present [Respiration, Rhythm And Depth] : normal respiratory rhythm and effort [Exaggerated Use Of Accessory Muscles For Inspiration] : no accessory muscle use [Auscultation Breath Sounds / Voice Sounds] : lungs were clear to auscultation bilaterally [Abdomen Soft] : soft [Abdomen Tenderness] : non-tender [Abdomen Mass (___ Cm)] : no abdominal mass palpated [Abnormal Walk] : normal gait [Gait - Sufficient For Exercise Testing] : the gait was sufficient for exercise testing [Nail Clubbing] : no clubbing of the fingernails [Cyanosis, Localized] : no localized cyanosis [Petechial Hemorrhages (___cm)] : no petechial hemorrhages [Skin Color & Pigmentation] : normal skin color and pigmentation [] : no rash [No Venous Stasis] : no venous stasis [Skin Lesions] : no skin lesions [No Skin Ulcers] : no skin ulcer [No Xanthoma] : no  xanthoma was observed [Oriented To Time, Place, And Person] : oriented to person, place, and time [Affect] : the affect was normal [Mood] : the mood was normal [No Anxiety] : not feeling anxious

## 2021-05-06 NOTE — ASSESSMENT
[FreeTextEntry1] : Impression:\par 1. Cryptogenic stroke in a patient with well-controlled hypertension and history of lung cancer.\par \par Echocardiogram unrevealing in the hospital\par \par I have discussed I LR with the patient but she wishes to defer at this time and prefers a two-week monitor to see if any atrial fibrillation is picked up.\par \par Will reassess after this is done. Risks and benefits explained to patient and daughter

## 2021-05-06 NOTE — REASON FOR VISIT
[Other: ____] : [unfilled] [FreeTextEntry1] : She presents referred by Dr. Chavez. She recently presented to the hospital with a transient ischemic attack. On CAT scan an old thalamic infarct was found as well. She had not been on aspirin prior to the event but has been on 81 mg since. Of note is the fact that the patient had surgery in 2019 for lung cancer. She did not require radiation or chemotherapy. She feels well at this time. She offers no complaints of chest discomfort shortness of breath palpitations dizziness or syncope.

## 2021-05-07 ENCOUNTER — OUTPATIENT (OUTPATIENT)
Dept: OUTPATIENT SERVICES | Facility: HOSPITAL | Age: 81
LOS: 1 days | End: 2021-05-07
Payer: MEDICARE

## 2021-05-07 ENCOUNTER — APPOINTMENT (OUTPATIENT)
Dept: MRI IMAGING | Facility: HOSPITAL | Age: 81
End: 2021-05-07
Payer: MEDICARE

## 2021-05-07 DIAGNOSIS — G45.9 TRANSIENT CEREBRAL ISCHEMIC ATTACK, UNSPECIFIED: ICD-10-CM

## 2021-05-07 DIAGNOSIS — Z90.2 ACQUIRED ABSENCE OF LUNG [PART OF]: Chronic | ICD-10-CM

## 2021-05-07 PROCEDURE — G1004: CPT

## 2021-05-07 PROCEDURE — 70551 MRI BRAIN STEM W/O DYE: CPT | Mod: 26,ME

## 2021-05-07 PROCEDURE — 70551 MRI BRAIN STEM W/O DYE: CPT

## 2021-06-04 ENCOUNTER — APPOINTMENT (OUTPATIENT)
Dept: NEUROLOGY | Facility: CLINIC | Age: 81
End: 2021-06-04
Payer: MEDICARE

## 2021-06-04 VITALS
HEIGHT: 64 IN | HEART RATE: 70 BPM | SYSTOLIC BLOOD PRESSURE: 134 MMHG | BODY MASS INDEX: 22.2 KG/M2 | DIASTOLIC BLOOD PRESSURE: 78 MMHG | WEIGHT: 130 LBS

## 2021-06-04 VITALS
BODY MASS INDEX: 22.2 KG/M2 | WEIGHT: 130 LBS | HEIGHT: 64 IN | SYSTOLIC BLOOD PRESSURE: 134 MMHG | HEART RATE: 70 BPM | DIASTOLIC BLOOD PRESSURE: 78 MMHG

## 2021-06-04 PROCEDURE — 99215 OFFICE O/P EST HI 40 MIN: CPT

## 2021-06-04 RX ORDER — HYDROCHLOROTHIAZIDE 12.5 MG/1
CAPSULE ORAL
Refills: 0 | Status: ACTIVE | COMMUNITY

## 2021-06-04 RX ORDER — LOSARTAN POTASSIUM 100 MG/1
100 TABLET, FILM COATED ORAL
Refills: 0 | Status: ACTIVE | COMMUNITY

## 2021-06-04 RX ORDER — ATORVASTATIN CALCIUM 40 MG/1
40 TABLET, FILM COATED ORAL
Refills: 0 | Status: ACTIVE | COMMUNITY

## 2021-06-04 NOTE — HISTORY OF PRESENT ILLNESS
[FreeTextEntry1] : Nito Hung was seen for an office consultation with her daughter.  This patient is a 80-year-old right-handed woman with a history of hypertension and hyperlipidemia and cigarette smoking who states that proximately a month ago she collapsed while getting her newspaper at home.  There was no alteration in consciousness.  Her gait was subsequently transiently unsteady for short period of time.  There was no dizziness headache or neurological focality.  She was stable until 4/23/2021 when she had an episode of garbled speech witnessed by her daughter consistent with aphasia which resolved after 45 minutes . She was admitted to [facility].  CAT scan of the brain noncontrast revealed an old right thalamic lacune and small vessel change.  There was no acute process.  CT angiogram of the head and neck was unremarkable for any stenosis.  She was monitored on telemetry without arrhythmia.  She was started on aspirin and her atorvastatin was increased to 80 mg daily.  She did stop smoking after the initial episode of imbalance.\par \par She denies any cardiac problems.  Not have palpitation or chest pain.\par \par There is a past history of lung carcinoma status post surgery 2019 without recurrence.

## 2021-06-04 NOTE — ASSESSMENT
[FreeTextEntry1] : Impression: This patient is a 80-year-old woman with hypertension hyperlipidemia status post surgery for lung cancer 2019 cigarette smoker recently stopped with 2 episodes testing transient ischemic attack.  Brain MRI performed on 5/7/2021 does reveal a tiny subacute lacunar infarction in the right posterior centrum semiovale in addition to chronic microvascular change.  There may also be a right occipital tiny arteriovenous malformation which would be considered incidental.  Therefore this patient who presented with TIA did have a small stroke.  Etiology would be microvascular disease versus cardioembolic events.\par \par Recommendations: This patient did see the cardiologist and has had ambulatory heart monitoring for 2 weeks.  Those results are pending.  If those results are nondiagnostic I would favor implantable loop recorder.  Continue aspirin statin and antihypertensives and smoking cessation for vascular risk factor reduction.  Office follow-up as directed.  Cardiology follow-up suggested.\par

## 2021-06-04 NOTE — PHYSICAL EXAM
[FreeTextEntry1] : Head:  Normocephalic Neck: Supple nontender no carotid bruits.  .\par \par Mental Status:  Alert Oriented X3 Speech normal and no aphasia or dysarthria.\par \par Cranial Nerves:  PERRL, Fundi normal Visual Fields full  EOMI no diplopia no ptosis no nystagmus, V through XII intact.\par \par Motor:  No drift, normal strength tone and coordination and no focal atrophy. No abnormal movements. No dysmetria.  Normal rapid alternating movements. \par \par DTRs: Symmetric and 2+.  Plantars flexor.  No Clonus.\par \par Sensory:  Normal testing with pin light touch and vibration and  Joint position sense.  Normal DSS to touch.\par \par Gait:  Normal including tandem walking heel toe walking and Rhomberg.\par

## 2021-06-04 NOTE — REASON FOR VISIT
[Initial Evaluation] : an initial evaluation [FreeTextEntry1] : Episodes of imbalance and aphasia status post hospitalization 4/23/2021

## 2021-06-04 NOTE — HISTORY OF PRESENT ILLNESS
[FreeTextEntry1] : Nito Hung is seen for an office visit.  Since her last visit she had an MRI of the brain on 4/23/2021 performed which revealed a tiny subacute lacunar infarction in the right posterior centrum semiovale.  There is also a tiny serpentine focus of low susceptibility weighted signal in the right occipital lobe.  She is neurologically asymptomatic without recurrent symptoms to suggest TIA or CVA.  She did see Dr. Bates cardiologist and she did complete a 2-week ambulatory heart monitor.  Those results are not available.  She is taking aspirin 81 mg along with atorvastatin and medication for hypertension.  She is no longer smoking.\par \par

## 2021-06-04 NOTE — ASSESSMENT
[FreeTextEntry1] : Impression: This patient is a 80-year-old woman with hypertension hyperlipidemia status post surgery for lung cancer 2019 cigarette smoker who presents with 2 episodes suggestive of transient ischemic attack.  The first episode of imbalance suggests posterior circulation involvement and is difficult to localize.  The second episode of aphasia suggest left MCA involvement.  CT angiogram of the head and neck is negative.  CAT scan of the brain reveales an old thalamic lacunar and small vessel disease.  I would be concerned about cardioembolic events.  Rule out underlying atrial fibrillation.  Doubt small vessel disease as the etiology.\par \par Recommendations: Vascular risk factor reduction.  Smoking cessation.  MRI brain. Agree with aspirin 81 mg daily and atorvastatin 80 mg daily and blood pressure control.  Cardiology consult to rule out cardioembolic events.  Suggest ILR.  Neurological follow-up as directed.\par

## 2021-06-10 ENCOUNTER — NON-APPOINTMENT (OUTPATIENT)
Age: 81
End: 2021-06-10

## 2021-06-10 PROCEDURE — 93248 EXT ECG>7D<15D REV&INTERPJ: CPT

## 2021-06-23 ENCOUNTER — NON-APPOINTMENT (OUTPATIENT)
Age: 81
End: 2021-06-23

## 2021-06-29 ENCOUNTER — OUTPATIENT (OUTPATIENT)
Dept: OUTPATIENT SERVICES | Facility: HOSPITAL | Age: 81
LOS: 1 days | End: 2021-06-29
Payer: MEDICARE

## 2021-06-29 VITALS
TEMPERATURE: 98 F | HEART RATE: 70 BPM | HEIGHT: 65 IN | WEIGHT: 130.07 LBS | DIASTOLIC BLOOD PRESSURE: 59 MMHG | SYSTOLIC BLOOD PRESSURE: 110 MMHG | OXYGEN SATURATION: 95 % | RESPIRATION RATE: 16 BRPM

## 2021-06-29 VITALS — OXYGEN SATURATION: 98 % | RESPIRATION RATE: 21 BRPM | HEART RATE: 74 BPM

## 2021-06-29 DIAGNOSIS — G45.9 TRANSIENT CEREBRAL ISCHEMIC ATTACK, UNSPECIFIED: ICD-10-CM

## 2021-06-29 DIAGNOSIS — Z90.2 ACQUIRED ABSENCE OF LUNG [PART OF]: Chronic | ICD-10-CM

## 2021-06-29 PROCEDURE — 93005 ELECTROCARDIOGRAM TRACING: CPT

## 2021-06-29 PROCEDURE — C1764: CPT

## 2021-06-29 PROCEDURE — 33285 INSJ SUBQ CAR RHYTHM MNTR: CPT

## 2021-06-29 PROCEDURE — 93010 ELECTROCARDIOGRAM REPORT: CPT

## 2021-06-29 NOTE — H&P CARDIOLOGY - HISTORY OF PRESENT ILLNESS
80 year old female with PMH recent TIA, old CVA, asthma, COPD, lung cancer s/p lobectomy, HTN, presents today for LOOP implant. Patient had recent cryptogenic stroke. ECHO was unrevealing. Referred to EP Dr Liu and LOOP implant advised.  80 year old  female with no implantable cardiac device and  PMH recent TIA, old CVA, former smoker, asthma, COPD, lung cancer s/p left lobectomy, HTN, HLD, presents today for LOOP implant. Patient diagnosed with cryptogenic stroke 4/23/2021. Subsequent ECHO was unrevealing. Referred to EP Dr Liu and LOOP implant advised.  80 year old  female with no implantable cardiac device and  PMH recent TIA, old CVA, former smoker, asthma, COPD, lung cancer s/p left lobectomy, HTN, HLD, presents today for LOOP implant. Patient diagnosed with cryptogenic stroke 4/23/2021. Subsequent ECHO 4/24 showed   1. Left ventricular ejection fraction, by visual estimation, is 55 to 60%.   2. Technically fair study.   3. Normal global left ventricular systolic function.   4. Spectral Doppler shows impaired relaxation pattern of left ventricular myocardial filling (Grade I diastolic dysfunction).   5. Normal left atrial size.   6. Normal right atrial size.   7. Mild mitral annular calcification.   8. Trace mitral valve regurgitation.   9. Sclerotic aortic valve with normal opening.  10. Pulmonary hypertension is absent.  11. LA volume Index is 16.3 ml/m² ml/m2.  12. Peak transaortic gradient equals 8.2 mmHg, mean transaortic gradient equals 4.9 mmHg, the calculated aortic valve area equals 1.73 cm² by the continuity equation consistent with mild aortic stenosis.     Referred to EP Dr Liu and LOOP implant advised.

## 2021-06-29 NOTE — H&P CARDIOLOGY - PRESSURE ULCER(S)
OCCUPATIONAL THERAPY Treatment:    Date: 6/12/2018  Recorded diagnosis/complaint at time of admission:  Active Hospital Problems    Diagnosis Date Noted   • Total knee replacement status, right 06/11/2018     Priority: Low   • Primary osteoarthritis of right knee 01/29/2018     Priority: Low   • GERD (gastroesophageal reflux disease) 01/26/2012     Priority: Low   • Other and unspecified hyperlipidemia 01/26/2012     Priority: Low   • Polymyalgia rheumatica (CMS/HCC) 01/26/2012     Priority: Low     Co-morbidities:   Patient Active Problem List   Diagnosis   • Arrhythmia   • Diverticular disease of colon   • GERD (gastroesophageal reflux disease)   • Other and unspecified hyperlipidemia   • Osteopenia   • Polymyalgia rheumatica (CMS/[facility])   • Syncope and collapse   • Chronic pain of right knee   • Primary osteoarthritis of right knee   • Total knee replacement status, right     Task Modification: clinical decision making of low complexity, no task modification  Treatment Diagnosis: Impaired Range of Motion, Impaired Gait/Locomotion Deficits and Impaired Mobility    Therapy Precautions:  Weight Bearing Status WBAT RLE    Activity: As tolerated    Cognition: Alert and [place] x3      SUBJECTIVE:   Pt. reported agreeable to therapy  Pt's personal goal for therapy: return home    Pain:  At Rest: 6/10  With Activity: 4/10  Intervention: repositioned, ice applied and activity  Location: right, lower extremity     OBSERVATION:   Pt is utilizing Capped IV  Skin Integrity: Intact  Edema: post-surgical right, lower extremity  Collaboration with: Nurse Elizalde      Vital Signs: not warranted at this time    ROM (degrees):  within functional limits    Strength (out of 5 in available AROM):  UE: within functional limits    Neurological:  Coordination: intact  Tone: intact    Balance:   Static Sitting: intact  Dynamic Sitting: intact  Static Standing: SBA for safety   Dynamic Standing: SBA for safety       ADLs:   Toileting:  Not  no addressed this session    Upper Body Bathing:   Not addressed this session    Upper Body Dressing:   Not addressed this session    Lower Body Bathing:   Not addressed this session    Lower Body Dressing:    Not addressed this session    Hygiene/Grooming:   Not addressed this session    Self Feeding:  Patient at baseline status at this time.  OT will not address this area unless change in functional status occurs throughout hospital stay.     ADL Functional Mobility:  Not addressed this session       MOBILITY:    Bed:   Sit to Supine: Stand By Assistance (SBA)  Reason for Assistance: requires increased time to complete    Transfers:   Device Used: 2 wheeled walker  Sit to Stand: Stand By Assistance (SBA)  Stand to Sit: Stand By Assistance (SBA)  Shower: tub shower: side stepping Stand By Assistance (SBA)  Reason for Assistance: requires increased time to complete, verbal cues for positioning and technique    Ambulation:   Assistance Level: Stand By Assistance (SBA)  Distance: 350 feet  Device Used: 2 wheeled walker  Reason for Assistance: requires increased time to complete, weakness    Exercises:  Not addressed this session     Activity Tolerance: no limits in patient's ability to participate in session     GOALS:     Rehab potential is excellent due to positive factors family support, motivation level, activity tolerance and negative factors not apparent at this time    Review Date: 6/16/2018  1. Patient will complete upper body bathing with Modified [place] (mod I).  Progressing toward  2. Patient will complete upper body dressing with Modified [place] (mod I).  Progressing toward  3. Patient will complete lower body bathing with Modified [place] (mod I).  Progressing toward  4. Patient will complete lower body dressing with Modified [place] (mod I).  Progressing toward  5. Patient will complete toileting with Modified [place] (mod I).  Progressing toward  6. Patient will complete  tub/shower transfer with Stand By Assistance (SBA).  Goal achieved, date:6-12-18  7. Patient will complete hygiene/grooming with Modified [place] (mod I).  Progressing toward  8. Patient will complete item retrieval with Modified [place] (mod I). Progressing toward      PLAN OF CARE:    OT Frequency: Twice a day  Duration: 2-4 days  Treatment Interventions: ADL retraining, Functional transfer training, UE strengthening/ROM, Patient/Family training, Neuro muscular reeducation, Equipment eval/education     RECOMMENDATIONS/EDUCATION:    Learner: patient  Readiness to Learn: acceptance  Learning Method: Verbal and Demonstration  Barriers to Learning: no barriers apparent at this time  Learning Evaluation: Verbalizes understanding and Demonstrates understanding  Teaching Content: Positioning, Safety Awareness, Functional Mobility, ADL training and Role of occupational therapy in the hospital setting  Please reference the patient education activity for further information regarding the patient's learning assessment.  Equipment for discharge: to be determined - continuing to assess needs at this time     See doc flowsheet (OT assess/treat/goals/charges) for specific information regarding time spent with patient.     Treatment Plan for Next Session: standing shower if not d/c'd POD 1    The plan of care and goals were established with the patient and she is in agreement.     ASSESSMENT:      Patient presents to occupational therapy on POD 1 s/p right TKR.  Patient is demonstrating decreased range of motion, decreased strength, pain which is limiting the completion of all ADLs and all functional mobility.  Further skilled occupational therapy is required to address these limitations in attempt to maximize the patient's independence.    Recommendations for Discharge: OT: Home        Recommendation for Discharge: PT: Home, OP therapy           After today's session this therapist is recommending the above location  for optimal discharge.  This recommendation is supported by at initial evaluation  patient scored a 40.22 on the Am-Pac Activity domain, which indicates patient is safe to return home .  Pt will have 24 hour assist for at least 1 week.

## 2021-06-29 NOTE — ASU DISCHARGE PLAN (ADULT/PEDIATRIC) - ASU DC SPECIAL INSTRUCTIONSFT
WOUND CARE:  Do NOT rub, or pick at your incision site  the glue will  wear off in 5-7 days  do not get your incision wet for 3 days   AFTER 3 days you may shower:   - use mild soap and gentle warm stream, pat dry with towel  DO NOT apply lotions, powders, ointment, or perfumes to incision  wear loose clothing around incision for 7 days  f/u appointment as instructed     ACTIVITY:   resume normal activities    Follow heart healthy diet recommended by your doctor, , if you smoke STOP SMOKING ( may call [phone] for center of tobacco control if you need assistance)     ***CALL YOUR DOCTOR***  if you experience: fever, chills, body aches, or severe pain, swelling, redness, heat or yellow discharge at incision site  If you are unable to reach your doctor, you may contact Cardiology Office at [facility] at [phone]

## 2021-06-29 NOTE — H&P CARDIOLOGY - PMH
Anxiety and depression    Arthritis    Emphysema lung    HTN (hypertension)    Lower back pain    Lung cancer    Solitary pulmonary nodule     Anxiety and depression    Arthritis    Emphysema lung    Former smoker    HLD (hyperlipidemia)    HTN (hypertension)    Lower back pain    Lung cancer    Solitary pulmonary nodule

## 2021-06-29 NOTE — ASU DISCHARGE PLAN (ADULT/PEDIATRIC) - CARE PROVIDER_API CALL
Samuel Liu)  Cardiac Electrophysiology; Cardiology  [address] [place] [address]  Phone: [phone]  Fax: [phone]  Follow Up Time:

## 2021-06-29 NOTE — ASU PATIENT PROFILE, ADULT - PMH
Anxiety and depression    Arthritis    Emphysema lung    HTN (hypertension)    Lower back pain    Lung cancer    Solitary pulmonary nodule

## 2021-06-30 PROBLEM — C34.90 MALIGNANT NEOPLASM OF UNSPECIFIED PART OF UNSPECIFIED BRONCHUS OR LUNG: Chronic | Status: ACTIVE | Noted: 2021-06-29

## 2021-06-30 PROBLEM — Z87.891 PERSONAL HISTORY OF NICOTINE DEPENDENCE: Chronic | Status: ACTIVE | Noted: 2021-06-29

## 2021-06-30 PROBLEM — E78.5 HYPERLIPIDEMIA, UNSPECIFIED: Chronic | Status: ACTIVE | Noted: 2021-06-29

## 2021-07-02 ENCOUNTER — NON-APPOINTMENT (OUTPATIENT)
Age: 81
End: 2021-07-02

## 2021-07-12 ENCOUNTER — NON-APPOINTMENT (OUTPATIENT)
Age: 81
End: 2021-07-12

## 2021-07-12 ENCOUNTER — APPOINTMENT (OUTPATIENT)
Dept: ELECTROPHYSIOLOGY | Facility: CLINIC | Age: 81
End: 2021-07-12
Payer: MEDICARE

## 2021-07-12 VITALS
SYSTOLIC BLOOD PRESSURE: 131 MMHG | DIASTOLIC BLOOD PRESSURE: 79 MMHG | HEART RATE: 87 BPM | BODY MASS INDEX: 22.2 KG/M2 | WEIGHT: 130 LBS | OXYGEN SATURATION: 97 % | HEIGHT: 64 IN

## 2021-07-12 PROCEDURE — 93000 ELECTROCARDIOGRAM COMPLETE: CPT

## 2021-07-16 ENCOUNTER — APPOINTMENT (OUTPATIENT)
Dept: NEUROLOGY | Facility: CLINIC | Age: 81
End: 2021-07-16
Payer: MEDICARE

## 2021-07-16 VITALS
WEIGHT: 130 LBS | HEART RATE: 88 BPM | HEIGHT: 64 IN | DIASTOLIC BLOOD PRESSURE: 80 MMHG | BODY MASS INDEX: 22.2 KG/M2 | SYSTOLIC BLOOD PRESSURE: 128 MMHG

## 2021-07-16 DIAGNOSIS — G45.9 TRANSIENT CEREBRAL ISCHEMIC ATTACK, UNSPECIFIED: ICD-10-CM

## 2021-07-16 PROCEDURE — 99213 OFFICE O/P EST LOW 20 MIN: CPT

## 2021-07-16 NOTE — ASSESSMENT
[FreeTextEntry1] : Impression: This 81-year-old woman with hypertension hyperlipidemia status post surgery for lung cancer cigarette smoker until recently is status post 2 episodes consistent with transient ischemic attack and brain MRI on 5/7/2021 revealing a tiny subacute lacunar infarction of the right centrum semiovale in addition to chronic microvascular change.  Today's neurological exam is normal.  Etiology of TIA and CVA would be microvascular disease  Rule out cardioembolic events\par \par Recommendations: Continue vascular risk factor reduction and smoking cessation.  Status post placement of implantable loop recorder.  Medications reviewed.  Office follow-up in 4 months.\par

## 2021-07-16 NOTE — HISTORY OF PRESENT ILLNESS
[FreeTextEntry1] : Nito Hung is seen for an office visit.  She is neurologically asymptomatic.  She did have the implantable loop recorder placed 3 weeks ago and so far she has not had any evidence of atrial fibrillation or other significant arrhythmia.  She is no longer smoking.  Past history significant for hypertension hyperlipidemia.

## 2021-08-06 ENCOUNTER — APPOINTMENT (OUTPATIENT)
Dept: ELECTROPHYSIOLOGY | Facility: CLINIC | Age: 81
End: 2021-08-06
Payer: MEDICARE

## 2021-08-06 ENCOUNTER — NON-APPOINTMENT (OUTPATIENT)
Age: 81
End: 2021-08-06

## 2021-08-06 PROCEDURE — 93298 REM INTERROG DEV EVAL SCRMS: CPT

## 2021-08-06 PROCEDURE — G2066: CPT

## 2021-09-10 ENCOUNTER — NON-APPOINTMENT (OUTPATIENT)
Age: 81
End: 2021-09-10

## 2021-09-10 ENCOUNTER — APPOINTMENT (OUTPATIENT)
Dept: ELECTROPHYSIOLOGY | Facility: CLINIC | Age: 81
End: 2021-09-10
Payer: MEDICARE

## 2021-09-10 PROCEDURE — G2066: CPT

## 2021-09-10 PROCEDURE — 93298 REM INTERROG DEV EVAL SCRMS: CPT

## 2021-09-29 NOTE — DISCHARGE NOTE NURSING/CASE MANAGEMENT/SOCIAL WORK - NSDCPNDISPN_GEN_ALL_CORE
BMI is 41 and weight, obstructive compression of arteries in veins from this, sedentary lifestyle all contribute to edema and blisters of LEs 
Currently on a prednisone taper, now taking 7 5 mg daily and planning to taper as much as able 
No infection present    Recurrent problem likely due to weight, poor diet and CHF, sedentary lifestyle, vascular insuffiency and aging
Activities of daily living, including home environment that might     exacerbate pain or reduce effectiveness of the pain management plan of care as well as strategies to address these issues/Education provided on the pain management plan of care

## 2021-10-15 ENCOUNTER — APPOINTMENT (OUTPATIENT)
Dept: ELECTROPHYSIOLOGY | Facility: CLINIC | Age: 81
End: 2021-10-15
Payer: MEDICARE

## 2021-10-15 ENCOUNTER — NON-APPOINTMENT (OUTPATIENT)
Age: 81
End: 2021-10-15

## 2021-10-15 PROCEDURE — 93298 REM INTERROG DEV EVAL SCRMS: CPT

## 2021-10-15 PROCEDURE — G2066: CPT

## 2021-11-16 ENCOUNTER — APPOINTMENT (OUTPATIENT)
Dept: NEUROLOGY | Facility: CLINIC | Age: 81
End: 2021-11-16
Payer: MEDICARE

## 2021-11-16 VITALS
HEIGHT: 64 IN | HEART RATE: 86 BPM | DIASTOLIC BLOOD PRESSURE: 78 MMHG | BODY MASS INDEX: 22.2 KG/M2 | SYSTOLIC BLOOD PRESSURE: 126 MMHG | WEIGHT: 130 LBS

## 2021-11-16 PROCEDURE — 99214 OFFICE O/P EST MOD 30 MIN: CPT

## 2021-11-16 NOTE — HISTORY OF PRESENT ILLNESS
[FreeTextEntry1] : Nito Hung continues to do well and is neurologically asymptomatic.  She has the implantable loop recorder and there have been no significant arrhythmias.  She is no longer smoking.  She has a past history of hypertension hyperlipidemia.  Medications are reviewed.  Continue aspirin 81 mg statin and antihypertensives.

## 2021-11-16 NOTE — PHYSICAL EXAM
[FreeTextEntry1] : Head:  Normocephalic Neck: Supple nontender no carotid bruits.  \par \par Mental Status:  Alert Oriented X3 Speech normal and no aphasia or dysarthria.\par \par Cranial Nerves:  PERRL, Visual Fields full  EOMI no diplopia no ptosis no nystagmus, V through XII intact.\par \par Motor:  No drift, normal strength tone and coordination and no focal atrophy. No abnormal movements. No dysmetria.  Normal rapid alternating movements. \par \par DTRs: Symmetric and 2+.  Plantars flexor.  No Clonus.\par \par Sensory:  Normal testing with pin light touch and vibration and  Joint position sense.  Normal DSS to touch.\par \par Gait:  Normal including tandem walking heel toe walking and Rhomberg.\par

## 2021-11-16 NOTE — ASSESSMENT
[FreeTextEntry1] : Impression: This 81-year-old woman with a history of hypertension hyperlipidemia former smoker status post surgery for lung cancer is status post CVA with brain MRI positive on 5/7/2021 for a tiny subacute lacunar infarction of the right centrum semiovale.  There was also chronic microvascular change.  She is neurologically asymptomatic and today's neurological exam is intact.  Etiology of CVA includes microvascular disease rule out cardioembolic events.\par \par Recommendations: Smoking cessation vascular risk factor reduction medication as ordered office follow-up in 1 year or else as needed.\par

## 2021-11-19 ENCOUNTER — NON-APPOINTMENT (OUTPATIENT)
Age: 81
End: 2021-11-19

## 2021-11-19 ENCOUNTER — APPOINTMENT (OUTPATIENT)
Dept: ELECTROPHYSIOLOGY | Facility: CLINIC | Age: 81
End: 2021-11-19
Payer: MEDICARE

## 2021-11-19 PROCEDURE — 93298 REM INTERROG DEV EVAL SCRMS: CPT

## 2021-11-19 PROCEDURE — G2066: CPT

## 2021-12-29 ENCOUNTER — APPOINTMENT (OUTPATIENT)
Dept: ELECTROPHYSIOLOGY | Facility: CLINIC | Age: 81
End: 2021-12-29
Payer: MEDICARE

## 2021-12-29 ENCOUNTER — NON-APPOINTMENT (OUTPATIENT)
Age: 81
End: 2021-12-29

## 2021-12-29 PROCEDURE — G2066: CPT

## 2021-12-29 PROCEDURE — 93298 REM INTERROG DEV EVAL SCRMS: CPT

## 2022-02-03 ENCOUNTER — APPOINTMENT (OUTPATIENT)
Dept: ELECTROPHYSIOLOGY | Facility: CLINIC | Age: 82
End: 2022-02-03
Payer: MEDICARE

## 2022-02-03 ENCOUNTER — NON-APPOINTMENT (OUTPATIENT)
Age: 82
End: 2022-02-03

## 2022-02-03 PROCEDURE — G2066: CPT

## 2022-02-03 PROCEDURE — 93298 REM INTERROG DEV EVAL SCRMS: CPT

## 2022-02-08 ENCOUNTER — OUTPATIENT (OUTPATIENT)
Dept: OUTPATIENT SERVICES | Facility: HOSPITAL | Age: 82
LOS: 1 days | End: 2022-02-08
Payer: MEDICARE

## 2022-02-08 ENCOUNTER — APPOINTMENT (OUTPATIENT)
Dept: CT IMAGING | Facility: HOSPITAL | Age: 82
End: 2022-02-08
Payer: MEDICARE

## 2022-02-08 DIAGNOSIS — C34.90 MALIGNANT NEOPLASM OF UNSPECIFIED PART OF UNSPECIFIED BRONCHUS OR LUNG: ICD-10-CM

## 2022-02-08 DIAGNOSIS — Z90.2 ACQUIRED ABSENCE OF LUNG [PART OF]: Chronic | ICD-10-CM

## 2022-02-08 PROCEDURE — 71250 CT THORAX DX C-: CPT | Mod: 26,MG

## 2022-02-08 PROCEDURE — G1004: CPT

## 2022-02-08 PROCEDURE — 71250 CT THORAX DX C-: CPT | Mod: MG

## 2022-03-10 ENCOUNTER — NON-APPOINTMENT (OUTPATIENT)
Age: 82
End: 2022-03-10

## 2022-03-10 ENCOUNTER — APPOINTMENT (OUTPATIENT)
Dept: ELECTROPHYSIOLOGY | Facility: CLINIC | Age: 82
End: 2022-03-10
Payer: MEDICARE

## 2022-03-10 PROCEDURE — G2066: CPT

## 2022-03-10 PROCEDURE — 93298 REM INTERROG DEV EVAL SCRMS: CPT

## 2022-04-14 ENCOUNTER — APPOINTMENT (OUTPATIENT)
Dept: ELECTROPHYSIOLOGY | Facility: CLINIC | Age: 82
End: 2022-04-14
Payer: MEDICARE

## 2022-04-14 ENCOUNTER — NON-APPOINTMENT (OUTPATIENT)
Age: 82
End: 2022-04-14

## 2022-04-14 PROCEDURE — 93298 REM INTERROG DEV EVAL SCRMS: CPT

## 2022-04-14 PROCEDURE — G2066: CPT

## 2022-05-27 ENCOUNTER — APPOINTMENT (OUTPATIENT)
Dept: ELECTROPHYSIOLOGY | Facility: CLINIC | Age: 82
End: 2022-05-27
Payer: MEDICARE

## 2022-05-27 ENCOUNTER — NON-APPOINTMENT (OUTPATIENT)
Age: 82
End: 2022-05-27

## 2022-05-27 PROCEDURE — 93298 REM INTERROG DEV EVAL SCRMS: CPT

## 2022-05-27 PROCEDURE — G2066: CPT

## 2022-06-30 ENCOUNTER — APPOINTMENT (OUTPATIENT)
Dept: ELECTROPHYSIOLOGY | Facility: CLINIC | Age: 82
End: 2022-06-30

## 2022-06-30 ENCOUNTER — NON-APPOINTMENT (OUTPATIENT)
Age: 82
End: 2022-06-30

## 2022-06-30 PROCEDURE — G2066: CPT

## 2022-06-30 PROCEDURE — 93298 REM INTERROG DEV EVAL SCRMS: CPT

## 2022-07-14 ENCOUNTER — APPOINTMENT (OUTPATIENT)
Dept: ELECTROPHYSIOLOGY | Facility: CLINIC | Age: 82
End: 2022-07-14

## 2022-07-21 ENCOUNTER — OUTPATIENT (OUTPATIENT)
Dept: OUTPATIENT SERVICES | Facility: HOSPITAL | Age: 82
LOS: 1 days | End: 2022-07-21
Payer: MEDICARE

## 2022-07-21 ENCOUNTER — APPOINTMENT (OUTPATIENT)
Dept: CT IMAGING | Facility: HOSPITAL | Age: 82
End: 2022-07-21

## 2022-07-21 ENCOUNTER — RESULT REVIEW (OUTPATIENT)
Age: 82
End: 2022-07-21

## 2022-07-21 DIAGNOSIS — C34.90 MALIGNANT NEOPLASM OF UNSPECIFIED PART OF UNSPECIFIED BRONCHUS OR LUNG: ICD-10-CM

## 2022-07-21 DIAGNOSIS — Z90.2 ACQUIRED ABSENCE OF LUNG [PART OF]: Chronic | ICD-10-CM

## 2022-07-21 PROCEDURE — 71250 CT THORAX DX C-: CPT

## 2022-07-21 PROCEDURE — 71250 CT THORAX DX C-: CPT | Mod: 26,MH

## 2022-07-22 ENCOUNTER — NON-APPOINTMENT (OUTPATIENT)
Age: 82
End: 2022-07-22

## 2022-08-02 ENCOUNTER — APPOINTMENT (OUTPATIENT)
Dept: THORACIC SURGERY | Facility: CLINIC | Age: 82
End: 2022-08-02

## 2022-08-02 VITALS
DIASTOLIC BLOOD PRESSURE: 70 MMHG | OXYGEN SATURATION: 95 % | HEIGHT: 64 IN | BODY MASS INDEX: 20.66 KG/M2 | RESPIRATION RATE: 17 BRPM | SYSTOLIC BLOOD PRESSURE: 128 MMHG | HEART RATE: 93 BPM | WEIGHT: 121 LBS

## 2022-08-02 PROCEDURE — 99214 OFFICE O/P EST MOD 30 MIN: CPT

## 2022-08-02 RX ORDER — ASPIRIN ENTERIC COATED TABLETS 81 MG 81 MG/1
81 TABLET, DELAYED RELEASE ORAL
Refills: 0 | Status: ACTIVE | COMMUNITY

## 2022-08-04 NOTE — HISTORY OF PRESENT ILLNESS
[FreeTextEntry1] : Ms. FLAKO STUBBS is a 82 year old female, s/p FB, uniportal Left VATS, SERA wedge resection x1, completion LULobectomy, MLND on 8/28/19. Pathology revealed lung T2N0 adenocarcinoma, solid, cribriform and acinar patterns. Postoperatively she had increased atelectasis and opacification on CXR and had a Flexible bronchoscopy and BAL on 8/31/19. BAL confirmed pan sensitive pseudomonas PNA. ID was consulted and she was started on antibiotics. She then developed RAUL and was seen by nephrology. ABX changed to renal dosed Cipro. \par \par She is a former smoker (60 PYHx, Quit 7/2019) and PMHx includes HTN, HLD, Asthma, COPD and smoldering myeloma and is followed by Dr. Hancock.\par \par Patient evaluated by Dr. Hancock no adjuvant therapy for lung but patient was referred to [facility] for further workup of MM.\par \par CT chest 05/26/2020:\par - post-op changes\par - 0.4 cm lucency with thickening of its wall in the LLL is slightly more prominent when compared to previous exam\par - several ill-defined opacities within the right lung are unchanged when compared to previous exam.\par - 2.1 cm low-attenuation lesion in the right kidney has increased in size when compared to previous exam. \par \par CT chest on 11/11/2020:\par - emphysema\par - 3 mm LLL nodule (2: 28) with slight interval increase in conspicuity may represent a focus of mucoid impacted distal airway\par - previously 4 mm lucency within the LLL with mild peripheral wall thickening is unchanged\par - several other bilateral small pulmonary nodules or nodular opacities with the largest in the RUL measuring about 5 mm (3: 33) are otherwise unchanged\par - hypodensity within the upper pole of the partially imaged right kidney unchanged. Partially imaged herniation of the retroperitoneal fat through the left posterolateral chest wall is again noted as on the prior study. \par \par CT chest on 04/19/2021:\par - post-op changes\par - 8 mm ill-defined groundglass opacity within the left lower lobe (series 3 image 86) is unchanged. 8 mm subpleural nodular opacity within the right apex (series 3 image 16) likely small focus of pleuroparenchymal scarring is unchanged.5 mm right upper lobe nodule (series 3 image 31) is unchanged. Another 2 mm right upper lobe nodule (series 3 image 40) is also stable.\par - There are other centrilobular micronodules likely impacted distal airways in the setting of COPD which are unchanged. The lungs are otherwise clear. Previously mentioned 3 mm left lower lobe nodule and "lucency with wall thickening" within left lower lobe are not identified on this exam.\par - Small right renal cyst is unchanged. Stable 3 mm nonobstructing left renal stone. 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 Chavez on 05/04/2021. \par \par CT chest on 02/08/2022:\par - Stable solid and groundglass pulmonary nodules, including (but not limited to) 5 mm right upper lobe solid nodule on series 3 image 34, 6 mm right upper lobe groundglass nodule image 30, 8 mm left lower lobe groundglass nodule image 87, 7 mm right apical nodule image 17 and 5 mm left lower lobe solid nodule image 77. Few small irregular posterior left lower lobe nodules, as seen on series 3 images 73-79 remain unchanged.\par \par CT chest on 07/21/2022: \par - Emphysema. \par - Status post left upper lobectomy with stable postsurgical changes. \par - 0.9 cm right upper lobe nodule continues to slightly increased from multiple prior exam, now measuring 200 cu mm, previously 137 cu mm on 2/8/2022 exam and 53 cu mm on 7/22/2019 exam. This likely represents a slow growing primary lung cancer. \par - New small tubular opacity within anterior right upper lobe (series 4 image 27) likely small impacted airway. \par \par Patient is here today for a follow up. Patient c/o SOB on exertion, c/o cough, denies chest pain, fever, chills. \par

## 2022-08-04 NOTE — CONSULT LETTER
[Dear  ___] : Dear  [unfilled], [Consult Letter:] : I had the pleasure of evaluating your patient, [unfilled]. [( Thank you for referring [unfilled] for consultation for _____ )] : Thank you for referring [unfilled] for consultation for [unfilled] [Please see my note below.] : Please see my note below. [Consult Closing:] : Thank you very much for allowing me to participate in the care of this patient.  If you have any questions, please do not hesitate to contact me. [Sincerely,] : Sincerely, [DrHector  ___] : Dr. CARREON [DrHector ___] : Dr. CARREON [FreeTextEntry2] : Dr. Szymanski (Pulm/Ref)\par Dr. Fadia Hampton ((PCP)\par Dr. Hancock (Onc)  [FreeTextEntry3] : Michael Brooks MD, FACS \par Chief, Division of Thoracic Surgery \par Director, Minimally Invasive Thoracic Surgery \par Department of Cardiovascular and Thoracic Surgery \par [facility] \par , Cardiovascular and Thoracic Surgery\par \par

## 2022-08-04 NOTE — ASSESSMENT
[FreeTextEntry1] : Ms. FLAKO STUBBS is a 82 year old female, S/P FB, uniportal Left VATS, SERA wedge resection x1, completion LULobectomy, MLND on 8/28/19. Pathology revealed lung T2N0 adenocarcinoma, solid, cribriform and acinar patterns. Postoperatively she had increased atelectasis and opacification on CXR and had a Flexible bronchoscopy and BAL on 8/31/19. BAL confirmed pan sensitive pseudomonas PNA. ID was consulted and she was started on antibiotics. She then developed RAUL and was seen by nephrology. ABX changed to renal dosed Cipro. \par \par She is a former smoker (60 PYHx, Quit 7/2019) and PMHx includes HTN, HLD, Asthma, COPD and smoldering myeloma and is followed by Dr. Hancock.\par \par Patient evaluated by Dr. Hancock no adjuvant therapy for lung but patient was referred to [facility] for further workup of MM.\par \par CT chest 05/26/2020:\par - post-op changes\par - 0.4 cm lucency with thickening of its wall in the LLL is slightly more prominent when compared to previous exam\par - several ill-defined opacities within the right lung are unchanged when compared to previous exam.\par - 2.1 cm low-attenuation lesion in the right kidney has increased in size when compared to previous exam. \par \par CT chest on 11/11/2020:\par - emphysema\par - 3 mm LLL nodule (2: 28) with slight interval increase in conspicuity may represent a focus of mucoid impacted distal airway\par - previously 4 mm lucency within the LLL with mild peripheral wall thickening is unchanged\par - several other bilateral small pulmonary nodules or nodular opacities with the largest in the RUL measuring about 5 mm (3: 33) are otherwise unchanged\par - hypodensity within the upper pole of the partially imaged right kidney unchanged. Partially imaged herniation of the retroperitoneal fat through the left posterolateral chest wall is again noted as on the prior study. \par \par CT chest on 04/19/2021:\par - post-op changes\par - 8 mm ill-defined groundglass opacity within the left lower lobe (series 3 image 86) is unchanged. 8 mm subpleural nodular opacity within the right apex (series 3 image 16) likely small focus of pleuroparenchymal scarring is unchanged.5 mm right upper lobe nodule (series 3 image 31) is unchanged. Another 2 mm right upper lobe nodule (series 3 image 40) is also stable.\par - There are other centrilobular micronodules likely impacted distal airways in the setting of COPD which are unchanged. The lungs are otherwise clear. Previously mentioned 3 mm left lower lobe nodule and "lucency with wall thickening" within left lower lobe are not identified on this exam.\par - Small right renal cyst is unchanged. Stable 3 mm nonobstructing left renal stone. 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 Chavez on 05/04/2021. \par \par CT chest on 02/08/2022:\par - Stable solid and groundglass pulmonary nodules, including (but not limited to) 5 mm right upper lobe solid nodule on series 3 image 34, 6 mm right upper lobe groundglass nodule image 30, 8 mm left lower lobe groundglass nodule image 87, 7 mm right apical nodule image 17 and 5 mm left lower lobe solid nodule image 77. Few small irregular posterior left lower lobe nodules, as seen on series 3 images 73-79 remain unchanged.\par \par CT chest on 07/21/2022: \par - Emphysema. \par - Status post left upper lobectomy with stable postsurgical changes. \par - 0.9 cm right upper lobe nodule continues to slightly increased from multiple prior exam, now measuring 200 cu mm, previously 137 cu mm on 2/8/2022 exam and 53 cu mm on 7/22/2019 exam. This likely represents a slow growing primary lung cancer. \par - New small tubular opacity within anterior right upper lobe (series 4 image 27) likely small impacted airway. \par \par I have reviewed the patient's medical records and diagnostic images at time of this office consultation and have made the following recommendation:\par 1. CT chest in 02/2022 and 07/2022 reviewed and explained to patient and her family member, RUL nodule increase in size, I recommended a Flexible Bronchoscopy, Right VATS, Lung resection, RUL with IR marking. Risks and benefits and alternatives explained to patient, all questions answered, patient would like to think about it and call back for her decision. \par 2. Will need PET/CT, PFTs, and lung perfusion scan, cardiac clearance if patient wants to proceed with surgery. \par \par I personally performed the services described in the documentation, reviewed the documentation recorded by the scribe in my presence and it accurately and completely records my words and actions.\par \par I, Charisse Branch, NP, am scribing for and the presence of ROGER Wilcox, the following sections HISTORY OF PRESENT ILLNESS, PAST MEDICAL/FAMILY/SOCIAL HISTORY; REVIEW OF SYSTEMS; VITAL SIGNS; PHYSICAL EXAM; DISPOSITION.

## 2022-08-12 ENCOUNTER — NON-APPOINTMENT (OUTPATIENT)
Age: 82
End: 2022-08-12

## 2022-08-15 ENCOUNTER — APPOINTMENT (OUTPATIENT)
Dept: ELECTROPHYSIOLOGY | Facility: CLINIC | Age: 82
End: 2022-08-15

## 2022-08-15 ENCOUNTER — NON-APPOINTMENT (OUTPATIENT)
Age: 82
End: 2022-08-15

## 2022-08-15 PROCEDURE — 93298 REM INTERROG DEV EVAL SCRMS: CPT

## 2022-08-15 PROCEDURE — G2066: CPT

## 2022-08-16 NOTE — PHYSICAL THERAPY INITIAL EVALUATION ADULT - GAIT DISTANCE, PT EVAL
Speech Treatment Note    Today's date: 2022  Patient name: Karon Murcia  : 2015  MRN: [id number]  Referring provider: Barber Lyle PA-C  Dx:   Encounter Diagnosis     ICD-10-CM    1  Speech and language deficits  F80 9    2  Speech delay  F80 9        Start Time: 1400  Stop Time: 1450  Total time in clinic (min): 50 minutes    Visit Number: 19  Co-Treatment with OT: Tori Botello for client due to difficulty with sensory needs and regulation that are impacting participation in speech activities      Subjective/Behavioral: Ray was negative for risk factors of COVID-19 with parent questionnaire, he did not mask for the session   Speech therapist wore a KN95 mask for the session   Ray's father waited outside in the car during session  Hand washing was completed before and after session  Room was sanitized prior to and after session        Ray was alert for the entire session  He often would shake his head "no" to other requests if therapist attempted to provide an activity that was not lead by the child  He preferred activities that were similar to the game [place] where blocks fit together in certain forms  During the activities, it was noted that Fans L eye was drifting inward significantly today and rarely returned to midline  When he would look to the left, he would turn his entire head  It was difficult to tell if Rashard Lawrence was doing this purposefully as he would put two items in front of his eyes and move them in and out  His father forgot to bring his glasses to session  His father was informed of this concern and to monitor for changes as it could be neurological if his glasses are not correcting this and he would require medical attention  After session, SLP and OT attempted to contact his mother, she has not returned call yet   His father also reported Rashard Lawrence will start  on 2022, he does not have speech/OT set up for school and has not yet had an evaluation with developmental pediatrics       Objective: Therapist focused session on functional communication to express wants/needs verbally without frustration or communication breakdowns  Zohra Copeland was able to independently request an activity or action x4 by utilizing rehearsed phrases (e g , I have an idea, play tetris!)  With carrier phrases provided by SLP during session, he was able to request items or actions from therapists an additional 7x  Zohra Copeland followed 1 step directions involving basic spatial and quantitative concepts during a coloring activity with 1/10 accuracy independently, however he was often refusing the direction given by therapist saying "no "  Prompting did not improve accuracy today      Assessment:     Zohra Copeland is a pleasant 13year-old boy a who presents with a moderate receptive language disorder, and a moderate expressive language disorder  Zohra Copeland is also presenting with a moderate pragmatic language disorder  [place] is characterized by difficulty with the following age-appropriate tasks; functionally/spontaneously requesting and rejecting, answering questions, utterance length, identification of basic concepts, following directions, social use of language and pragmatics, requesting assistance, and participating in conversations       Spontaneous utterance length is improving resulting in increased ability to express wants/needs in sessions, however Zohra Copeland still benefits from verbal models and visual carrier phrases to assist with requesting, protesting, asking and answering questions   Prompting is still intermittently required to verbally request rather than pointing and grunting/squeaking  [place] continues and scripting, making it difficult to answer questions that are factual or in conversation   This is most common when he is unable to verbalize what he wants  Imaginary play is beneficial in improving langugae and utterance length  During times where he is overwhelmed, he is unable to express feelings or wants/needs resulting in meltdowns and significant frustration   Visual sentence strips are improving spontaneous communication and requesting with faded prompts   Continued difficulty is noted with moderately complex yes/ no questions, however in relation to basic naming and labeling he demonstrates minimal difficulty  He is unable to answer factual [address] questions, but is able to select answers with a choice of 2  Ray follows directions with visual prompts, but has difficulty comprehending basic concepts      At this time, it is recommended that Ray receive continued speech therapy services   Without speech therapy, he would be at risk for; further developmental delay, social isolation, behaviors, learning difficulties, dependence on others for communication, and difficulty expressing wants and needs      During session today, Ray showed increase ability to effectively and efficiently express wants/needs to familiar adults utilizing a combination of verbal and visual carrier phrases  Anum Garcia, communication breakdowns were more present when Melvin Hand was refusing or unhappy with an activity again today  He is demonstrating mild difficulty with following direction and concept identification as well, however behavioral refusal of directions does impact progress with this type of activity at this time         Other:Parent was provided with home exercises/ activies to target goals in plan of care , Discussed session and progress with caregiver/family member after today's session   Parent is in agreement with POC at this time      Recommendations:Continue with Plan of Care, Recommend consult with developmental pediatrician (Mother was given this information) and developmental optometrist   Consider evaluation with OT for feeding concerns and pediatric nutritionist  May benefit from bilingual SLP if able to find- mom does not feel he will respond to - SLP provided information to parent for bilingual therapist but they refused  Consider neurology consult  [address]

## 2022-09-19 ENCOUNTER — APPOINTMENT (OUTPATIENT)
Dept: ELECTROPHYSIOLOGY | Facility: CLINIC | Age: 82
End: 2022-09-19

## 2022-09-19 ENCOUNTER — NON-APPOINTMENT (OUTPATIENT)
Age: 82
End: 2022-09-19

## 2022-09-19 PROCEDURE — 93298 REM INTERROG DEV EVAL SCRMS: CPT

## 2022-09-19 PROCEDURE — G2066: CPT

## 2022-09-22 ENCOUNTER — OUTPATIENT (OUTPATIENT)
Dept: OUTPATIENT SERVICES | Facility: HOSPITAL | Age: 82
LOS: 1 days | Discharge: HOME | End: 2022-09-22

## 2022-09-22 DIAGNOSIS — Z90.2 ACQUIRED ABSENCE OF LUNG [PART OF]: Chronic | ICD-10-CM

## 2022-09-22 DIAGNOSIS — D64.9 ANEMIA, UNSPECIFIED: ICD-10-CM

## 2022-09-27 ENCOUNTER — RESULT REVIEW (OUTPATIENT)
Age: 82
End: 2022-09-27

## 2022-09-27 ENCOUNTER — NON-APPOINTMENT (OUTPATIENT)
Age: 82
End: 2022-09-27

## 2022-09-27 ENCOUNTER — APPOINTMENT (OUTPATIENT)
Dept: HEMATOLOGY ONCOLOGY | Facility: CLINIC | Age: 82
End: 2022-09-27

## 2022-09-27 ENCOUNTER — APPOINTMENT (OUTPATIENT)
Dept: INFUSION THERAPY | Facility: HOSPITAL | Age: 82
End: 2022-09-27

## 2022-09-27 VITALS
BODY MASS INDEX: 21.08 KG/M2 | OXYGEN SATURATION: 96 % | SYSTOLIC BLOOD PRESSURE: 119 MMHG | HEIGHT: 64 IN | HEART RATE: 90 BPM | RESPIRATION RATE: 18 BRPM | DIASTOLIC BLOOD PRESSURE: 69 MMHG | WEIGHT: 123.46 LBS | TEMPERATURE: 97.2 F

## 2022-09-27 DIAGNOSIS — Z87.891 PERSONAL HISTORY OF NICOTINE DEPENDENCE: ICD-10-CM

## 2022-09-27 LAB
ALBUMIN SERPL ELPH-MCNC: 4.6 G/DL
ALP BLD-CCNC: 90 U/L
ALT SERPL-CCNC: 13 U/L
ANION GAP SERPL CALC-SCNC: 12 MMOL/L
AST SERPL-CCNC: 19 U/L
BASOPHILS # BLD AUTO: 0.07 K/UL — SIGNIFICANT CHANGE UP (ref 0–0.2)
BASOPHILS NFR BLD AUTO: 0.5 % — SIGNIFICANT CHANGE UP (ref 0–2)
BILIRUB SERPL-MCNC: 0.3 MG/DL
BUN SERPL-MCNC: 24 MG/DL
CALCIUM SERPL-MCNC: 10.8 MG/DL
CHLORIDE SERPL-SCNC: 105 MMOL/L
CO2 SERPL-SCNC: 25 MMOL/L
CREAT SERPL-MCNC: 0.91 MG/DL
DEPRECATED KAPPA LC FREE/LAMBDA SER: 40.6 RATIO
EGFR: 64 ML/MIN/1.73M2
EOSINOPHIL # BLD AUTO: 0.36 K/UL — SIGNIFICANT CHANGE UP (ref 0–0.5)
EOSINOPHIL NFR BLD AUTO: 2.8 % — SIGNIFICANT CHANGE UP (ref 0–6)
GLUCOSE SERPL-MCNC: 108 MG/DL
HCT VFR BLD CALC: 35.4 % — SIGNIFICANT CHANGE UP (ref 34.5–45)
HGB BLD-MCNC: 11.5 G/DL — SIGNIFICANT CHANGE UP (ref 11.5–15.5)
IGA SER QL IEP: 63 MG/DL
IGG SER QL IEP: 1278 MG/DL
IGM SER QL IEP: 52 MG/DL
IMM GRANULOCYTES NFR BLD AUTO: 0.3 % — SIGNIFICANT CHANGE UP (ref 0–0.9)
KAPPA LC CSF-MCNC: 0.89 MG/DL
KAPPA LC SERPL-MCNC: 36.13 MG/DL
LYMPHOCYTES # BLD AUTO: 26 % — SIGNIFICANT CHANGE UP (ref 13–44)
LYMPHOCYTES # BLD AUTO: 3.33 K/UL — HIGH (ref 1–3.3)
MCHC RBC-ENTMCNC: 31 PG — SIGNIFICANT CHANGE UP (ref 27–34)
MCHC RBC-ENTMCNC: 32.5 G/DL — SIGNIFICANT CHANGE UP (ref 32–36)
MCV RBC AUTO: 95.4 FL — SIGNIFICANT CHANGE UP (ref 80–100)
MONOCYTES # BLD AUTO: 1.58 K/UL — HIGH (ref 0–0.9)
MONOCYTES NFR BLD AUTO: 12.3 % — SIGNIFICANT CHANGE UP (ref 2–14)
NEUTROPHILS # BLD AUTO: 7.44 K/UL — HIGH (ref 1.8–7.4)
NEUTROPHILS NFR BLD AUTO: 58.1 % — SIGNIFICANT CHANGE UP (ref 43–77)
NRBC # BLD: 0 /100 WBCS — SIGNIFICANT CHANGE UP (ref 0–0)
PLATELET # BLD AUTO: 344 K/UL — SIGNIFICANT CHANGE UP (ref 150–400)
POTASSIUM SERPL-SCNC: 4.5 MMOL/L
PROT SERPL-MCNC: 7.7 G/DL
RBC # BLD: 3.71 M/UL — LOW (ref 3.8–5.2)
RBC # FLD: 13.5 % — SIGNIFICANT CHANGE UP (ref 10.3–14.5)
SODIUM SERPL-SCNC: 142 MMOL/L
VIT B12 SERPL-MCNC: 1101 PG/ML
WBC # BLD: 12.82 K/UL — HIGH (ref 3.8–10.5)
WBC # FLD AUTO: 12.82 K/UL — HIGH (ref 3.8–10.5)

## 2022-09-27 PROCEDURE — 99215 OFFICE O/P EST HI 40 MIN: CPT

## 2022-09-27 PROCEDURE — 99205 OFFICE O/P NEW HI 60 MIN: CPT

## 2022-09-27 NOTE — HISTORY OF PRESENT ILLNESS
[de-identified] : Ms. 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 She has been seen by Dr. Gopi Norris at [facility] with recommendation to have continued clinical monitoring.\par \par In Summer 2019 she underwent Left upper lobe lung resection for Stage 1A (1.6cm with no LN involvement). She has been followed by Dr. Jean Baptiste with serial CT scans.\par Most recent CT 7/2022 showed 0.9cm RUL nodule which had enlarged compared to prior CT with recommendation for PET/CT and possible surgical intervention. However the patient and her daughter opted to wait to discuss this with me in 9/2022 prior to proceeding.\par \par She now presents for in 9/2022 to establish care at the [facility] [100: Normal, no complaints, no evidence of disease.] : 100: Normal, no complaints, no evidence of disease. [ECOG Performance Status: 0 - Fully active, able to carry on all pre-disease performance without restriction] : Performance Status: 0 - Fully active, able to carry on all pre-disease performance without restriction

## 2022-09-27 NOTE — PHYSICAL EXAM
[Fully active, able to carry on all pre-disease performance without restriction] : Status 0 - Fully active, able to carry on all pre-disease performance without restriction [Normal] : affect appropriate [de-identified] : well healed VATS scar on left from SERA resection

## 2022-09-27 NOTE — ASSESSMENT
[FreeTextEntry1] : 83 yo woman with B12 deficiency anemia, Stage 1 Adenocarcinoma of the lung in 2019 and smoldering myeloma\par \par B12 deficiency\par - anemia improved after initiation of injections\par - will continue B12 1000mcg IM every 6-8 weeks\par - will monitor B12 levels periodically\par \par Smoldering myeloma\par - will obtain old bone marrow report from [facility]\par - will continue to monitor for hypercalcemia, renal insufficiency and anemia\par - continue to monitor immunoglobulin levels and M-spike\par \par Lung Adenocarcinoma\par - had extensive discussion with patient and her daughter regarding enlarging RUL lung nodule noted on CT in 7/2022\par - discussed need for PET/CT in order to determine if RUL lesion in solitary vs. widespread recurrent/metastatic disease\par - if solitary lesion, may be candidate for lobectomy vs wedge resection and certailnly would defer to Dr. Jean Baptiste regarding this. If not surgical candidate, would consider stereotactic RT evaluation. If metastatic will need biopsy to determine treatment options.\par - patient agrees to proceed with PET/CT which i have again ordered\par \par \par -Depression screening was completed at the time of the visit and appropriate action taken pending results of screening.\par - Patient had the opportunity to have all their questions answered to their satisfaction \par - RTO in 2 months for next B12 injection; advised to call after PET/CT to review results and discuss treatment planning\par

## 2022-09-27 NOTE — REASON FOR VISIT
[Other: _____] : [unfilled] [FreeTextEntry2] : Here to transfer care from [facility]

## 2022-09-28 DIAGNOSIS — E53.8 DEFICIENCY OF OTHER SPECIFIED B GROUP VITAMINS: ICD-10-CM

## 2022-10-04 ENCOUNTER — APPOINTMENT (OUTPATIENT)
Dept: NUCLEAR MEDICINE | Facility: CLINIC | Age: 82
End: 2022-10-04

## 2022-10-04 ENCOUNTER — OUTPATIENT (OUTPATIENT)
Dept: OUTPATIENT SERVICES | Facility: HOSPITAL | Age: 82
LOS: 1 days | End: 2022-10-04
Payer: MEDICARE

## 2022-10-04 DIAGNOSIS — R93.89 ABNORMAL FINDINGS ON DIAGNOSTIC IMAGING OF OTHER SPECIFIED BODY STRUCTURES: ICD-10-CM

## 2022-10-04 DIAGNOSIS — Z90.2 ACQUIRED ABSENCE OF LUNG [PART OF]: Chronic | ICD-10-CM

## 2022-10-04 DIAGNOSIS — C34.90 MALIGNANT NEOPLASM OF UNSPECIFIED PART OF UNSPECIFIED BRONCHUS OR LUNG: ICD-10-CM

## 2022-10-04 LAB — M PROTEIN SPEC IFE-MCNC: NORMAL

## 2022-10-04 PROCEDURE — 78815 PET IMAGE W/CT SKULL-THIGH: CPT | Mod: 26,PI,MH

## 2022-10-04 PROCEDURE — A9552: CPT

## 2022-10-04 PROCEDURE — 78815 PET IMAGE W/CT SKULL-THIGH: CPT

## 2022-10-11 NOTE — H&P PST ADULT - RESPIRATORY RATE (BREATHS/MIN)
on the discharge service for the patient. I have reviewed and made amendments to the documentation where necessary.
16

## 2022-10-24 ENCOUNTER — NON-APPOINTMENT (OUTPATIENT)
Age: 82
End: 2022-10-24

## 2022-10-25 ENCOUNTER — APPOINTMENT (OUTPATIENT)
Dept: THORACIC SURGERY | Facility: CLINIC | Age: 82
End: 2022-10-25

## 2022-10-25 VITALS
BODY MASS INDEX: 21.34 KG/M2 | HEART RATE: 98 BPM | HEIGHT: 64 IN | DIASTOLIC BLOOD PRESSURE: 69 MMHG | WEIGHT: 125 LBS | SYSTOLIC BLOOD PRESSURE: 121 MMHG | RESPIRATION RATE: 18 BRPM | OXYGEN SATURATION: 95 %

## 2022-10-25 PROCEDURE — 99214 OFFICE O/P EST MOD 30 MIN: CPT

## 2022-10-25 NOTE — ASSESSMENT
[FreeTextEntry1] : Ms. FLAKO STUBBS is a 82 year old female, S/P FB, uniportal Left VATS, SERA wedge resection x1, completion LULobectomy, MLND on 8/28/19. Pathology revealed lung T2N0 adenocarcinoma, solid, cribriform and acinar patterns. Postoperatively she had increased atelectasis and opacification on CXR and had a Flexible bronchoscopy and BAL on 8/31/19. BAL confirmed pan sensitive pseudomonas PNA. ID was consulted and she was started on antibiotics. She then developed RAUL and was seen by nephrology. ABX changed to renal dosed Cipro. \par \par She is a former smoker (60 PYHx, Quit 7/2019) and PMHx includes HTN, HLD, Asthma, COPD and smoldering myeloma and is followed by Dr. Hancock.\par \par Patient evaluated by Dr. Hancock no adjuvant therapy for lung but patient was referred to [facility] for further workup of MM.\par \par CT chest on 07/21/2022: \par - Emphysema. \par - Status post left upper lobectomy with stable postsurgical changes. \par - 0.9 cm right upper lobe nodule continues to slightly increased from multiple prior exam, now measuring 200 cu mm, previously 137 cu mm on 2/8/2022 exam and 53 cu mm on 7/22/2019 exam. This likely represents a slow growing primary lung cancer. \par - New small tubular opacity within anterior right upper lobe (series 4 image 27) likely small impacted airway. \par \par PET/CT on 10/04/2022:\par - Status post interval left upper lobectomy and resection of previously seen FDG avid mixed groundglass nodule. Non-FDG avid postsurgical changes.\par - New FDG avid right upper lung nodule which is unchanged from recent CT where it measured 0.9 cm (SUV 3.0; image 78). Stable 0.5 cm groundglass nodule in the periphery of the right upper lobe (image 89), 0.2 cm nodule in the right anterior apex (image 74) and 0.4 cm nodular opacity in the right lung base (image 120) , too small to characterize.\par - Resolution of focal uptake in the marrow of the right humeral diaphysis.\par \par I have reviewed the patient's medical records and diagnostic images at time of this office consultation and have made the following recommendation:\par 1. PET CT reviewed.  \par 2. Return for telehealth visit to discuss requested PFT and VQ scan with cardiac clearance. \par \par I personally performed the services described in the documentation, reviewed the documentation recorded by the scribe in my presence and it accurately and completely records my words and actions.\par \par I, CHELITA Saenz, am scribing for and in the presence of ROGER Wilcox, the following sections HISTORY OF PRESENT ILLNESS, PAST MEDICAL/FAMILY/SOCIAL HISTORY; REVIEW OF SYSTEMS; VITAL SIGNS; PHYSICAL EXAM; DISPOSITION.\par  \par

## 2022-10-25 NOTE — CONSULT LETTER
[Dear  ___] : Dear  [unfilled], [Consult Letter:] : I had the pleasure of evaluating your patient, [unfilled]. [( Thank you for referring [unfilled] for consultation for _____ )] : Thank you for referring [unfilled] for consultation for [unfilled] [Please see my note below.] : Please see my note below. [Consult Closing:] : Thank you very much for allowing me to participate in the care of this patient.  If you have any questions, please do not hesitate to contact me. [Sincerely,] : Sincerely, [DrHector  ___] : Dr. CARREON [DrHector ___] : Dr. CARREON [FreeTextEntry2] : Dr. Szymnaski (Pulm/Ref)\par Dr. Fadia Hampton ((PCP)\par Dr. Hancock (Onc)  [FreeTextEntry3] : Michael Brooks MD, FACS \par Chief, Division of Thoracic Surgery \par Director, Minimally Invasive Thoracic Surgery \par Department of Cardiovascular and Thoracic Surgery \par [facility] \par , Cardiovascular and Thoracic Surgery\par \par

## 2022-10-25 NOTE — HISTORY OF PRESENT ILLNESS
[FreeTextEntry1] : Ms. FLAKO STUBBS is a 82 year old female, s/p FB, uniportal Left VATS, SERA wedge resection x1, completion LULobectomy, MLND on 8/28/19. Pathology revealed lung T2N0 adenocarcinoma, solid, cribriform and acinar patterns. Postoperatively she had increased atelectasis and opacification on CXR and had a Flexible bronchoscopy and BAL on 8/31/19. BAL confirmed pan sensitive pseudomonas PNA. ID was consulted and she was started on antibiotics. She then developed RAUL and was seen by nephrology. ABX changed to renal dosed Cipro. \par \par She is a former smoker (60 PYHx, Quit 7/2019) and PMHx includes HTN, HLD, Asthma, COPD and smoldering myeloma and is followed by Dr. Hancock.\par \par Patient evaluated by Dr. Hancock no adjuvant therapy for lung but patient was referred to [facility] for further workup of MM.\par \par CT chest 05/26/2020:\par - post-op changes\par - 0.4 cm lucency with thickening of its wall in the LLL is slightly more prominent when compared to previous exam\par - several ill-defined opacities within the right lung are unchanged when compared to previous exam.\par - 2.1 cm low-attenuation lesion in the right kidney has increased in size when compared to previous exam. \par \par CT chest on 11/11/2020:\par - emphysema\par - 3 mm LLL nodule (2: 28) with slight interval increase in conspicuity may represent a focus of mucoid impacted distal airway\par - previously 4 mm lucency within the LLL with mild peripheral wall thickening is unchanged\par - several other bilateral small pulmonary nodules or nodular opacities with the largest in the RUL measuring about 5 mm (3: 33) are otherwise unchanged\par - hypodensity within the upper pole of the partially imaged right kidney unchanged. Partially imaged herniation of the retroperitoneal fat through the left posterolateral chest wall is again noted as on the prior study. \par \par CT chest on 04/19/2021:\par - post-op changes\par - 8 mm ill-defined groundglass opacity within the left lower lobe (series 3 image 86) is unchanged. 8 mm subpleural nodular opacity within the right apex (series 3 image 16) likely small focus of pleuroparenchymal scarring is unchanged.5 mm right upper lobe nodule (series 3 image 31) is unchanged. Another 2 mm right upper lobe nodule (series 3 image 40) is also stable.\par - There are other centrilobular micronodules likely impacted distal airways in the setting of COPD which are unchanged. The lungs are otherwise clear. Previously mentioned 3 mm left lower lobe nodule and "lucency with wall thickening" within left lower lobe are not identified on this exam.\par - Small right renal cyst is unchanged. Stable 3 mm nonobstructing left renal stone. 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 Chavez on 05/04/2021. \par \par CT chest on 02/08/2022:\par - Stable solid and groundglass pulmonary nodules, including (but not limited to) 5 mm right upper lobe solid nodule on series 3 image 34, 6 mm right upper lobe groundglass nodule image 30, 8 mm left lower lobe groundglass nodule image 87, 7 mm right apical nodule image 17 and 5 mm left lower lobe solid nodule image 77. Few small irregular posterior left lower lobe nodules, as seen on series 3 images 73-79 remain unchanged.\par \par CT chest on 07/21/2022: \par - Emphysema. \par - Status post left upper lobectomy with stable postsurgical changes. \par - 0.9 cm right upper lobe nodule continues to slightly increased from multiple prior exam, now measuring 200 cu mm, previously 137 cu mm on 2/8/2022 exam and 53 cu mm on 7/22/2019 exam. This likely represents a slow growing primary lung cancer. \par - New small tubular opacity within anterior right upper lobe (series 4 image 27) likely small impacted airway. \par \par Patient was seen on 08/02/2022, CT chest in 02/2022 and 07/2022 reviewed and explained to patient and her family member, RUL nodule increase in size, I recommended a Flexible Bronchoscopy, Right VATS, Lung resection, RUL with IR marking. Risks and benefits and alternatives explained to patient, all questions answered, patient would like to think about it and call back for her decision. Will need PET/CT, PFTs, and lung perfusion scan, cardiac clearance if patient wants to proceed with surgery. \par \par PET/CT on 10/04/2022:\par - Status post interval left upper lobectomy and resection of previously seen FDG avid mixed groundglass nodule. Non-FDG avid postsurgical changes.\par - New FDG avid right upper lung nodule which is unchanged from recent CT where it measured 0.9 cm (SUV 3.0; image 78). Stable 0.5 cm groundglass nodule in the periphery of the right upper lobe (image 89), 0.2 cm nodule in the right anterior apex (image 74) and 0.4 cm nodular opacity in the right lung base (image 120) , too small to characterize.\par - Resolution of focal uptake in the marrow of the right humeral diaphysis.\par \par Patient is here today for a follow up. Patient denies worsening shortness of breath, cough, chest pain, fever, chills, unintentional weight loss, hemoptysis.

## 2022-10-26 ENCOUNTER — APPOINTMENT (OUTPATIENT)
Dept: ELECTROPHYSIOLOGY | Facility: CLINIC | Age: 82
End: 2022-10-26

## 2022-10-26 ENCOUNTER — NON-APPOINTMENT (OUTPATIENT)
Age: 82
End: 2022-10-26

## 2022-10-26 PROCEDURE — 93298 REM INTERROG DEV EVAL SCRMS: CPT

## 2022-10-26 PROCEDURE — G2066: CPT

## 2022-11-08 ENCOUNTER — OUTPATIENT (OUTPATIENT)
Dept: OUTPATIENT SERVICES | Facility: HOSPITAL | Age: 82
LOS: 1 days | End: 2022-11-08

## 2022-11-08 ENCOUNTER — APPOINTMENT (OUTPATIENT)
Dept: NUCLEAR MEDICINE | Facility: HOSPITAL | Age: 82
End: 2022-11-08

## 2022-11-08 DIAGNOSIS — R91.1 SOLITARY PULMONARY NODULE: ICD-10-CM

## 2022-11-08 DIAGNOSIS — Z90.2 ACQUIRED ABSENCE OF LUNG [PART OF]: Chronic | ICD-10-CM

## 2022-11-08 PROCEDURE — 78597 LUNG PERFUSION DIFFERENTIAL: CPT | Mod: 26

## 2022-11-10 ENCOUNTER — APPOINTMENT (OUTPATIENT)
Dept: PULMONOLOGY | Facility: CLINIC | Age: 82
End: 2022-11-10

## 2022-11-10 PROCEDURE — 94726 PLETHYSMOGRAPHY LUNG VOLUMES: CPT

## 2022-11-10 PROCEDURE — 94010 BREATHING CAPACITY TEST: CPT

## 2022-11-10 PROCEDURE — 94729 DIFFUSING CAPACITY: CPT

## 2022-11-13 PROBLEM — R91.1 LUNG NODULE: Status: ACTIVE | Noted: 2022-08-02

## 2022-11-13 PROBLEM — J44.9 ASTHMA WITH COPD: Status: ACTIVE | Noted: 2019-07-31

## 2022-11-15 ENCOUNTER — APPOINTMENT (OUTPATIENT)
Dept: THORACIC SURGERY | Facility: CLINIC | Age: 82
End: 2022-11-15

## 2022-11-15 DIAGNOSIS — R91.1 SOLITARY PULMONARY NODULE: ICD-10-CM

## 2022-11-15 DIAGNOSIS — J44.9 CHRONIC OBSTRUCTIVE PULMONARY DISEASE, UNSPECIFIED: ICD-10-CM

## 2022-11-15 PROCEDURE — 99214 OFFICE O/P EST MOD 30 MIN: CPT | Mod: 95

## 2022-11-15 NOTE — CONSULT LETTER
[Dear  ___] : Dear  [unfilled], [Consult Letter:] : I had the pleasure of evaluating your patient, [unfilled]. [( Thank you for referring [unfilled] for consultation for _____ )] : Thank you for referring [unfilled] for consultation for [unfilled] [Please see my note below.] : Please see my note below. [Consult Closing:] : Thank you very much for allowing me to participate in the care of this patient.  If you have any questions, please do not hesitate to contact me. [Sincerely,] : Sincerely, [DrHector  ___] : Dr. CARREON [DrHector ___] : Dr. CARREON [FreeTextEntry2] : Dr. Szymanski (Pulm/Ref)\par Dr. Fadia Hampton ((PCP)\par Dr. Hancock (Onc)  [FreeTextEntry3] : Michael Brooks MD, FACS \par Chief, Division of Thoracic Surgery \par Director, Minimally Invasive Thoracic Surgery \par Department of Cardiovascular and Thoracic Surgery \par [facility] \par , Cardiovascular and Thoracic Surgery\par \par

## 2022-11-15 NOTE — HISTORY OF PRESENT ILLNESS
[Home] : at home, [unfilled] , at the time of the visit. [Medical Office: ([facility])___] : at the medical office located in  [Verbal consent obtained from patient] : the patient, [unfilled] [FreeTextEntry1] : \par Ms. FLAKO STUBBS is a 82 year old female, s/p FB, uniportal Left VATS, SERA wedge resection x1, completion LULobectomy, MLND on 19. Pathology revealed lung T2N0 adenocarcinoma, solid, cribriform and acinar patterns. Postoperatively she had increased atelectasis and opacification on CXR and had a Flexible bronchoscopy and BAL on 19. BAL confirmed pan sensitive pseudomonas PNA. ID was consulted and she was started on antibiotics. She then developed RAUL and was seen by nephrology. ABX changed to renal dosed Cipro. \par \par She is a former smoker (60 PYHx, Quit 2019) and PMHx includes HTN, HLD, Asthma, COPD and smoldering myeloma and is followed by Dr. Hancock.\par \par Patient evaluated by Dr. Hancock no adjuvant therapy for lung but patient was referred to [facility] for further workup of MM.\par \par CT chest 2020:\par - post-op changes\par - 0.4 cm lucency with thickening of its wall in the LLL is slightly more prominent when compared to previous exam\par - several ill-defined opacities within the right lung are unchanged when compared to previous exam.\par - 2.1 cm low-attenuation lesion in the right kidney has increased in size when compared to previous exam. \par \par CT chest on 2020:\par - emphysema\par - 3 mm LLL nodule (2: 28) with slight interval increase in conspicuity may represent a focus of mucoid impacted distal airway\par - previously 4 mm lucency within the LLL with mild peripheral wall thickening is unchanged\par - several other bilateral small pulmonary nodules or nodular opacities with the largest in the RUL measuring about 5 mm (3: 33) are otherwise unchanged\par - hypodensity within the upper pole of the partially imaged right kidney unchanged. Partially imaged herniation of the retroperitoneal fat through the left posterolateral chest wall is again noted as on the prior study. \par \par CT chest on 2021:\par - post-op changes\par - 8 mm ill-defined groundglass opacity within the left lower lobe (series 3 image 86) is unchanged. 8 mm subpleural nodular opacity within the right apex (series 3 image 16) likely small focus of pleuroparenchymal scarring is unchanged.5 mm right upper lobe nodule (series 3 image 31) is unchanged. Another 2 mm right upper lobe nodule (series 3 image 40) is also stable.\par - There are other centrilobular micronodules likely impacted distal airways in the setting of COPD which are unchanged. The lungs are otherwise clear. Previously mentioned 3 mm left lower lobe nodule and "lucency with wall thickening" within left lower lobe are not identified on this exam.\par - Small right renal cyst is unchanged. Stable 3 mm nonobstructing left renal stone. Partially imaged herniation of the retroperitoneal fat through the left posterolateral chest wall is again noted.\par \par Patient fell at home s/p CT head on 2021, old thalamic and basal ganglia infarct, went to ED on 2021 due to aphasia, symptoms suspected to be due to TIA and instructed to f/u with Dr. Chavez on 2021. \par \par CT chest on 2022:\par - Stable solid and groundglass pulmonary nodules, including (but not limited to) 5 mm right upper lobe solid nodule on series 3 image 34, 6 mm right upper lobe groundglass nodule image 30, 8 mm left lower lobe groundglass nodule image 87, 7 mm right apical nodule image 17 and 5 mm left lower lobe solid nodule image 77. Few small irregular posterior left lower lobe nodules, as seen on series 3 images 73-79 remain unchanged.\par \par CT chest on 2022: \par - Emphysema. \par - Status post left upper lobectomy with stable postsurgical changes. \par - 0.9 cm right upper lobe nodule continues to slightly increased from multiple prior exam, now measuring 200 cu mm, previously 137 cu mm on 2022 exam and 53 cu mm on 2019 exam. This likely represents a slow growing primary lung cancer. \par - New small tubular opacity within anterior right upper lobe (series 4 image 27) likely small impacted airway. \par \par Patient was seen on 2022, CT chest in 2022 and 2022 reviewed and explained to patient and her family member, RUL nodule increase in size, I recommended a Flexible Bronchoscopy, Right VATS, Lung resection, RUL with IR marking. Risks and benefits and alternatives explained to patient, all questions answered, patient would like to think about it and call back for her decision. Will need PET/CT, PFTs, and lung perfusion scan, cardiac clearance if patient wants to proceed with surgery. \par \par PET/CT on 10/04/2022:\par - Status post interval left upper lobectomy and resection of previously seen FDG avid mixed groundglass nodule. Non-FDG avid postsurgical changes.\par - New FDG avid right upper lung nodule which is unchanged from recent CT where it measured 0.9 cm (SUV 3.0; image 78). Stable 0.5 cm groundglass nodule in the periphery of the right upper lobe (image 89), 0.2 cm nodule in the right anterior apex (image 74) and 0.4 cm nodular opacity in the right lung base (image 120) , too small to characterize.\par - Resolution of focal uptake in the marrow of the right humeral diaphysis.\par \par Patient was seen on 10/25/2022, Return for telehealth visit to discuss requested PFT and VQ scan with cardiac clearance. \par \par Lung perfusion scan on 10/28/2022: \par - Right lun%;            Left lun%\par Right upper lun%         Left upper lun%\par Right mid lun%         Left mid lun%\par Right lower lun%         Left lower lun%\par \par PFTs on 11/10/2022: FVC 2.32 (88%), FEV1 1.48 (76%), DLCO 39%.\par \par Patient is followed today via Telehealth visit. Patient c/o SOB on exertion, denies cough, chest pain, fever, chills.\par \par

## 2022-11-15 NOTE — DATA REVIEWED
[FreeTextEntry1] : I have independently reviewed patient's Lung perfusion scan on 10/28/2022 and PFTs on 11/10/2022

## 2022-11-15 NOTE — ASSESSMENT
[FreeTextEntry1] : Ms. FLAKO STUBBS is a 82 year old female, s/p FB, uniportal Left VATS, SERA wedge resection x1, completion LULobectomy, MLND on 19. Pathology revealed lung T2N0 adenocarcinoma, solid, cribriform and acinar patterns. Postoperatively she had increased atelectasis and opacification on CXR and had a Flexible bronchoscopy and BAL on 19. BAL confirmed pan sensitive pseudomonas PNA. ID was consulted and she was started on antibiotics. She then developed RAUL and was seen by nephrology. ABX changed to renal dosed Cipro. \par \par She is a former smoker (60 PYHx, Quit 2019) and PMHx includes HTN, HLD, Asthma, COPD and smoldering myeloma and is followed by Dr. Hancock.\par \par Patient evaluated by Dr. Hancock no adjuvant therapy for lung but patient was referred to [facility] for further workup of MM.\par \par Patient fell at home s/p CT head on 2021, old thalamic and basal ganglia infarct, went to ED on 2021 due to aphasia, symptoms suspected to be due to TIA and instructed to f/u with Dr. Chavez on 2021. \par \par CT chest on 2022:\par - Stable solid and groundglass pulmonary nodules, including (but not limited to) 5 mm right upper lobe solid nodule on series 3 image 34, 6 mm right upper lobe groundglass nodule image 30, 8 mm left lower lobe groundglass nodule image 87, 7 mm right apical nodule image 17 and 5 mm left lower lobe solid nodule image 77. Few small irregular posterior left lower lobe nodules, as seen on series 3 images 73-79 remain unchanged.\par \par CT chest on 2022: \par - Emphysema. \par - Status post left upper lobectomy with stable postsurgical changes. \par - 0.9 cm right upper lobe nodule continues to slightly increased from multiple prior exam, now measuring 200 cu mm, previously 137 cu mm on 2022 exam and 53 cu mm on 2019 exam. This likely represents a slow growing primary lung cancer. \par - New small tubular opacity within anterior right upper lobe (series 4 image 27) likely small impacted airway. \par \par Patient was seen on 2022, CT chest in 2022 and 2022 reviewed and explained to patient and her family member, RUL nodule increase in size, I recommended a Flexible Bronchoscopy, Right VATS, Lung resection, RUL with IR marking. Risks and benefits and alternatives explained to patient, all questions answered, patient would like to think about it and call back for her decision. Will need PET/CT, PFTs, and lung perfusion scan, cardiac clearance if patient wants to proceed with surgery. \par \par PET/CT on 10/04/2022:\par - Status post interval left upper lobectomy and resection of previously seen FDG avid mixed groundglass nodule. Non-FDG avid postsurgical changes.\par - New FDG avid right upper lung nodule which is unchanged from recent CT where it measured 0.9 cm (SUV 3.0; image 78). Stable 0.5 cm groundglass nodule in the periphery of the right upper lobe (image 89), 0.2 cm nodule in the right anterior apex (image 74) and 0.4 cm nodular opacity in the right lung base (image 120) , too small to characterize.\par - Resolution of focal uptake in the marrow of the right humeral diaphysis.\par \par Patient was seen on 10/25/2022, Return for telehealth visit to discuss requested PFT and VQ scan with cardiac clearance. \par \par Lung perfusion scan on 10/28/2022: \par - Right lun%;            Left lun%\par Right upper lun%         Left upper lun%\par Right mid lun%         Left mid lun%\par Right lower lun%         Left lower lun%\par \par PFTs on 11/10/2022: FVC 2.32 (88%), FEV1 1.48 (76%), DLCO 39%.\par \par I have reviewed the patient's medical records and diagnostic images at time of this office consultation and have made the following recommendation:\par 1. PFTs and lung perfusion reviewed and explained to patient and her family member, I recommended FB, Right VATS, lung resection, RUL nodule with IR marking. Risks and benefits and alternatives explained to patient, all questions answered, patient agreed to proceed with surgery.\par 2. Cardiac clearance and PST. \par \par I, VANESSA WilcoxIM, personally performed the evaluation and management (E/M) services for this established patient who follow up today with an existing condition.  That E/M includes conducting the examination, assessing all new/exacerbated/existing conditions, and establishing a plan of care.  Today, my ACP, Charisse Branch ANP-C, was here to observe my evaluation and management services for this existing condition to be followed going forward. \par

## 2022-11-17 ENCOUNTER — APPOINTMENT (OUTPATIENT)
Dept: NEUROLOGY | Facility: CLINIC | Age: 82
End: 2022-11-17

## 2022-11-17 VITALS
BODY MASS INDEX: 21.34 KG/M2 | SYSTOLIC BLOOD PRESSURE: 122 MMHG | WEIGHT: 125 LBS | HEART RATE: 94 BPM | HEIGHT: 64 IN | DIASTOLIC BLOOD PRESSURE: 76 MMHG

## 2022-11-17 VITALS
DIASTOLIC BLOOD PRESSURE: 76 MMHG | SYSTOLIC BLOOD PRESSURE: 122 MMHG | HEART RATE: 94 BPM | HEIGHT: 64 IN | BODY MASS INDEX: 21.34 KG/M2 | WEIGHT: 125 LBS

## 2022-11-17 PROCEDURE — 99214 OFFICE O/P EST MOD 30 MIN: CPT

## 2022-11-17 NOTE — HISTORY OF PRESENT ILLNESS
[FreeTextEntry1] : This patient is seen for an office visit.  She is neurologically basically asymptomatic status post remote CVA dating back to 4/23/2021 when she had a transient episode of garbled speech with a positive brain MRI on 5/7/2021 revealing subacute lacunar infarct of the right centrum semiovale.  There was also chronic microvascular change.  CT angiogram of the head and neck was normal at that time.  She had an ILR placed without any evidence of cardiac arrhythmia to explain the event.  She has a history of hypertension hyperlipidemia and she is a former cigarette smoker.  She describes a chronic unsteady gait.  She takes aspirin 81 mg once daily statin medication and antihypertensives per\par \par She has a history of lung cancer and she apparently has a recurrence and she will have a partial resection which is being scheduled by the thoracic surgeon.

## 2022-11-17 NOTE — PHYSICAL EXAM
[FreeTextEntry1] : Head:  Normocephalic Neck: Supple nontender no carotid bruits. \par \par Mental Status:  Alert Oriented X3 Speech normal and no aphasia or dysarthria.\par \par Cranial Nerves:  PERRL,  Visual Fields full  EOMI no diplopia no ptosis no nystagmus, V through XII intact.\par \par Motor:  No drift, normal strength tone and coordination and no focal atrophy. No abnormal movements. No dysmetria.  Normal rapid alternating movements. \par \par DTRs: Symmetric and 2+.  Plantars flexor.  No Clonus.\par \par Sensory:  Normal testing with pin light touch and vibration and  Joint position sense.  Normal DSS to touch.\par \par Gait: Her gait appears unsteady today.\par

## 2022-11-17 NOTE — ASSESSMENT
[FreeTextEntry1] : Impression: 81-year-old woman with a history of hypertension hyperlipidemia former smoker status post surgery for lung cancer with an apparent recurrence planning further surgery status post CVA April 2022 and abnormal brain MRI 5/7/2021 for a tiny subacute lacunar infarction of the right centrum semiovale.  There was also chronic microvascular change.  Today's neurological exam reveals an unsteady gait which the patient states is chronic but may be more pronounced.  Status post ILR without arrhythmia in the setting of CVA.\par \par Recommendations: MRI brain in this setting for comparison.  Office follow-up in 1 year.\par

## 2022-11-18 ENCOUNTER — NON-APPOINTMENT (OUTPATIENT)
Age: 82
End: 2022-11-18

## 2022-11-21 ENCOUNTER — APPOINTMENT (OUTPATIENT)
Dept: INFUSION THERAPY | Facility: HOSPITAL | Age: 82
End: 2022-11-21

## 2022-11-21 ENCOUNTER — APPOINTMENT (OUTPATIENT)
Dept: HEMATOLOGY ONCOLOGY | Facility: CLINIC | Age: 82
End: 2022-11-21

## 2022-11-29 ENCOUNTER — APPOINTMENT (OUTPATIENT)
Dept: CARDIOLOGY | Facility: CLINIC | Age: 82
End: 2022-11-29

## 2022-11-29 ENCOUNTER — NON-APPOINTMENT (OUTPATIENT)
Age: 82
End: 2022-11-29

## 2022-11-29 VITALS
BODY MASS INDEX: 21.34 KG/M2 | HEART RATE: 80 BPM | RESPIRATION RATE: 16 BRPM | OXYGEN SATURATION: 94 % | HEIGHT: 64 IN | WEIGHT: 125 LBS

## 2022-11-29 VITALS — HEART RATE: 84 BPM | DIASTOLIC BLOOD PRESSURE: 60 MMHG | SYSTOLIC BLOOD PRESSURE: 130 MMHG

## 2022-11-29 DIAGNOSIS — I10 ESSENTIAL (PRIMARY) HYPERTENSION: ICD-10-CM

## 2022-11-29 PROCEDURE — 99214 OFFICE O/P EST MOD 30 MIN: CPT

## 2022-11-29 PROCEDURE — 93000 ELECTROCARDIOGRAM COMPLETE: CPT

## 2022-11-29 RX ORDER — ESCITALOPRAM OXALATE 10 MG/1
10 TABLET ORAL
Qty: 90 | Refills: 0 | Status: ACTIVE | COMMUNITY
Start: 2022-06-17

## 2022-11-29 RX ORDER — VALSARTAN AND HYDROCHLOROTHIAZIDE 320; 12.5 MG/1; MG/1
320-12.5 TABLET, FILM COATED ORAL
Refills: 1 | Status: DISCONTINUED | COMMUNITY
Start: 2019-08-22 | End: 2022-11-29

## 2022-11-29 NOTE — ASSESSMENT
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

## 2022-11-29 NOTE — REASON FOR VISIT
[Other: ____] : [unfilled] [FreeTextEntry1] : Patient presents for cardiac clearance for a cancerous lung nodule.  She has history of a cryptogenic stroke approximately 18 months ago.  She had a implantable loop recorder placed which to this time has not revealed any atrial fibrillation.

## 2022-11-30 ENCOUNTER — APPOINTMENT (OUTPATIENT)
Dept: ELECTROPHYSIOLOGY | Facility: CLINIC | Age: 82
End: 2022-11-30

## 2022-11-30 ENCOUNTER — NON-APPOINTMENT (OUTPATIENT)
Age: 82
End: 2022-11-30

## 2022-11-30 PROCEDURE — 93298 REM INTERROG DEV EVAL SCRMS: CPT

## 2022-11-30 PROCEDURE — G2066: CPT

## 2023-01-02 ENCOUNTER — OUTPATIENT (OUTPATIENT)
Dept: OUTPATIENT SERVICES | Facility: HOSPITAL | Age: 83
LOS: 1 days | Discharge: HOME | End: 2023-01-02

## 2023-01-02 DIAGNOSIS — E53.8 DEFICIENCY OF OTHER SPECIFIED B GROUP VITAMINS: ICD-10-CM

## 2023-01-02 DIAGNOSIS — Z90.2 ACQUIRED ABSENCE OF LUNG [PART OF]: Chronic | ICD-10-CM

## 2023-01-04 ENCOUNTER — APPOINTMENT (OUTPATIENT)
Dept: ELECTROPHYSIOLOGY | Facility: CLINIC | Age: 83
End: 2023-01-04
Payer: MEDICARE

## 2023-01-04 ENCOUNTER — NON-APPOINTMENT (OUTPATIENT)
Age: 83
End: 2023-01-04

## 2023-01-04 PROCEDURE — G2066: CPT

## 2023-01-04 PROCEDURE — 93298 REM INTERROG DEV EVAL SCRMS: CPT

## 2023-01-06 ENCOUNTER — APPOINTMENT (OUTPATIENT)
Dept: HEMATOLOGY ONCOLOGY | Facility: CLINIC | Age: 83
End: 2023-01-06

## 2023-01-08 ENCOUNTER — INPATIENT (INPATIENT)
Facility: HOSPITAL | Age: 83
LOS: 3 days | Discharge: HOME | DRG: 871 | End: 2023-01-12
Attending: INTERNAL MEDICINE | Admitting: FAMILY MEDICINE
Payer: MEDICARE

## 2023-01-08 VITALS
RESPIRATION RATE: 16 BRPM | HEART RATE: 107 BPM | WEIGHT: 125 LBS | DIASTOLIC BLOOD PRESSURE: 62 MMHG | OXYGEN SATURATION: 97 % | SYSTOLIC BLOOD PRESSURE: 91 MMHG | HEIGHT: 64 IN | TEMPERATURE: 100 F

## 2023-01-08 DIAGNOSIS — J18.9 PNEUMONIA, UNSPECIFIED ORGANISM: ICD-10-CM

## 2023-01-08 DIAGNOSIS — Z90.2 ACQUIRED ABSENCE OF LUNG [PART OF]: Chronic | ICD-10-CM

## 2023-01-08 LAB
ALBUMIN SERPL ELPH-MCNC: 3.3 G/DL — SIGNIFICANT CHANGE UP (ref 3.3–5)
ALP SERPL-CCNC: 91 U/L — SIGNIFICANT CHANGE UP (ref 40–120)
ALT FLD-CCNC: 14 U/L — SIGNIFICANT CHANGE UP (ref 10–45)
ANION GAP SERPL CALC-SCNC: 9 MMOL/L — SIGNIFICANT CHANGE UP (ref 5–17)
APPEARANCE UR: CLEAR — SIGNIFICANT CHANGE UP
APTT BLD: 26.3 SEC — LOW (ref 27.5–35.5)
AST SERPL-CCNC: 12 U/L — SIGNIFICANT CHANGE UP (ref 10–40)
BASOPHILS # BLD AUTO: 0 K/UL — SIGNIFICANT CHANGE UP (ref 0–0.2)
BASOPHILS NFR BLD AUTO: 0 % — SIGNIFICANT CHANGE UP (ref 0–2)
BILIRUB SERPL-MCNC: 0.6 MG/DL — SIGNIFICANT CHANGE UP (ref 0.2–1.2)
BILIRUB UR-MCNC: NEGATIVE — SIGNIFICANT CHANGE UP
BUN SERPL-MCNC: 24 MG/DL — HIGH (ref 7–23)
CALCIUM SERPL-MCNC: 10 MG/DL — SIGNIFICANT CHANGE UP (ref 8.4–10.5)
CHLORIDE SERPL-SCNC: 98 MMOL/L — SIGNIFICANT CHANGE UP (ref 96–108)
CO2 SERPL-SCNC: 26 MMOL/L — SIGNIFICANT CHANGE UP (ref 22–31)
COLOR SPEC: YELLOW — SIGNIFICANT CHANGE UP
CREAT SERPL-MCNC: 1.1 MG/DL — SIGNIFICANT CHANGE UP (ref 0.5–1.3)
DIFF PNL FLD: NEGATIVE — SIGNIFICANT CHANGE UP
EGFR: 51 ML/MIN/1.73M2 — LOW
EOSINOPHIL # BLD AUTO: 0 K/UL — SIGNIFICANT CHANGE UP (ref 0–0.5)
EOSINOPHIL NFR BLD AUTO: 0 % — SIGNIFICANT CHANGE UP (ref 0–6)
EPI CELLS # UR: SIGNIFICANT CHANGE UP
GLUCOSE SERPL-MCNC: 153 MG/DL — HIGH (ref 70–99)
GLUCOSE UR QL: NEGATIVE — SIGNIFICANT CHANGE UP
HCT VFR BLD CALC: 35.1 % — SIGNIFICANT CHANGE UP (ref 34.5–45)
HGB BLD-MCNC: 11.4 G/DL — LOW (ref 11.5–15.5)
INR BLD: 1.18 RATIO — HIGH (ref 0.88–1.16)
KETONES UR-MCNC: NEGATIVE — SIGNIFICANT CHANGE UP
LACTATE SERPL-SCNC: 0.8 MMOL/L — SIGNIFICANT CHANGE UP (ref 0.7–2)
LACTATE SERPL-SCNC: 2.1 MMOL/L — HIGH (ref 0.7–2)
LEUKOCYTE ESTERASE UR-ACNC: NEGATIVE — SIGNIFICANT CHANGE UP
LYMPHOCYTES # BLD AUTO: 1.99 K/UL — SIGNIFICANT CHANGE UP (ref 1–3.3)
LYMPHOCYTES # BLD AUTO: 5 % — LOW (ref 13–44)
MANUAL SMEAR VERIFICATION: SIGNIFICANT CHANGE UP
MCHC RBC-ENTMCNC: 30.6 PG — SIGNIFICANT CHANGE UP (ref 27–34)
MCHC RBC-ENTMCNC: 32.5 GM/DL — SIGNIFICANT CHANGE UP (ref 32–36)
MCV RBC AUTO: 94.4 FL — SIGNIFICANT CHANGE UP (ref 80–100)
MONOCYTES # BLD AUTO: 3.59 K/UL — HIGH (ref 0–0.9)
MONOCYTES NFR BLD AUTO: 9 % — SIGNIFICANT CHANGE UP (ref 2–14)
NEUTROPHILS # BLD AUTO: 34.31 K/UL — HIGH (ref 1.8–7.4)
NEUTROPHILS NFR BLD AUTO: 84 % — HIGH (ref 43–77)
NEUTS BAND # BLD: 2 % — SIGNIFICANT CHANGE UP (ref 0–8)
NITRITE UR-MCNC: NEGATIVE — SIGNIFICANT CHANGE UP
NRBC # BLD: 0 /100 — SIGNIFICANT CHANGE UP (ref 0–0)
NT-PROBNP SERPL-SCNC: 282 PG/ML — SIGNIFICANT CHANGE UP (ref 0–300)
PH UR: 5 — SIGNIFICANT CHANGE UP (ref 5–8)
PLAT MORPH BLD: NORMAL — SIGNIFICANT CHANGE UP
PLATELET # BLD AUTO: 450 K/UL — HIGH (ref 150–400)
POTASSIUM SERPL-MCNC: 3.8 MMOL/L — SIGNIFICANT CHANGE UP (ref 3.5–5.3)
POTASSIUM SERPL-SCNC: 3.8 MMOL/L — SIGNIFICANT CHANGE UP (ref 3.5–5.3)
PROCALCITONIN SERPL-MCNC: 0.22 NG/ML — HIGH
PROT SERPL-MCNC: 7.8 G/DL — SIGNIFICANT CHANGE UP (ref 6–8.3)
PROT UR-MCNC: 30 MG/DL
PROTHROM AB SERPL-ACNC: 13.7 SEC — HIGH (ref 10.5–13.4)
RAPID RVP RESULT: SIGNIFICANT CHANGE UP
RBC # BLD: 3.72 M/UL — LOW (ref 3.8–5.2)
RBC # FLD: 13.9 % — SIGNIFICANT CHANGE UP (ref 10.3–14.5)
RBC BLD AUTO: NORMAL — SIGNIFICANT CHANGE UP
RBC CASTS # UR COMP ASSIST: NEGATIVE /HPF — SIGNIFICANT CHANGE UP (ref 0–4)
SARS-COV-2 RNA SPEC QL NAA+PROBE: SIGNIFICANT CHANGE UP
SODIUM SERPL-SCNC: 133 MMOL/L — LOW (ref 135–145)
SP GR SPEC: 1.01 — SIGNIFICANT CHANGE UP (ref 1.01–1.02)
TROPONIN I, HIGH SENSITIVITY RESULT: 12.5 NG/L — SIGNIFICANT CHANGE UP
TROPONIN I, HIGH SENSITIVITY RESULT: 13.7 NG/L — SIGNIFICANT CHANGE UP
UROBILINOGEN FLD QL: NEGATIVE — SIGNIFICANT CHANGE UP
WBC # BLD: 39.89 K/UL — HIGH (ref 3.8–10.5)
WBC # FLD AUTO: 39.89 K/UL — HIGH (ref 3.8–10.5)
WBC UR QL: NEGATIVE /HPF — SIGNIFICANT CHANGE UP (ref 0–5)

## 2023-01-08 PROCEDURE — 99497 ADVNCD CARE PLAN 30 MIN: CPT | Mod: 25

## 2023-01-08 PROCEDURE — 93010 ELECTROCARDIOGRAM REPORT: CPT

## 2023-01-08 PROCEDURE — 99223 1ST HOSP IP/OBS HIGH 75: CPT

## 2023-01-08 PROCEDURE — 71045 X-RAY EXAM CHEST 1 VIEW: CPT | Mod: 26

## 2023-01-08 PROCEDURE — 71275 CT ANGIOGRAPHY CHEST: CPT | Mod: 26,MA

## 2023-01-08 PROCEDURE — 99285 EMERGENCY DEPT VISIT HI MDM: CPT | Mod: FS,CS

## 2023-01-08 RX ORDER — ATORVASTATIN CALCIUM 80 MG/1
40 TABLET, FILM COATED ORAL AT BEDTIME
Refills: 0 | Status: DISCONTINUED | OUTPATIENT
Start: 2023-01-08 | End: 2023-01-12

## 2023-01-08 RX ORDER — SODIUM CHLORIDE 9 MG/ML
1000 INJECTION INTRAMUSCULAR; INTRAVENOUS; SUBCUTANEOUS ONCE
Refills: 0 | Status: COMPLETED | OUTPATIENT
Start: 2023-01-08 | End: 2023-01-08

## 2023-01-08 RX ORDER — CEFTRIAXONE 500 MG/1
1000 INJECTION, POWDER, FOR SOLUTION INTRAMUSCULAR; INTRAVENOUS ONCE
Refills: 0 | Status: COMPLETED | OUTPATIENT
Start: 2023-01-08 | End: 2023-01-08

## 2023-01-08 RX ORDER — CEFTRIAXONE 500 MG/1
1000 INJECTION, POWDER, FOR SOLUTION INTRAMUSCULAR; INTRAVENOUS EVERY 24 HOURS
Refills: 0 | Status: DISCONTINUED | OUTPATIENT
Start: 2023-01-08 | End: 2023-01-12

## 2023-01-08 RX ORDER — ACETAMINOPHEN 500 MG
975 TABLET ORAL ONCE
Refills: 0 | Status: COMPLETED | OUTPATIENT
Start: 2023-01-08 | End: 2023-01-08

## 2023-01-08 RX ORDER — PREGABALIN 225 MG/1
1000 CAPSULE ORAL DAILY
Refills: 0 | Status: DISCONTINUED | OUTPATIENT
Start: 2023-01-08 | End: 2023-01-12

## 2023-01-08 RX ORDER — ASPIRIN/CALCIUM CARB/MAGNESIUM 324 MG
81 TABLET ORAL DAILY
Refills: 0 | Status: DISCONTINUED | OUTPATIENT
Start: 2023-01-08 | End: 2023-01-12

## 2023-01-08 RX ORDER — SODIUM CHLORIDE 9 MG/ML
1000 INJECTION INTRAMUSCULAR; INTRAVENOUS; SUBCUTANEOUS
Refills: 0 | Status: DISCONTINUED | OUTPATIENT
Start: 2023-01-08 | End: 2023-01-11

## 2023-01-08 RX ORDER — PREGABALIN 225 MG/1
1 CAPSULE ORAL
Qty: 0 | Refills: 0 | DISCHARGE

## 2023-01-08 RX ORDER — LOSARTAN POTASSIUM 100 MG/1
100 TABLET, FILM COATED ORAL DAILY
Refills: 0 | Status: DISCONTINUED | OUTPATIENT
Start: 2023-01-08 | End: 2023-01-12

## 2023-01-08 RX ORDER — ESCITALOPRAM OXALATE 10 MG/1
1 TABLET, FILM COATED ORAL
Qty: 0 | Refills: 0 | DISCHARGE

## 2023-01-08 RX ORDER — ACETAMINOPHEN 500 MG
650 TABLET ORAL EVERY 6 HOURS
Refills: 0 | Status: DISCONTINUED | OUTPATIENT
Start: 2023-01-08 | End: 2023-01-12

## 2023-01-08 RX ORDER — ENOXAPARIN SODIUM 100 MG/ML
40 INJECTION SUBCUTANEOUS EVERY 24 HOURS
Refills: 0 | Status: DISCONTINUED | OUTPATIENT
Start: 2023-01-08 | End: 2023-01-12

## 2023-01-08 RX ORDER — HYDROCHLOROTHIAZIDE 25 MG
1 TABLET ORAL
Qty: 0 | Refills: 0 | DISCHARGE

## 2023-01-08 RX ORDER — SODIUM CHLORIDE 9 MG/ML
1000 INJECTION, SOLUTION INTRAVENOUS ONCE
Refills: 0 | Status: DISCONTINUED | OUTPATIENT
Start: 2023-01-08 | End: 2023-01-08

## 2023-01-08 RX ORDER — GUAIFENESIN/DEXTROMETHORPHAN 600MG-30MG
10 TABLET, EXTENDED RELEASE 12 HR ORAL EVERY 4 HOURS
Refills: 0 | Status: DISCONTINUED | OUTPATIENT
Start: 2023-01-08 | End: 2023-01-12

## 2023-01-08 RX ORDER — AZITHROMYCIN 500 MG/1
500 TABLET, FILM COATED ORAL ONCE
Refills: 0 | Status: COMPLETED | OUTPATIENT
Start: 2023-01-08 | End: 2023-01-08

## 2023-01-08 RX ORDER — LOSARTAN POTASSIUM 100 MG/1
1 TABLET, FILM COATED ORAL
Qty: 0 | Refills: 0 | DISCHARGE

## 2023-01-08 RX ORDER — ESCITALOPRAM OXALATE 10 MG/1
10 TABLET, FILM COATED ORAL DAILY
Refills: 0 | Status: DISCONTINUED | OUTPATIENT
Start: 2023-01-08 | End: 2023-01-12

## 2023-01-08 RX ADMIN — SODIUM CHLORIDE 60 MILLILITER(S): 9 INJECTION INTRAMUSCULAR; INTRAVENOUS; SUBCUTANEOUS at 19:48

## 2023-01-08 RX ADMIN — SODIUM CHLORIDE 1000 MILLILITER(S): 9 INJECTION INTRAMUSCULAR; INTRAVENOUS; SUBCUTANEOUS at 16:20

## 2023-01-08 RX ADMIN — AZITHROMYCIN 255 MILLIGRAM(S): 500 TABLET, FILM COATED ORAL at 16:20

## 2023-01-08 RX ADMIN — AZITHROMYCIN 500 MILLIGRAM(S): 500 TABLET, FILM COATED ORAL at 17:20

## 2023-01-08 RX ADMIN — CEFTRIAXONE 100 MILLIGRAM(S): 500 INJECTION, POWDER, FOR SOLUTION INTRAMUSCULAR; INTRAVENOUS at 21:05

## 2023-01-08 RX ADMIN — SODIUM CHLORIDE 1000 MILLILITER(S): 9 INJECTION INTRAMUSCULAR; INTRAVENOUS; SUBCUTANEOUS at 17:20

## 2023-01-08 RX ADMIN — Medication 975 MILLIGRAM(S): at 14:38

## 2023-01-08 RX ADMIN — ENOXAPARIN SODIUM 40 MILLIGRAM(S): 100 INJECTION SUBCUTANEOUS at 21:05

## 2023-01-08 RX ADMIN — CEFTRIAXONE 100 MILLIGRAM(S): 500 INJECTION, POWDER, FOR SOLUTION INTRAMUSCULAR; INTRAVENOUS at 15:48

## 2023-01-08 RX ADMIN — CEFTRIAXONE 1000 MILLIGRAM(S): 500 INJECTION, POWDER, FOR SOLUTION INTRAMUSCULAR; INTRAVENOUS at 15:10

## 2023-01-08 RX ADMIN — SODIUM CHLORIDE 2000 MILLILITER(S): 9 INJECTION INTRAMUSCULAR; INTRAVENOUS; SUBCUTANEOUS at 14:07

## 2023-01-08 RX ADMIN — SODIUM CHLORIDE 1000 MILLILITER(S): 9 INJECTION INTRAMUSCULAR; INTRAVENOUS; SUBCUTANEOUS at 15:07

## 2023-01-08 RX ADMIN — ATORVASTATIN CALCIUM 40 MILLIGRAM(S): 80 TABLET, FILM COATED ORAL at 21:05

## 2023-01-08 RX ADMIN — Medication 975 MILLIGRAM(S): at 14:08

## 2023-01-08 NOTE — ED ADULT TRIAGE NOTE - CHIEF COMPLAINT QUOTE
headache, body aches, sob and chills x 2 weeks - pt was on prednisone for 6 days with no improvement

## 2023-01-08 NOTE — ED PROVIDER NOTE - CLINICAL SUMMARY MEDICAL DECISION MAKING FREE TEXT BOX
82-year-old female with a medical history including COPD, former tobacco smoker, lung cancer status post left upper lobectomy approximately 3 years ago, HTN, HLD, complaining of approximately 2 weeks of intermittent chills, general malaise, muscle aches, sore throat, cough with production, congestion. Intermittent shortness of breath with coughing.  Denies any chest pain, abdominal pain, nausea, vomiting, diarrhea, dysuria, hematuria, rashes or any other complaints.  Patient reports she went to an urgent care approximately 2 weeks ago was given Flonase and 6 days of prednisone, denies any significant improvement.    Lungs CTA B/L all fields, SpO2 97% on RA, , Temp 99.5, will do Sepsis workup including IVF, CBC, CMP, Coags, Lactate, UA, UC, BC, CXR, ECG, RVP, re-assess. 82-year-old female with a medical history including COPD, former tobacco smoker, lung cancer status post left upper lobectomy approximately 3 years ago, HTN, HLD, complaining of approximately 2 weeks of intermittent chills, general malaise, muscle aches, sore throat, cough with production, congestion. Intermittent shortness of breath with coughing.  Denies any chest pain, abdominal pain, nausea, vomiting, diarrhea, dysuria, hematuria, rashes or any other complaints.  Patient reports she went to an urgent care approximately 2 weeks ago was given Flonase and 6 days of prednisone, denies any significant improvement.  No recent hospitalizations.     Lungs CTA B/L all fields, SpO2 97% on RA, , Temp 99.5, will do Sepsis workup including IVF, CBC, CMP, Coags, Lactate, UA, UC, BC, CXR, ECG, RVP, re-assess.    Left Lower Lobe consolidation on CT, WBC 39.9, RVP negative. Ordered procalcitonin level, will cover with abx. Discussed with patient, given oncology hx, elevated WBC count and pna, patient agreed with plan to admit to hospital for further evaluation.

## 2023-01-08 NOTE — ED PROVIDER NOTE - OBJECTIVE STATEMENT
82-year-old female with a medical history including COPD, former tobacco smoker, lung cancer status post left upper lobectomy approximately 3 years ago, HTN, HLD, complaining of approximately 2 weeks of intermittent chills, general malaise, muscle aches, sore throat, cough with production, congestion. Intermittent shortness of breath with coughing.  Denies any chest pain, abdominal pain, nausea, vomiting, diarrhea, dysuria, hematuria, rashes or any other complaints.  Patient reports she went to an urgent care approximately 2 weeks ago was given Flonase and 6 days of prednisone, denies any significant improvement.

## 2023-01-08 NOTE — H&P ADULT - HISTORY OF PRESENT ILLNESS
81 yo F with pmh copd not on home O2, HTN, HLD, Lung Cancer s/p lung resection in 2018, former smoker, OA, anxiety and depression, who presents with 2 weeks of progressive productive cough, weakness, malaise, chills body aches and sore throat. Pt denies any other acute complaints. Had gone to urgent care which she was given flonase and steroids felt brief relief but continued to feel ill which she decided to come to ED.       In the ED pt was found to meet sepsis criteria with elevated wbc and fever 101, ct chest: demonstrated Small new consolidation anterior medial left lower lobe, most likely represents   atelectasis, superimposed infection not excluded. pt was given iv antibx cftx and azithromycin

## 2023-01-08 NOTE — H&P ADULT - CONVERSATION DETAILS
ADVANCED CARE PLANNING NOTE:  I met with patient and the primary care giver / designated health care proxy.  Discussed in details about current diagnosis of PNA, advanced age, comorbid conditions, treatment options and prognosis.   Also discussed about Cardiac and respiratory resuscitative measures including CPR, vasopressors and   ventilator support via mechanical intubation.   All risks and benefits discussed.   Patient wishes and goals of care were addressed inclusive of: DNR/DNI  What matters most to them regarding their current health status, emotional support and reassurance offered.  Medication management: Home medications reviewed and reconciled; current active were reviewed  All questions and concerns were answered and addressed.   About 17 minutes was spent in advanced Care Planning.

## 2023-01-08 NOTE — ED PROVIDER NOTE - NS ED ATTENDING STATEMENT MOD
This was a shared visit with the MITA. I reviewed and verified the documentation and independently performed the documented:

## 2023-01-08 NOTE — H&P ADULT - ASSESSMENT
83 yo F with pmh copd not on home O2, HTN, HLD, Lung Cancer s/p lung resection in 2018, former smoker, OA, anxiety and depression, who presents with 2 weeks of progressive productive cough, weakness, malaise, chills body aches and sore throat admitted for sepsis sec to CAP    #sepsis  #CAP  #advanced age  #COPD  #hyponatremia  #MOLST completed     -cont iv antibx  -f/u cultures  -PT eval  -trend wbc  -monitor clinical course  -trend na  -dvt ppx lovenox

## 2023-01-08 NOTE — ED PROVIDER NOTE - HEME/LYMPH NEGATIVE STATEMENT, MLM
no anemia, no easy bruising, no jaundice, no swollen lymph nodes. Dtr did mention PMD concern of "simmering myeloma"

## 2023-01-08 NOTE — ED PROVIDER NOTE - ATTENDING APP SHARED VISIT CONTRIBUTION OF CARE
I have personally performed a face to face medical and diagnostic evaluation of the patient. I have discussed with and reviewed the ACP's note and agree with the History, ROS, Physical Exam and MDM unless otherwise indicated. A brief summary of my personal evaluation and impression can be found below.     82-year-old female with a medical history including COPD, former tobacco smoker, lung cancer status post left upper lobectomy approximately 3 years ago, HTN, HLD, complaining of approximately 2 weeks of intermittent chills, general malaise, muscle aches, sore throat, cough with production, congestion. Intermittent shortness of breath with coughing.  No other complaints. Patient reports she went to an urgent care approximately 2 weeks ago was given Flonase and 6 days of prednisone, denies any significant improvement.  No recent hospitalizations. On exam, VSS w no wob. Rhonchi R>L lung fields. Some concern for CAP. Cannot rule out PE given cancer history. Will obtain labs and CXR. Will likely need CT angio of chest as well. Likely need admission given failed outpt management. Reassess to dispo. Darin Varela, ED Attending

## 2023-01-08 NOTE — ED ADULT NURSE NOTE - NSICDXPASTMEDICALHX_GEN_ALL_CORE_FT
PAST MEDICAL HISTORY:  Anxiety and depression     Arthritis     COPD (chronic obstructive pulmonary disease)     Emphysema lung     Former smoker     HLD (hyperlipidemia)     HTN (hypertension)     Lower back pain     Lung cancer     Solitary pulmonary nodule

## 2023-01-08 NOTE — H&P ADULT - NSHPSOCIALHISTORY_GEN_ALL_CORE
lives in a house in [place], lives alone, retired ,  lives in a house in [place], lives alone, retired , , COVID, flu and PCV all up to date

## 2023-01-08 NOTE — H&P ADULT - NSCORESITESY/N_GEN_A_CORE_RD
lisinopril (ZESTRIL) 10 MG tablet-last refill 2/22/2022 #30    metformin (GLUCOPHAGE) 1000 MG tablet-last refill 9/29/2021 #180 with 1 refill.    Last visit 3/22/2022.  No pending.  Due around 12/22/2022.    Rx approved per protocol and sent to pt's preferred pharmacy.     Yes

## 2023-01-08 NOTE — ED ADULT NURSE NOTE - OBJECTIVE STATEMENT
Pt came from home, BIB daughter for c/o generalized weakness, body aches, cough, sore throat and congestion x 2 weeks. Pt with hx COPD, lung ca, left upper lobectomy, HTN and HLD. Pt reports that 2 weeks ago she went to an urgent care and was prescribed prednisone and flonase x 6 days. Pt came from home, BIB daughter for c/o generalized weakness, body aches, cough, sore throat and congestion x 2 weeks. Pt with hx COPD, lung ca, left upper lobectomy, HTN and HLD. Pt reports that 2 weeks ago she went to an urgent care and was prescribed prednisone and flonase x 6 days. Pt states that shes had minimal improvement with the meds. Pt denies chest pain, sob, n/v/d or any other symptoms.

## 2023-01-09 LAB
ALBUMIN SERPL ELPH-MCNC: 2.6 G/DL — LOW (ref 3.3–5)
ALP SERPL-CCNC: 73 U/L — SIGNIFICANT CHANGE UP (ref 40–120)
ALT FLD-CCNC: 11 U/L — SIGNIFICANT CHANGE UP (ref 10–45)
ANION GAP SERPL CALC-SCNC: 11 MMOL/L — SIGNIFICANT CHANGE UP (ref 5–17)
AST SERPL-CCNC: 15 U/L — SIGNIFICANT CHANGE UP (ref 10–40)
BASOPHILS # BLD AUTO: 0.08 K/UL — SIGNIFICANT CHANGE UP (ref 0–0.2)
BASOPHILS NFR BLD AUTO: 0.3 % — SIGNIFICANT CHANGE UP (ref 0–2)
BILIRUB SERPL-MCNC: 0.4 MG/DL — SIGNIFICANT CHANGE UP (ref 0.2–1.2)
BUN SERPL-MCNC: 17 MG/DL — SIGNIFICANT CHANGE UP (ref 7–23)
CALCIUM SERPL-MCNC: 8.9 MG/DL — SIGNIFICANT CHANGE UP (ref 8.4–10.5)
CHLORIDE SERPL-SCNC: 109 MMOL/L — HIGH (ref 96–108)
CO2 SERPL-SCNC: 22 MMOL/L — SIGNIFICANT CHANGE UP (ref 22–31)
CREAT SERPL-MCNC: 0.95 MG/DL — SIGNIFICANT CHANGE UP (ref 0.5–1.3)
CULTURE RESULTS: SIGNIFICANT CHANGE UP
EGFR: 60 ML/MIN/1.73M2 — SIGNIFICANT CHANGE UP
EOSINOPHIL # BLD AUTO: 0.06 K/UL — SIGNIFICANT CHANGE UP (ref 0–0.5)
EOSINOPHIL NFR BLD AUTO: 0.3 % — SIGNIFICANT CHANGE UP (ref 0–6)
GLUCOSE SERPL-MCNC: 129 MG/DL — HIGH (ref 70–99)
HCT VFR BLD CALC: 26.6 % — LOW (ref 34.5–45)
HCT VFR BLD CALC: 26.7 % — LOW (ref 34.5–45)
HGB BLD-MCNC: 8.6 G/DL — LOW (ref 11.5–15.5)
HGB BLD-MCNC: 8.8 G/DL — LOW (ref 11.5–15.5)
IMM GRANULOCYTES NFR BLD AUTO: 1.3 % — HIGH (ref 0–0.9)
LEGIONELLA AG UR QL: NEGATIVE — SIGNIFICANT CHANGE UP
LYMPHOCYTES # BLD AUTO: 10.5 % — LOW (ref 13–44)
LYMPHOCYTES # BLD AUTO: 2.5 K/UL — SIGNIFICANT CHANGE UP (ref 1–3.3)
MCHC RBC-ENTMCNC: 30.8 PG — SIGNIFICANT CHANGE UP (ref 27–34)
MCHC RBC-ENTMCNC: 30.8 PG — SIGNIFICANT CHANGE UP (ref 27–34)
MCHC RBC-ENTMCNC: 32.3 GM/DL — SIGNIFICANT CHANGE UP (ref 32–36)
MCHC RBC-ENTMCNC: 33 GM/DL — SIGNIFICANT CHANGE UP (ref 32–36)
MCV RBC AUTO: 93.4 FL — SIGNIFICANT CHANGE UP (ref 80–100)
MCV RBC AUTO: 95.3 FL — SIGNIFICANT CHANGE UP (ref 80–100)
MONOCYTES # BLD AUTO: 2.68 K/UL — HIGH (ref 0–0.9)
MONOCYTES NFR BLD AUTO: 11.3 % — SIGNIFICANT CHANGE UP (ref 2–14)
NEUTROPHILS # BLD AUTO: 18.18 K/UL — HIGH (ref 1.8–7.4)
NEUTROPHILS NFR BLD AUTO: 76.3 % — SIGNIFICANT CHANGE UP (ref 43–77)
NRBC # BLD: 0 /100 WBCS — SIGNIFICANT CHANGE UP (ref 0–0)
NRBC # BLD: 0 /100 WBCS — SIGNIFICANT CHANGE UP (ref 0–0)
PLATELET # BLD AUTO: 346 K/UL — SIGNIFICANT CHANGE UP (ref 150–400)
PLATELET # BLD AUTO: 361 K/UL — SIGNIFICANT CHANGE UP (ref 150–400)
POTASSIUM SERPL-MCNC: 3.7 MMOL/L — SIGNIFICANT CHANGE UP (ref 3.5–5.3)
POTASSIUM SERPL-SCNC: 3.7 MMOL/L — SIGNIFICANT CHANGE UP (ref 3.5–5.3)
PROCALCITONIN SERPL-MCNC: 0.25 NG/ML — HIGH
PROT SERPL-MCNC: 6.3 G/DL — SIGNIFICANT CHANGE UP (ref 6–8.3)
RBC # BLD: 2.79 M/UL — LOW (ref 3.8–5.2)
RBC # BLD: 2.86 M/UL — LOW (ref 3.8–5.2)
RBC # FLD: 14 % — SIGNIFICANT CHANGE UP (ref 10.3–14.5)
RBC # FLD: 14.1 % — SIGNIFICANT CHANGE UP (ref 10.3–14.5)
SODIUM SERPL-SCNC: 142 MMOL/L — SIGNIFICANT CHANGE UP (ref 135–145)
SPECIMEN SOURCE: SIGNIFICANT CHANGE UP
VIT B12 SERPL-MCNC: 865 PG/ML — SIGNIFICANT CHANGE UP (ref 232–1245)
WBC # BLD: 23.81 K/UL — HIGH (ref 3.8–10.5)
WBC # BLD: 23.96 K/UL — HIGH (ref 3.8–10.5)
WBC # FLD AUTO: 23.81 K/UL — HIGH (ref 3.8–10.5)
WBC # FLD AUTO: 23.96 K/UL — HIGH (ref 3.8–10.5)

## 2023-01-09 PROCEDURE — 99233 SBSQ HOSP IP/OBS HIGH 50: CPT

## 2023-01-09 RX ORDER — ALBUTEROL 90 UG/1
2 AEROSOL, METERED ORAL EVERY 6 HOURS
Refills: 0 | Status: DISCONTINUED | OUTPATIENT
Start: 2023-01-09 | End: 2023-01-12

## 2023-01-09 RX ADMIN — CEFTRIAXONE 100 MILLIGRAM(S): 500 INJECTION, POWDER, FOR SOLUTION INTRAMUSCULAR; INTRAVENOUS at 21:50

## 2023-01-09 RX ADMIN — ATORVASTATIN CALCIUM 40 MILLIGRAM(S): 80 TABLET, FILM COATED ORAL at 21:50

## 2023-01-09 RX ADMIN — SODIUM CHLORIDE 60 MILLILITER(S): 9 INJECTION INTRAMUSCULAR; INTRAVENOUS; SUBCUTANEOUS at 10:35

## 2023-01-09 RX ADMIN — ESCITALOPRAM OXALATE 10 MILLIGRAM(S): 10 TABLET, FILM COATED ORAL at 10:36

## 2023-01-09 RX ADMIN — PREGABALIN 1000 MICROGRAM(S): 225 CAPSULE ORAL at 10:36

## 2023-01-09 RX ADMIN — ENOXAPARIN SODIUM 40 MILLIGRAM(S): 100 INJECTION SUBCUTANEOUS at 21:50

## 2023-01-09 RX ADMIN — Medication 81 MILLIGRAM(S): at 10:36

## 2023-01-09 NOTE — PROGRESS NOTE ADULT - ASSESSMENT
83 y/o F with PMH COPD not on home O2, HTN, HLD, Lung Cancer s/p lung resection in 2018, former smoker, OA, anxiety and depression admitted for sepsis due to CAP.    #Sepsis due to CAP - improving  #COPD not on home O2  #Lung Cancer s/p resection  #Hyponatremia  -Tmax 100.2F. O2 94% on RA  -Continue CFTX (1/8- )  -Continue IVF  -Blood, urine cx, strept/legionella pending  -Sputum culture pending collection  -PT eval    #Anemia  -Hb 11.4 > 8.8, possibly dilutional. No active s/s bleeding. Follow repeat CBC 1400    #HTN  -Conitnue HCTZ 25mg qd, losartan 100mg qd    #HLD  -Continue lipitor    #Anxiety  #Depression  -Continue lexapro     DVT ppx - lovenox    Daughter Josy  called for routine update, no answer 1/9

## 2023-01-09 NOTE — PHYSICAL THERAPY INITIAL EVALUATION ADULT - PERTINENT HX OF CURRENT PROBLEM, REHAB EVAL
pt is an 83 yo female with PMH: copd not on home O2, HTN, HLD, Lung Cancer s/p lung resection in 2018, former smoker, OA, anxiety and depression, who presents with 2 weeks of progressive productive cough, weakness, malaise, chills body aches and sore throat admitted for sepsis sec to CAP

## 2023-01-09 NOTE — PHYSICAL THERAPY INITIAL EVALUATION ADULT - ADDITIONAL COMMENTS
pt lives alone in a private home, no steps to negotiate, pt is independent with mobility and ADLs, has a RW but does not use, pt drives and is relatively active, participates in pilates weekly, pt reports fatigue with longer distance ambulation

## 2023-01-09 NOTE — PROGRESS NOTE ADULT - SUBJECTIVE AND OBJECTIVE BOX
Patient is a 82y old  Female who presents with a chief complaint of CAP (2023 15:53)      Patient seen and examined at bedside. feels improved. denies headache, fever, chills, cp, n/v, abd pain.      ALLERGIES:  No Known Allergies    MEDICATIONS  (STANDING):  aspirin enteric coated 81 milliGRAM(s) Oral daily  atorvastatin 40 milliGRAM(s) Oral at bedtime  cefTRIAXone   IVPB 1000 milliGRAM(s) IV Intermittent every 24 hours  cyanocobalamin 1000 MICROGram(s) Oral daily  enoxaparin Injectable 40 milliGRAM(s) SubCutaneous every 24 hours  escitalopram 10 milliGRAM(s) Oral daily  hydrochlorothiazide 25 milliGRAM(s) Oral daily  losartan 100 milliGRAM(s) Oral daily  sodium chloride 0.9%. 1000 milliLiter(s) (60 mL/Hr) IV Continuous <Continuous>    MEDICATIONS  (PRN):  acetaminophen     Tablet .. 650 milliGRAM(s) Oral every 6 hours PRN Temp greater or equal to 38.5C (101.3F), Moderate Pain (4 - 6)  benzonatate 100 milliGRAM(s) Oral every 8 hours PRN Cough  guaifenesin/dextromethorphan Oral Liquid 10 milliLiter(s) Oral every 4 hours PRN Cough    Vital Signs Last 24 Hrs  T(F): 100.2 (2023 09:24), Max: 101.5 (2023 13:54)  HR: 93 (2023 09:24) (72 - 107)  BP: 106/61 (2023 09:24) (81/47 - 106/61)  RR: 14 (2023 09:24) (14 - 16)  SpO2: 94% (2023 09:24) (94% - 98%)  I&O's Summary    BMI (kg/m2): 21.4 (23 @ 12:57)  PHYSICAL EXAM:  General: NAD, A/O x 3  ENT: MMM, no scleral icterus  Neck: Supple, No JVD  Lungs: Comfortable, diminished at bases b/l  Cardio: RRR, S1/S2, No murmurs  Abdomen: Soft, Nontender, Nondistended; Bowel sounds present  Extremities: No calf tenderness, No pitting edema    LABS:                        8.8    23.81 )-----------( 361      ( 2023 07:15 )             26.7           142  |  109  |  17  ----------------------------<  129  3.7   |  22  |  0.95    Ca    8.9      2023 07:15    TPro  6.3  /  Alb  2.6  /  TBili  0.4  /  DBili  x   /  AST  15  /  ALT  11  /  AlkPhos  73         PT/INR - ( 2023 13:42 )   PT: 13.7 sec;   INR: 1.18 ratio         PTT - ( 2023 13:42 )  PTT:26.3 sec   Lactate, Blood: 0.8 mmol/L ( @ 15:48)  Lactate, Blood: 2.1 mmol/L ( @ 13:42)    CARDIAC MARKERS ( 2023 15:48 )  x     / 13.7 ng/L / x     / x     / x      CARDIAC MARKERS ( 2023 13:42 )  x     / 12.5 ng/L / x     / x     / x                            Urinalysis Basic - ( 2023 16:34 )    Color: Yellow / Appearance: Clear / S.010 / pH: x  Gluc: x / Ketone: Negative  / Bili: Negative / Urobili: Negative   Blood: x / Protein: 30 mg/dL / Nitrite: Negative   Leuk Esterase: Negative / RBC: Negative /HPF / WBC Negative /HPF   Sq Epi: x / Non Sq Epi: Neg.-Few / Bacteria: x            RADIOLOGY & ADDITIONAL TESTS: reviewed    Care Discussed with Consultants/Other Providers:

## 2023-01-10 LAB
ALBUMIN SERPL ELPH-MCNC: 2.5 G/DL — LOW (ref 3.3–5)
ALP SERPL-CCNC: 73 U/L — SIGNIFICANT CHANGE UP (ref 40–120)
ALT FLD-CCNC: 11 U/L — SIGNIFICANT CHANGE UP (ref 10–45)
ANION GAP SERPL CALC-SCNC: 9 MMOL/L — SIGNIFICANT CHANGE UP (ref 5–17)
AST SERPL-CCNC: 19 U/L — SIGNIFICANT CHANGE UP (ref 10–40)
BILIRUB SERPL-MCNC: 0.4 MG/DL — SIGNIFICANT CHANGE UP (ref 0.2–1.2)
BUN SERPL-MCNC: 15 MG/DL — SIGNIFICANT CHANGE UP (ref 7–23)
CALCIUM SERPL-MCNC: 8.7 MG/DL — SIGNIFICANT CHANGE UP (ref 8.4–10.5)
CHLORIDE SERPL-SCNC: 110 MMOL/L — HIGH (ref 96–108)
CO2 SERPL-SCNC: 22 MMOL/L — SIGNIFICANT CHANGE UP (ref 22–31)
CREAT SERPL-MCNC: 0.81 MG/DL — SIGNIFICANT CHANGE UP (ref 0.5–1.3)
EGFR: 74 ML/MIN/1.73M2 — SIGNIFICANT CHANGE UP
GLUCOSE SERPL-MCNC: 125 MG/DL — HIGH (ref 70–99)
GRAM STN FLD: SIGNIFICANT CHANGE UP
HCT VFR BLD CALC: 26.1 % — LOW (ref 34.5–45)
HGB BLD-MCNC: 8.5 G/DL — LOW (ref 11.5–15.5)
MCHC RBC-ENTMCNC: 30.9 PG — SIGNIFICANT CHANGE UP (ref 27–34)
MCHC RBC-ENTMCNC: 32.6 GM/DL — SIGNIFICANT CHANGE UP (ref 32–36)
MCV RBC AUTO: 94.9 FL — SIGNIFICANT CHANGE UP (ref 80–100)
MRSA PCR RESULT.: SIGNIFICANT CHANGE UP
NRBC # BLD: 0 /100 WBCS — SIGNIFICANT CHANGE UP (ref 0–0)
PLATELET # BLD AUTO: 353 K/UL — SIGNIFICANT CHANGE UP (ref 150–400)
POTASSIUM SERPL-MCNC: 3.9 MMOL/L — SIGNIFICANT CHANGE UP (ref 3.5–5.3)
POTASSIUM SERPL-SCNC: 3.9 MMOL/L — SIGNIFICANT CHANGE UP (ref 3.5–5.3)
PROT SERPL-MCNC: 6.2 G/DL — SIGNIFICANT CHANGE UP (ref 6–8.3)
RBC # BLD: 2.75 M/UL — LOW (ref 3.8–5.2)
RBC # FLD: 14.2 % — SIGNIFICANT CHANGE UP (ref 10.3–14.5)
S AUREUS DNA NOSE QL NAA+PROBE: SIGNIFICANT CHANGE UP
S PNEUM AG UR QL: NEGATIVE — SIGNIFICANT CHANGE UP
SODIUM SERPL-SCNC: 141 MMOL/L — SIGNIFICANT CHANGE UP (ref 135–145)
SPECIMEN SOURCE: SIGNIFICANT CHANGE UP
WBC # BLD: 21.22 K/UL — HIGH (ref 3.8–10.5)
WBC # FLD AUTO: 21.22 K/UL — HIGH (ref 3.8–10.5)

## 2023-01-10 PROCEDURE — 99233 SBSQ HOSP IP/OBS HIGH 50: CPT

## 2023-01-10 RX ORDER — SACCHAROMYCES BOULARDII 250 MG
250 POWDER IN PACKET (EA) ORAL
Refills: 0 | Status: DISCONTINUED | OUTPATIENT
Start: 2023-01-10 | End: 2023-01-12

## 2023-01-10 RX ADMIN — PREGABALIN 1000 MICROGRAM(S): 225 CAPSULE ORAL at 12:13

## 2023-01-10 RX ADMIN — Medication 81 MILLIGRAM(S): at 12:12

## 2023-01-10 RX ADMIN — ESCITALOPRAM OXALATE 10 MILLIGRAM(S): 10 TABLET, FILM COATED ORAL at 12:14

## 2023-01-10 RX ADMIN — CEFTRIAXONE 100 MILLIGRAM(S): 500 INJECTION, POWDER, FOR SOLUTION INTRAMUSCULAR; INTRAVENOUS at 20:22

## 2023-01-10 RX ADMIN — ENOXAPARIN SODIUM 40 MILLIGRAM(S): 100 INJECTION SUBCUTANEOUS at 20:24

## 2023-01-10 RX ADMIN — ATORVASTATIN CALCIUM 40 MILLIGRAM(S): 80 TABLET, FILM COATED ORAL at 21:30

## 2023-01-10 RX ADMIN — LOSARTAN POTASSIUM 100 MILLIGRAM(S): 100 TABLET, FILM COATED ORAL at 06:06

## 2023-01-10 RX ADMIN — Medication 250 MILLIGRAM(S): at 17:57

## 2023-01-10 NOTE — PROGRESS NOTE ADULT - SUBJECTIVE AND OBJECTIVE BOX
Patient is a 82y old  Female who presents with a chief complaint of CAP (2023 12:54)      Patient seen and examined at bedside.    ALLERGIES:  No Known Allergies    MEDICATIONS  (STANDING):  aspirin enteric coated 81 milliGRAM(s) Oral daily  atorvastatin 40 milliGRAM(s) Oral at bedtime  cefTRIAXone   IVPB 1000 milliGRAM(s) IV Intermittent every 24 hours  cyanocobalamin 1000 MICROGram(s) Oral daily  enoxaparin Injectable 40 milliGRAM(s) SubCutaneous every 24 hours  escitalopram 10 milliGRAM(s) Oral daily  hydrochlorothiazide 25 milliGRAM(s) Oral daily  losartan 100 milliGRAM(s) Oral daily  sodium chloride 0.9%. 1000 milliLiter(s) (60 mL/Hr) IV Continuous <Continuous>    MEDICATIONS  (PRN):  acetaminophen     Tablet .. 650 milliGRAM(s) Oral every 6 hours PRN Temp greater or equal to 38.5C (101.3F), Moderate Pain (4 - 6)  albuterol    90 MICROgram(s) HFA Inhaler 2 Puff(s) Inhalation every 6 hours PRN Shortness of Breath and/or Wheezing  benzonatate 100 milliGRAM(s) Oral every 8 hours PRN Cough  guaifenesin/dextromethorphan Oral Liquid 10 milliLiter(s) Oral every 4 hours PRN Cough    Vital Signs Last 24 Hrs  T(F): 98.7 (10 Michael 2023 06:03), Max: 100.3 (2023 21:48)  HR: 90 (10 Michael 2023 06:03) (84 - 96)  BP: 128/68 (10 Michael 2023 06:03) (106/61 - 128/68)  RR: 16 (10 Michael 2023 06:03) (14 - 16)  SpO2: 96% (10 Michael 2023 06:03) (94% - 97%)  I&O's Summary    BMI (kg/m2): 21.4 (- @ 12:57)  PHYSICAL EXAM:  General: NAD, A/O x 3  ENT: MMM, no scleral icterus  Neck: Supple, No JVD  Lungs: Comfortable, diminished at bases b/l  Cardio: RRR, S1/S2, No murmurs  Abdomen: Soft, Nontender, Nondistended; Bowel sounds present  Extremities: No calf tenderness, No pitting edema      LABS:                        8.5    21.22 )-----------( 353      ( 10 Michael 2023 07:00 )             26.1       01-10    141  |  110  |  15  ----------------------------<  125  3.9   |  22  |  0.81    Ca    8.7      10 Michael 2023 07:00    TPro  6.2  /  Alb  2.5  /  TBili  0.4  /  DBili  x   /  AST  19  /  ALT  11  /  AlkPhos  73  01-10       PT/INR - ( 2023 13:42 )   PT: 13.7 sec;   INR: 1.18 ratio         PTT - ( 2023 13:42 )  PTT:26.3 sec   Lactate, Blood: 0.8 mmol/L ( @ 15:48)  Lactate, Blood: 2.1 mmol/L ( @ 13:42)    CARDIAC MARKERS ( 2023 15:48 )  x     / 13.7 ng/L / x     / x     / x      CARDIAC MARKERS ( 2023 13:42 )  x     / 12.5 ng/L / x     / x     / x                            Urinalysis Basic - ( 2023 16:34 )    Color: Yellow / Appearance: Clear / S.010 / pH: x  Gluc: x / Ketone: Negative  / Bili: Negative / Urobili: Negative   Blood: x / Protein: 30 mg/dL / Nitrite: Negative   Leuk Esterase: Negative / RBC: Negative /HPF / WBC Negative /HPF   Sq Epi: x / Non Sq Epi: Neg.-Few / Bacteria: x        Culture - Sputum (collected 2023 17:00)  Source: .Sputum Sputum  Gram Stain (10 Michael 2023 05:05):    Few polymorphonuclear leukocytes per low power field    Few Squamous epithelial cells per low power field    Moderate Gram Negative Rods seen per oil power field    Moderate Gram positive cocci in pairs seen per oil power field    Culture - Urine (collected 2023 16:34)  Source: Clean Catch Clean Catch (Midstream)  Final Report (2023 16:29):    <10,000 CFU/mL Normal Urogenital Alma Delia    Culture - Blood (collected 2023 13:42)  Source: .Blood Blood-Peripheral  Preliminary Report (2023 22:02):    No growth to date.    Culture - Blood (collected 2023 13:42)  Source: .Blood Blood-Peripheral  Preliminary Report (2023 22:02):    No growth to date.          RADIOLOGY & ADDITIONAL TESTS:    Care Discussed with Consultants/Other Providers:    Patient is a 82y old  Female who presents with a chief complaint of CAP (2023 12:54)      Patient seen and examined at bedside. c/o diarrhea overnight. otherwise denies headache, fever, chills, cp, sob, n/v, abd pain.     ALLERGIES:  No Known Allergies    MEDICATIONS  (STANDING):  aspirin enteric coated 81 milliGRAM(s) Oral daily  atorvastatin 40 milliGRAM(s) Oral at bedtime  cefTRIAXone   IVPB 1000 milliGRAM(s) IV Intermittent every 24 hours  cyanocobalamin 1000 MICROGram(s) Oral daily  enoxaparin Injectable 40 milliGRAM(s) SubCutaneous every 24 hours  escitalopram 10 milliGRAM(s) Oral daily  hydrochlorothiazide 25 milliGRAM(s) Oral daily  losartan 100 milliGRAM(s) Oral daily  sodium chloride 0.9%. 1000 milliLiter(s) (60 mL/Hr) IV Continuous <Continuous>    MEDICATIONS  (PRN):  acetaminophen     Tablet .. 650 milliGRAM(s) Oral every 6 hours PRN Temp greater or equal to 38.5C (101.3F), Moderate Pain (4 - 6)  albuterol    90 MICROgram(s) HFA Inhaler 2 Puff(s) Inhalation every 6 hours PRN Shortness of Breath and/or Wheezing  benzonatate 100 milliGRAM(s) Oral every 8 hours PRN Cough  guaifenesin/dextromethorphan Oral Liquid 10 milliLiter(s) Oral every 4 hours PRN Cough    Vital Signs Last 24 Hrs  T(F): 98.7 (10 Michael 2023 06:03), Max: 100.3 (2023 21:48)  HR: 90 (10 Michael 2023 06:03) (84 - 96)  BP: 128/68 (10 Michael 2023 06:03) (106/61 - 128/68)  RR: 16 (10 Michael 2023 06:03) (14 - 16)  SpO2: 96% (10 Michael 2023 06:03) (94% - 97%)  I&O's Summary    BMI (kg/m2): 21.4 (23 @ 12:57)  PHYSICAL EXAM:  General: NAD, A/O x 3  ENT: MMM, no scleral icterus  Neck: Supple, No JVD  Lungs: Comfortable, diminished at bases b/l  Cardio: RRR, S1/S2  Abdomen: Soft, Nontender, Nondistended; Bowel sounds present  Extremities: No calf tenderness, No pitting edema      LABS:                        8.5    21.22 )-----------( 353      ( 10 Michael 2023 07:00 )             26.1       01-10    141  |  110  |  15  ----------------------------<  125  3.9   |  22  |  0.81    Ca    8.7      10 Michael 2023 07:00    TPro  6.2  /  Alb  2.5  /  TBili  0.4  /  DBili  x   /  AST  19  /  ALT  11  /  AlkPhos  73  01-10       PT/INR - ( 2023 13:42 )   PT: 13.7 sec;   INR: 1.18 ratio         PTT - ( 2023 13:42 )  PTT:26.3 sec   Lactate, Blood: 0.8 mmol/L ( @ 15:48)  Lactate, Blood: 2.1 mmol/L ( @ 13:42)    CARDIAC MARKERS ( 2023 15:48 )  x     / 13.7 ng/L / x     / x     / x      CARDIAC MARKERS ( 2023 13:42 )  x     / 12.5 ng/L / x     / x     / x                            Urinalysis Basic - ( 2023 16:34 )    Color: Yellow / Appearance: Clear / S.010 / pH: x  Gluc: x / Ketone: Negative  / Bili: Negative / Urobili: Negative   Blood: x / Protein: 30 mg/dL / Nitrite: Negative   Leuk Esterase: Negative / RBC: Negative /HPF / WBC Negative /HPF   Sq Epi: x / Non Sq Epi: Neg.-Few / Bacteria: x        Culture - Sputum (collected 2023 17:00)  Source: .Sputum Sputum  Gram Stain (10 Mihcael 2023 05:05):    Few polymorphonuclear leukocytes per low power field    Few Squamous epithelial cells per low power field    Moderate Gram Negative Rods seen per oil power field    Moderate Gram positive cocci in pairs seen per oil power field    Culture - Urine (collected 2023 16:34)  Source: Clean Catch Clean Catch (Midstream)  Final Report (2023 16:29):    <10,000 CFU/mL Normal Urogenital Alma Delia    Culture - Blood (collected 2023 13:42)  Source: .Blood Blood-Peripheral  Preliminary Report (2023 22:02):    No growth to date.    Culture - Blood (collected 2023 13:42)  Source: .Blood Blood-Peripheral  Preliminary Report (2023 22:02):    No growth to date.    RADIOLOGY & ADDITIONAL TESTS: reviewed    Care Discussed with Consultants/Other Providers:

## 2023-01-10 NOTE — PATIENT PROFILE ADULT - FALL HARM RISK - HARM RISK INTERVENTIONS

## 2023-01-10 NOTE — PROGRESS NOTE ADULT - ASSESSMENT
83 y/o F with PMH COPD not on home O2, HTN, HLD, Lung Cancer s/p lung resection in 2018, former smoker, OA, anxiety and depression admitted for sepsis due to CAP.    #Sepsis due to CAP - improving  #COPD not on home O2  #Lung Cancer s/p resection  #Hyponatremia - improved  -Tmax 100.3F. O2 96% on RA  -Continue CFTX (1/8- )  -Continue IVF  -Blood cx NGTD, urine cx unrevealing. legionella neg  -Strept/MSSA PCR pending  -Sputum culture pending collection  -PT eval    #Anemia  -Hb 11.4 > 8.8, possibly dilutional. No active s/s bleeding. Follow repeat CBC 1400    #HTN  -Conitnue HCTZ 25mg qd, losartan 100mg qd    #HLD  -Continue lipitor    #Anxiety  #Depression  -Continue lexapro     DVT ppx - lovenox    Daughter Josy  called for routine update, no answer 1/9   81 y/o F with PMH COPD not on home O2, HTN, HLD, Lung Cancer s/p lung resection in 2018, former smoker, OA, anxiety and depression admitted for sepsis due to CAP.    #Sepsis due to CAP - improving  #COPD not on home O2  #Lung Cancer s/p resection  #Hyponatremia - improved  -Tmax 100.3F. O2 96% on RA  -Continue CFTX (1/8- )  -Continue IVF  -Blood cx NGTD, urine cx unrevealing. legionella neg  -Strept/MSSA PCR pending  -Sputum culture pending collection  -PT eval - no skilled PT needs  -Start florastor for diarrhea     #Anemia - stable  #B12 deficiency  -H/H stable, no active s/s bleeding  -Due for B12 1000mcg IM q6-8 weeks    #HTN  -Continue HCTZ 25mg qd, losartan 100mg qd    #HLD  -Continue lipitor    #Anxiety  #Depression  -Continue lexapro     DVT ppx - lovenox    Daughter Josy  called for routine update, no answer 1/10

## 2023-01-11 LAB
ANION GAP SERPL CALC-SCNC: 9 MMOL/L — SIGNIFICANT CHANGE UP (ref 5–17)
BUN SERPL-MCNC: 11 MG/DL — SIGNIFICANT CHANGE UP (ref 7–23)
CALCIUM SERPL-MCNC: 9.6 MG/DL — SIGNIFICANT CHANGE UP (ref 8.4–10.5)
CHLORIDE SERPL-SCNC: 103 MMOL/L — SIGNIFICANT CHANGE UP (ref 96–108)
CO2 SERPL-SCNC: 26 MMOL/L — SIGNIFICANT CHANGE UP (ref 22–31)
CREAT SERPL-MCNC: 0.8 MG/DL — SIGNIFICANT CHANGE UP (ref 0.5–1.3)
CULTURE RESULTS: SIGNIFICANT CHANGE UP
EGFR: 75 ML/MIN/1.73M2 — SIGNIFICANT CHANGE UP
GLUCOSE SERPL-MCNC: 108 MG/DL — HIGH (ref 70–99)
HCT VFR BLD CALC: 30 % — LOW (ref 34.5–45)
HGB BLD-MCNC: 9.6 G/DL — LOW (ref 11.5–15.5)
MCHC RBC-ENTMCNC: 30.7 PG — SIGNIFICANT CHANGE UP (ref 27–34)
MCHC RBC-ENTMCNC: 32 GM/DL — SIGNIFICANT CHANGE UP (ref 32–36)
MCV RBC AUTO: 95.8 FL — SIGNIFICANT CHANGE UP (ref 80–100)
NRBC # BLD: 0 /100 WBCS — SIGNIFICANT CHANGE UP (ref 0–0)
PLATELET # BLD AUTO: 442 K/UL — HIGH (ref 150–400)
POTASSIUM SERPL-MCNC: 4.1 MMOL/L — SIGNIFICANT CHANGE UP (ref 3.5–5.3)
POTASSIUM SERPL-SCNC: 4.1 MMOL/L — SIGNIFICANT CHANGE UP (ref 3.5–5.3)
RBC # BLD: 3.13 M/UL — LOW (ref 3.8–5.2)
RBC # FLD: 13.9 % — SIGNIFICANT CHANGE UP (ref 10.3–14.5)
SODIUM SERPL-SCNC: 138 MMOL/L — SIGNIFICANT CHANGE UP (ref 135–145)
SPECIMEN SOURCE: SIGNIFICANT CHANGE UP
WBC # BLD: 20.83 K/UL — HIGH (ref 3.8–10.5)
WBC # FLD AUTO: 20.83 K/UL — HIGH (ref 3.8–10.5)

## 2023-01-11 PROCEDURE — 71045 X-RAY EXAM CHEST 1 VIEW: CPT | Mod: 26

## 2023-01-11 PROCEDURE — 99233 SBSQ HOSP IP/OBS HIGH 50: CPT

## 2023-01-11 RX ADMIN — ATORVASTATIN CALCIUM 40 MILLIGRAM(S): 80 TABLET, FILM COATED ORAL at 22:09

## 2023-01-11 RX ADMIN — ESCITALOPRAM OXALATE 10 MILLIGRAM(S): 10 TABLET, FILM COATED ORAL at 12:21

## 2023-01-11 RX ADMIN — LOSARTAN POTASSIUM 100 MILLIGRAM(S): 100 TABLET, FILM COATED ORAL at 05:36

## 2023-01-11 RX ADMIN — PREGABALIN 1000 MICROGRAM(S): 225 CAPSULE ORAL at 12:21

## 2023-01-11 RX ADMIN — CEFTRIAXONE 100 MILLIGRAM(S): 500 INJECTION, POWDER, FOR SOLUTION INTRAMUSCULAR; INTRAVENOUS at 22:09

## 2023-01-11 RX ADMIN — Medication 81 MILLIGRAM(S): at 12:21

## 2023-01-11 RX ADMIN — Medication 250 MILLIGRAM(S): at 05:35

## 2023-01-11 RX ADMIN — Medication 250 MILLIGRAM(S): at 18:14

## 2023-01-11 NOTE — PROGRESS NOTE ADULT - ASSESSMENT
81 y/o F with PMH COPD not on home O2, HTN, HLD, Lung Cancer s/p lung resection in 2018, former smoker, OA, anxiety and depression admitted for sepsis due to CAP.    #Sepsis due to CAP - improving  #COPD not on home O2  #Lung Cancer s/p resection  #Hyponatremia - improved  -Afebrile. O2 96% on RA  -Continue CFTX (1/8- )  -Continue IVF  -Blood cx NGTD, urine cx unrevealing. legionella neg  -Strept/MSSA PCR neg  -Sputum culture reviewed  -PT eval - no skilled PT needs  -Florastor for diarrhea   -F/u repeat CXR 1/11    #Anemia - stable  #B12 deficiency  -H/H stable, no active s/s bleeding  -Due for B12 1000mcg IM q6-8 weeks    #HTN  -Continue HCTZ 25mg qd, losartan 100mg qd    #HLD  -Continue lipitor    #Anxiety  #Depression  -Continue lexapro     DVT ppx - lovenox    Daughter Josy  called for routine update, no answer 1/10     81 y/o F with PMH COPD not on home O2, HTN, HLD, Lung Cancer s/p lung resection in 2018, former smoker, OA, anxiety and depression admitted for sepsis due to CAP.    #Sepsis due to CAP - improving  #COPD not on home O2  #Lung Cancer s/p resection  #Hyponatremia - improved  -Afebrile. O2 96% on RA  -Continue CFTX (1/8- )  -DC IVF  -Blood cx NGTD, urine cx unrevealing. legionella neg  -Strept/MSSA PCR neg  -Sputum culture reviewed  -PT eval - no skilled PT needs  -Florastor for diarrhea   -F/u repeat CXR 1/11    #Anemia - stable  #B12 deficiency  -H/H stable, no active s/s bleeding  -Due for B12 1000mcg IM q6-8 weeks    #HTN  -Continue HCTZ 25mg qd, losartan 100mg qd    #HLD  -Continue lipitor    #Anxiety  #Depression  -Continue lexapro     DVT ppx - lovenox    Daughter Josy  updated 1/11  Dispo - anticipate discharge home 1/12

## 2023-01-11 NOTE — PROGRESS NOTE ADULT - SUBJECTIVE AND OBJECTIVE BOX
Patient is a 82y old  Female who presents with a chief complaint of CAP (10 Michael 2023 08:40)      Patient seen and examined at bedside.     ALLERGIES:  No Known Allergies    MEDICATIONS  (STANDING):  aspirin enteric coated 81 milliGRAM(s) Oral daily  atorvastatin 40 milliGRAM(s) Oral at bedtime  cefTRIAXone   IVPB 1000 milliGRAM(s) IV Intermittent every 24 hours  cyanocobalamin 1000 MICROGram(s) Oral daily  enoxaparin Injectable 40 milliGRAM(s) SubCutaneous every 24 hours  escitalopram 10 milliGRAM(s) Oral daily  hydrochlorothiazide 25 milliGRAM(s) Oral daily  losartan 100 milliGRAM(s) Oral daily  saccharomyces boulardii 250 milliGRAM(s) Oral two times a day  sodium chloride 0.9%. 1000 milliLiter(s) (60 mL/Hr) IV Continuous <Continuous>    MEDICATIONS  (PRN):  acetaminophen     Tablet .. 650 milliGRAM(s) Oral every 6 hours PRN Temp greater or equal to 38.5C (101.3F), Moderate Pain (4 - 6)  albuterol    90 MICROgram(s) HFA Inhaler 2 Puff(s) Inhalation every 6 hours PRN Shortness of Breath and/or Wheezing  benzonatate 100 milliGRAM(s) Oral every 8 hours PRN Cough  guaifenesin/dextromethorphan Oral Liquid 10 milliLiter(s) Oral every 4 hours PRN Cough    Vital Signs Last 24 Hrs  T(F): 98.4 (2023 05:34), Max: 98.4 (2023 05:34)  HR: 95 (2023 05:34) (78 - 95)  BP: 125/56 (2023 05:34) (122/65 - 125/56)  RR: 18 (2023 05:34) (15 - 18)  SpO2: 94% (2023 05:34) (94% - 97%)  I&O's Summary    BMI (kg/m2): 21.4 (23 @ 12:57)  PHYSICAL EXAM:  General: NAD, A/O x 3  ENT: MMM, no scleral icterus  Neck: Supple, No JVD  Lungs: Comfortable, diminished at bases b/l  Cardio: RRR, S1/S2  Abdomen: Soft, Nontender, Nondistended; Bowel sounds present  Extremities: No calf tenderness, No pitting edema    LABS:                        9.6    20.83 )-----------( 442      ( 2023 06:40 )             30.0       -    138  |  103  |  11  ----------------------------<  108  4.1   |  26  |  0.80    Ca    9.6      2023 06:40    TPro  6.2  /  Alb  2.5  /  TBili  0.4  /  DBili  x   /  AST  19  /  ALT  11  /  AlkPhos  73  01-10       PT/INR - ( 2023 13:42 )   PT: 13.7 sec;   INR: 1.18 ratio         PTT - ( 2023 13:42 )  PTT:26.3 sec   Lactate, Blood: 0.8 mmol/L ( @ 15:48)  Lactate, Blood: 2.1 mmol/L ( @ 13:42)    CARDIAC MARKERS ( 2023 15:48 )  x     / 13.7 ng/L / x     / x     / x      CARDIAC MARKERS ( 2023 13:42 )  x     / 12.5 ng/L / x     / x     / x                            Urinalysis Basic - ( 2023 16:34 )    Color: Yellow / Appearance: Clear / S.010 / pH: x  Gluc: x / Ketone: Negative  / Bili: Negative / Urobili: Negative   Blood: x / Protein: 30 mg/dL / Nitrite: Negative   Leuk Esterase: Negative / RBC: Negative /HPF / WBC Negative /HPF   Sq Epi: x / Non Sq Epi: Neg.-Few / Bacteria: x        Culture - Sputum (collected 2023 17:00)  Source: .Sputum Sputum  Gram Stain (10 Michael 2023 05:05):    Few polymorphonuclear leukocytes per low power field    Few Squamous epithelial cells per low power field    Moderate Gram Negative Rods seen per oil power field    Moderate Gram positive cocci in pairs seen per oil power field  Preliminary Report (10 Michael 2023 22:19):    Normal Respiratory Alma Delia present    Culture - Urine (collected 2023 16:34)  Source: Clean Catch Clean Catch (Midstream)  Final Report (2023 16:29):    <10,000 CFU/mL Normal Urogenital Alma Delia    Culture - Blood (collected 2023 13:42)  Source: .Blood Blood-Peripheral  Preliminary Report (2023 22:02):    No growth to date.    Culture - Blood (collected 2023 13:42)  Source: .Blood Blood-Peripheral  Preliminary Report (2023 22:02):    No growth to date.          RADIOLOGY & ADDITIONAL TESTS: reviewed    Care Discussed with Consultants/Other Providers:    Patient is a 82y old  Female who presents with a chief complaint of CAP (10 Michael 2023 08:40)      Patient seen and examined at bedside. diarrhea somewhat improved. denies headache, fever, chills, cp, sob, n/v, abd pain.      ALLERGIES:  No Known Allergies    MEDICATIONS  (STANDING):  aspirin enteric coated 81 milliGRAM(s) Oral daily  atorvastatin 40 milliGRAM(s) Oral at bedtime  cefTRIAXone   IVPB 1000 milliGRAM(s) IV Intermittent every 24 hours  cyanocobalamin 1000 MICROGram(s) Oral daily  enoxaparin Injectable 40 milliGRAM(s) SubCutaneous every 24 hours  escitalopram 10 milliGRAM(s) Oral daily  hydrochlorothiazide 25 milliGRAM(s) Oral daily  losartan 100 milliGRAM(s) Oral daily  saccharomyces boulardii 250 milliGRAM(s) Oral two times a day  sodium chloride 0.9%. 1000 milliLiter(s) (60 mL/Hr) IV Continuous <Continuous>    MEDICATIONS  (PRN):  acetaminophen     Tablet .. 650 milliGRAM(s) Oral every 6 hours PRN Temp greater or equal to 38.5C (101.3F), Moderate Pain (4 - 6)  albuterol    90 MICROgram(s) HFA Inhaler 2 Puff(s) Inhalation every 6 hours PRN Shortness of Breath and/or Wheezing  benzonatate 100 milliGRAM(s) Oral every 8 hours PRN Cough  guaifenesin/dextromethorphan Oral Liquid 10 milliLiter(s) Oral every 4 hours PRN Cough    Vital Signs Last 24 Hrs  T(F): 98.4 (2023 05:34), Max: 98.4 (2023 05:34)  HR: 95 (2023 05:34) (78 - 95)  BP: 125/56 (2023 05:34) (122/65 - 125/56)  RR: 18 (2023 05:34) (15 - 18)  SpO2: 94% (2023 05:34) (94% - 97%)  I&O's Summary    BMI (kg/m2): 21.4 (23 @ 12:57)  PHYSICAL EXAM:  General: NAD, A/O x 3  ENT: MMM, no scleral icterus  Neck: Supple, No JVD  Lungs: Comfortable, diminished at bases b/l  Cardio: RRR, S1/S2  Abdomen: Soft, Nontender, Nondistended; Bowel sounds present  Extremities: No calf tenderness, No pitting edema    LABS:                        9.6    20.83 )-----------( 442      ( 2023 06:40 )             30.0       01-11    138  |  103  |  11  ----------------------------<  108  4.1   |  26  |  0.80    Ca    9.6      2023 06:40    TPro  6.2  /  Alb  2.5  /  TBili  0.4  /  DBili  x   /  AST  19  /  ALT  11  /  AlkPhos  73  01-10       PT/INR - ( 2023 13:42 )   PT: 13.7 sec;   INR: 1.18 ratio         PTT - ( 2023 13:42 )  PTT:26.3 sec   Lactate, Blood: 0.8 mmol/L ( @ 15:48)  Lactate, Blood: 2.1 mmol/L ( @ 13:42)    CARDIAC MARKERS ( 2023 15:48 )  x     / 13.7 ng/L / x     / x     / x      CARDIAC MARKERS ( 2023 13:42 )  x     / 12.5 ng/L / x     / x     / x                            Urinalysis Basic - ( 2023 16:34 )    Color: Yellow / Appearance: Clear / S.010 / pH: x  Gluc: x / Ketone: Negative  / Bili: Negative / Urobili: Negative   Blood: x / Protein: 30 mg/dL / Nitrite: Negative   Leuk Esterase: Negative / RBC: Negative /HPF / WBC Negative /HPF   Sq Epi: x / Non Sq Epi: Neg.-Few / Bacteria: x        Culture - Sputum (collected 2023 17:00)  Source: .Sputum Sputum  Gram Stain (10 Michael 2023 05:05):    Few polymorphonuclear leukocytes per low power field    Few Squamous epithelial cells per low power field    Moderate Gram Negative Rods seen per oil power field    Moderate Gram positive cocci in pairs seen per oil power field  Preliminary Report (10 Michael 2023 22:19):    Normal Respiratory Alma Delia present    Culture - Urine (collected 2023 16:34)  Source: Clean Catch Clean Catch (Midstream)  Final Report (2023 16:29):    <10,000 CFU/mL Normal Urogenital Alma Delia    Culture - Blood (collected 2023 13:42)  Source: .Blood Blood-Peripheral  Preliminary Report (2023 22:02):    No growth to date.    Culture - Blood (collected 2023 13:42)  Source: .Blood Blood-Peripheral  Preliminary Report (2023 22:02):    No growth to date.          RADIOLOGY & ADDITIONAL TESTS: reviewed      Care Discussed with Consultants/Other Providers:

## 2023-01-11 NOTE — CDI QUERY NOTE - NSCDIOTHERTXTBX_GEN_ALL_CORE_HH
Presents with Sepsis due to CAP    ER VS - TM - 101.5 - HR - 107 - BP - 81/47. WBC - 39.89. Lactate - 2.1    Please clarify    - Severe Sepsis  - Other(specify)      Thank you.

## 2023-01-12 ENCOUNTER — TRANSCRIPTION ENCOUNTER (OUTPATIENT)
Age: 83
End: 2023-01-12

## 2023-01-12 VITALS
SYSTOLIC BLOOD PRESSURE: 143 MMHG | HEART RATE: 83 BPM | OXYGEN SATURATION: 94 % | DIASTOLIC BLOOD PRESSURE: 69 MMHG | RESPIRATION RATE: 16 BRPM | TEMPERATURE: 98 F

## 2023-01-12 LAB
ANION GAP SERPL CALC-SCNC: 9 MMOL/L — SIGNIFICANT CHANGE UP (ref 5–17)
BUN SERPL-MCNC: 13 MG/DL — SIGNIFICANT CHANGE UP (ref 7–23)
CALCIUM SERPL-MCNC: 9.6 MG/DL — SIGNIFICANT CHANGE UP (ref 8.4–10.5)
CHLORIDE SERPL-SCNC: 103 MMOL/L — SIGNIFICANT CHANGE UP (ref 96–108)
CO2 SERPL-SCNC: 26 MMOL/L — SIGNIFICANT CHANGE UP (ref 22–31)
CREAT SERPL-MCNC: 0.83 MG/DL — SIGNIFICANT CHANGE UP (ref 0.5–1.3)
EGFR: 71 ML/MIN/1.73M2 — SIGNIFICANT CHANGE UP
GLUCOSE SERPL-MCNC: 115 MG/DL — HIGH (ref 70–99)
HCT VFR BLD CALC: 28.3 % — LOW (ref 34.5–45)
HGB BLD-MCNC: 9.1 G/DL — LOW (ref 11.5–15.5)
MCHC RBC-ENTMCNC: 30.4 PG — SIGNIFICANT CHANGE UP (ref 27–34)
MCHC RBC-ENTMCNC: 32.2 GM/DL — SIGNIFICANT CHANGE UP (ref 32–36)
MCV RBC AUTO: 94.6 FL — SIGNIFICANT CHANGE UP (ref 80–100)
NRBC # BLD: 0 /100 WBCS — SIGNIFICANT CHANGE UP (ref 0–0)
PLATELET # BLD AUTO: 436 K/UL — HIGH (ref 150–400)
POTASSIUM SERPL-MCNC: 3.8 MMOL/L — SIGNIFICANT CHANGE UP (ref 3.5–5.3)
POTASSIUM SERPL-SCNC: 3.8 MMOL/L — SIGNIFICANT CHANGE UP (ref 3.5–5.3)
RBC # BLD: 2.99 M/UL — LOW (ref 3.8–5.2)
RBC # FLD: 13.8 % — SIGNIFICANT CHANGE UP (ref 10.3–14.5)
SODIUM SERPL-SCNC: 138 MMOL/L — SIGNIFICANT CHANGE UP (ref 135–145)
WBC # BLD: 16.39 K/UL — HIGH (ref 3.8–10.5)
WBC # FLD AUTO: 16.39 K/UL — HIGH (ref 3.8–10.5)

## 2023-01-12 PROCEDURE — 85025 COMPLETE CBC W/AUTO DIFF WBC: CPT

## 2023-01-12 PROCEDURE — 97162 PT EVAL MOD COMPLEX 30 MIN: CPT

## 2023-01-12 PROCEDURE — 36415 COLL VENOUS BLD VENIPUNCTURE: CPT

## 2023-01-12 PROCEDURE — 71045 X-RAY EXAM CHEST 1 VIEW: CPT

## 2023-01-12 PROCEDURE — 85610 PROTHROMBIN TIME: CPT

## 2023-01-12 PROCEDURE — 83880 ASSAY OF NATRIURETIC PEPTIDE: CPT

## 2023-01-12 PROCEDURE — 81001 URINALYSIS AUTO W/SCOPE: CPT

## 2023-01-12 PROCEDURE — 84484 ASSAY OF TROPONIN QUANT: CPT

## 2023-01-12 PROCEDURE — 93005 ELECTROCARDIOGRAM TRACING: CPT

## 2023-01-12 PROCEDURE — 71275 CT ANGIOGRAPHY CHEST: CPT | Mod: MA

## 2023-01-12 PROCEDURE — 85730 THROMBOPLASTIN TIME PARTIAL: CPT

## 2023-01-12 PROCEDURE — 87640 STAPH A DNA AMP PROBE: CPT

## 2023-01-12 PROCEDURE — 83605 ASSAY OF LACTIC ACID: CPT

## 2023-01-12 PROCEDURE — 96360 HYDRATION IV INFUSION INIT: CPT

## 2023-01-12 PROCEDURE — 87641 MR-STAPH DNA AMP PROBE: CPT

## 2023-01-12 PROCEDURE — 87899 AGENT NOS ASSAY W/OPTIC: CPT

## 2023-01-12 PROCEDURE — 80048 BASIC METABOLIC PNL TOTAL CA: CPT

## 2023-01-12 PROCEDURE — 87070 CULTURE OTHR SPECIMN AEROBIC: CPT

## 2023-01-12 PROCEDURE — 87449 NOS EACH ORGANISM AG IA: CPT

## 2023-01-12 PROCEDURE — 99285 EMERGENCY DEPT VISIT HI MDM: CPT

## 2023-01-12 PROCEDURE — 0225U NFCT DS DNA&RNA 21 SARSCOV2: CPT

## 2023-01-12 PROCEDURE — 82607 VITAMIN B-12: CPT

## 2023-01-12 PROCEDURE — 87040 BLOOD CULTURE FOR BACTERIA: CPT

## 2023-01-12 PROCEDURE — 84145 PROCALCITONIN (PCT): CPT

## 2023-01-12 PROCEDURE — 85027 COMPLETE CBC AUTOMATED: CPT

## 2023-01-12 PROCEDURE — 99239 HOSP IP/OBS DSCHRG MGMT >30: CPT

## 2023-01-12 PROCEDURE — 87086 URINE CULTURE/COLONY COUNT: CPT

## 2023-01-12 PROCEDURE — 80053 COMPREHEN METABOLIC PANEL: CPT

## 2023-01-12 RX ORDER — GUAIFENESIN/DEXTROMETHORPHAN 600MG-30MG
10 TABLET, EXTENDED RELEASE 12 HR ORAL
Qty: 280 | Refills: 0
Start: 2023-01-12 | End: 2023-01-18

## 2023-01-12 RX ORDER — ALBUTEROL 90 UG/1
2 AEROSOL, METERED ORAL
Qty: 112 | Refills: 0
Start: 2023-01-12 | End: 2023-01-25

## 2023-01-12 RX ORDER — SACCHAROMYCES BOULARDII 250 MG
1 POWDER IN PACKET (EA) ORAL
Qty: 6 | Refills: 0
Start: 2023-01-12 | End: 2023-01-14

## 2023-01-12 RX ORDER — CEFUROXIME AXETIL 250 MG
1 TABLET ORAL
Qty: 6 | Refills: 0
Start: 2023-01-12 | End: 2023-01-14

## 2023-01-12 RX ORDER — CEFUROXIME AXETIL 250 MG
500 TABLET ORAL EVERY 12 HOURS
Refills: 0 | Status: DISCONTINUED | OUTPATIENT
Start: 2023-01-12 | End: 2023-01-12

## 2023-01-12 RX ADMIN — PREGABALIN 1000 MICROGRAM(S): 225 CAPSULE ORAL at 11:15

## 2023-01-12 RX ADMIN — LOSARTAN POTASSIUM 100 MILLIGRAM(S): 100 TABLET, FILM COATED ORAL at 05:45

## 2023-01-12 RX ADMIN — Medication 250 MILLIGRAM(S): at 05:45

## 2023-01-12 RX ADMIN — Medication 81 MILLIGRAM(S): at 11:15

## 2023-01-12 RX ADMIN — ESCITALOPRAM OXALATE 10 MILLIGRAM(S): 10 TABLET, FILM COATED ORAL at 11:14

## 2023-01-12 NOTE — DISCHARGE NOTE NURSING/CASE MANAGEMENT/SOCIAL WORK - NSDCPEFALRISK_GEN_ALL_CORE
For information on Fall & Injury Prevention, visit: [email or web address] OR  [email or web address] OR  https://www.cdc.gov/steadi/patient.html

## 2023-01-12 NOTE — DISCHARGE NOTE NURSING/CASE MANAGEMENT/SOCIAL WORK - PATIENT PORTAL LINK FT
You can access the FollowMyHealth Patient Portal offered by [facility] by registering at the following website: [email or web address]. By joining [facility]’s FollowMyHealth portal, you will also be able to view your health information using other applications (apps) compatible with our system.

## 2023-01-12 NOTE — PROGRESS NOTE ADULT - ASSESSMENT
83 y/o F with PMH COPD not on home O2, HTN, HLD, Lung Cancer s/p lung resection in 2018, former smoker, OA, anxiety and depression admitted for sepsis due to CAP.    #Sepsis due to CAP - improving  #COPD not on home O2  #Lung Cancer s/p resection  #Hyponatremia - improved  -Afebrile. O2 96% on RA  -switch to ceftin 500mg BID for 3 more days. total 7 days  -Blood cx NGTD, urine cx unrevealing. legionella neg  -Strept/MSSA PCR neg  -Sputum culture normal respiratory onesimo  -PT eval - no skilled PT needs  -Florastor   -repeat CXR reviewed    #Anemia - stable  #B12 deficiency  -H/H stable, no active s/s bleeding  -Due for B12 1000mcg IM q6-8 weeks    #HTN  -Continue HCTZ 25mg qd, losartan 100mg qd    #HLD  -Continue lipitor    #Anxiety  #Depression  -Continue lexapro     DVT ppx - lovenox    Daughter Josy  updated 1/11  Dispo - DC home today 81 y/o F with PMH COPD not on home O2, HTN, HLD, Lung Cancer s/p lung resection in 2018, former smoker, OA, anxiety and depression admitted for sepsis due to CAP.    #Severe sepsis due to CAP - sepsis resolved  #COPD not on home O2  #Lung Cancer s/p resection  #Hyponatremia - improved  -Afebrile. O2 96% on RA  -switch to ceftin 500mg BID for 3 more days. total 7 days  -Blood cx NGTD, urine cx unrevealing. legionella neg  -Strept/MSSA PCR neg  -Sputum culture normal respiratory onesimo  -PT eval - no skilled PT needs  -Florastor   -repeat CXR reviewed    #Anemia - stable  #B12 deficiency  -H/H stable, no active s/s bleeding  -Due for B12 1000mcg IM q6-8 weeks    #HTN  -Continue HCTZ 25mg qd, losartan 100mg qd    #HLD  -Continue lipitor    #Anxiety  #Depression  -Continue lexapro     DVT ppx - lovenox    Daughter Josy  updated 1/11  Dispo - DC home today

## 2023-01-12 NOTE — DISCHARGE NOTE PROVIDER - NSDCMRMEDTOKEN_GEN_ALL_CORE_FT
albuterol 90 mcg/inh inhalation aerosol: 2 puff(s) inhaled every 6 hours, As needed, Shortness of Breath and/or Wheezing  aspirin 81 mg oral delayed release tablet: 1 tab(s) orally once a day  atorvastatin 40 mg oral tablet: 1 tab(s) orally once a day (at bedtime)  cefuroxime 500 mg oral tablet: 1 tab(s) orally every 12 hours  guaifenesin-dextromethorphan 100 mg-10 mg/5 mL oral liquid: 10 milliliter(s) orally every 6 hours, As Needed -Cough   hydroCHLOROthiazide 25 mg oral tablet: 1 tab(s) orally once a day  Lexapro 10 mg oral tablet: 1 tab(s) orally once a day (at bedtime)  losartan 100 mg oral tablet: 1 tab(s) orally once a day  saccharomyces boulardii lyo 250 mg oral capsule: 1 cap(s) orally 2 times a day  Vitamin B12 1000 mcg oral tablet: 1 tab(s) orally once a day

## 2023-01-12 NOTE — PROGRESS NOTE ADULT - SUBJECTIVE AND OBJECTIVE BOX
Patient is a 82y old  Female who presents with a chief complaint of CAP (11 Jan 2023 08:41)      Subjective and overnight events:  Patient seen and examined at bedside.  mild headache this morning, reports cough improving. no sob, cp, fever, chills.    ALLERGIES:  No Known Allergies    MEDICATIONS  (STANDING):  aspirin enteric coated 81 milliGRAM(s) Oral daily  atorvastatin 40 milliGRAM(s) Oral at bedtime  cefuroxime   Tablet 500 milliGRAM(s) Oral every 12 hours  cyanocobalamin 1000 MICROGram(s) Oral daily  enoxaparin Injectable 40 milliGRAM(s) SubCutaneous every 24 hours  escitalopram 10 milliGRAM(s) Oral daily  hydrochlorothiazide 25 milliGRAM(s) Oral daily  losartan 100 milliGRAM(s) Oral daily  saccharomyces boulardii 250 milliGRAM(s) Oral two times a day    MEDICATIONS  (PRN):  acetaminophen     Tablet .. 650 milliGRAM(s) Oral every 6 hours PRN Temp greater or equal to 38.5C (101.3F), Moderate Pain (4 - 6)  albuterol    90 MICROgram(s) HFA Inhaler 2 Puff(s) Inhalation every 6 hours PRN Shortness of Breath and/or Wheezing  benzonatate 100 milliGRAM(s) Oral every 8 hours PRN Cough  guaifenesin/dextromethorphan Oral Liquid 10 milliLiter(s) Oral every 4 hours PRN Cough    Vital Signs Last 24 Hrs  T(F): 98.5 (12 Jan 2023 04:30), Max: 98.5 (12 Jan 2023 00:31)  HR: 83 (12 Jan 2023 04:30) (75 - 83)  BP: 143/69 (12 Jan 2023 04:30) (110/70 - 143/69)  RR: 16 (12 Jan 2023 04:30) (15 - 16)  SpO2: 94% (12 Jan 2023 04:30) (94% - 96%)  I&O's Summary    11 Jan 2023 07:01  -  12 Jan 2023 07:00  --------------------------------------------------------  IN: 240 mL / OUT: 0 mL / NET: 240 mL      PHYSICAL EXAM:  General: NAD, A/O x 3  ENT: MMM  Neck: Supple, No JVD  Lungs: Clear to auscultation bilaterally  Cardio: RRR, S1/S2, No murmurs  Abdomen: Soft, Nontender, Nondistended; Bowel sounds present  Extremities: No calf tenderness, No pitting edema    LABS:                        9.1    16.39 )-----------( 436      ( 12 Jan 2023 06:30 )             28.3     01-12    138  |  103  |  13  ----------------------------<  115  3.8   |  26  |  0.83    Ca    9.6      12 Jan 2023 06:30    TPro  6.2  /  Alb  2.5  /  TBili  0.4  /  DBili  x   /  AST  19  /  ALT  11  /  AlkPhos  73  01-10                    Culture - Sputum (collected 09 Jan 2023 17:00)  Source: .Sputum Sputum  Gram Stain (10 Michael 2023 05:05):    Few polymorphonuclear leukocytes per low power field    Few Squamous epithelial cells per low power field    Moderate Gram Negative Rods seen per oil power field    Moderate Gram positive cocci in pairs seen per oil power field  Final Report (11 Jan 2023 23:47):    Normal Respiratory Alma Delia present    Culture - Urine (collected 08 Jan 2023 16:34)  Source: Clean Catch Clean Catch (Midstream)  Final Report (09 Jan 2023 16:29):    <10,000 CFU/mL Normal Urogenital Alma Delia    Culture - Blood (collected 08 Jan 2023 13:42)  Source: .Blood Blood-Peripheral  Preliminary Report (09 Jan 2023 22:02):    No growth to date.    Culture - Blood (collected 08 Jan 2023 13:42)  Source: .Blood Blood-Peripheral  Preliminary Report (09 Jan 2023 22:02):    No growth to date.          RADIOLOGY & ADDITIONAL TESTS:  < from: Xray Chest 1 View- PORTABLE-Routine (Xray Chest 1 View- PORTABLE-Routine .) (01.11.23 @ 09:02) >    IMPRESSION:  Small area of atelectasis in the retrocardiac region, unchanged    --- End of Report ---    < end of copied text >      Care Discussed with Consultants/Other Providers:

## 2023-01-12 NOTE — DISCHARGE NOTE PROVIDER - HOSPITAL COURSE
83 y/o F with PMH COPD not on home O2, HTN, HLD, Lung Cancer s/p lung resection in 2018, former smoker, OA, anxiety and depression admitted for severe sepsis due to CAP. fever resolved and leukocytosis improved with IV abx. blood cx negative. Pt feeling better and is stable for discharge home today.    discharge provider: Lencho Castañeda [phone] please call with any questions     antibiotics: ceftin 500mg BID for three more days to 1/14    PMD: Dr. Mead

## 2023-01-12 NOTE — DISCHARGE NOTE PROVIDER - NSDCCPCAREPLAN_GEN_ALL_CORE_FT
PRINCIPAL DISCHARGE DIAGNOSIS  Diagnosis: Community acquired pneumonia of left lower lobe of lung  Assessment and Plan of Treatment: Patient was treated with IV antibiotics and symptoms improved. Please complete oral antibiotics as prescribed

## 2023-01-12 NOTE — DISCHARGE NOTE PROVIDER - NSDCFUSCHEDAPPT_GEN_ALL_CORE_FT
[facility]  [facility]  Scheduled Appointment: 01/19/2023    [facility]  [address]  Scheduled Appointment: 02/08/2023

## 2023-01-12 NOTE — DISCHARGE NOTE NURSING/CASE MANAGEMENT/SOCIAL WORK - NSDCFUADDAPPT_GEN_ALL_CORE_FT
We attempted to make you a hospital followup appointment with Dr. Loaiza but the office is closed today, please call tomorrow to make a hospital followup appointment within 7 days.  Office #[phone].

## 2023-01-13 ENCOUNTER — TRANSCRIPTION ENCOUNTER (OUTPATIENT)
Age: 83
End: 2023-01-13

## 2023-01-13 LAB
CULTURE RESULTS: SIGNIFICANT CHANGE UP
CULTURE RESULTS: SIGNIFICANT CHANGE UP
SPECIMEN SOURCE: SIGNIFICANT CHANGE UP
SPECIMEN SOURCE: SIGNIFICANT CHANGE UP

## 2023-01-19 ENCOUNTER — APPOINTMENT (OUTPATIENT)
Dept: HEMATOLOGY ONCOLOGY | Facility: CLINIC | Age: 83
End: 2023-01-19

## 2023-01-19 PROBLEM — J44.9 CHRONIC OBSTRUCTIVE PULMONARY DISEASE, UNSPECIFIED: Chronic | Status: ACTIVE | Noted: 2023-01-08

## 2023-01-24 ENCOUNTER — APPOINTMENT (OUTPATIENT)
Dept: HEMATOLOGY ONCOLOGY | Facility: CLINIC | Age: 83
End: 2023-01-24

## 2023-02-07 NOTE — DISCHARGE NOTE PROVIDER - NSDCADMDATE_GEN_ALL_CORE_FT
23-Apr-2021 17:44
[FreeTextEntry1] : PT WITH CAD S/P STENTS X3 10/2006, 2 MORE 12/2006, CABG X 3 [facility] 12/21 AFTER AICD D/C, ICM WITH LVEF 25%. 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 \par no pnd, no orthopnea. \par pt not on diuretics. \par \par PT GET phlebotomy  EVERY 2 WEEKS ? POLYCYTHEMIA VERA \par \par 2/7/23: pt here for f/u, pt was 100/70 to 90/70 standing up and says bp at home was 170/70. pt with no dizziness on standing, pt had phlebotomy 1 month ago.pt says stands in yard and stretches at time. \par 11/22: carotid: b/l plaque, less than 50% b/l ICA. \par 11/22: NO evidence AAA, mild atherosclerosis DA\par Pt says sob only with heavy exertion, pt has not had check in over year. \par ntbnp: 335 HDL 39 \par LDL 91

## 2023-02-08 ENCOUNTER — NON-APPOINTMENT (OUTPATIENT)
Age: 83
End: 2023-02-08

## 2023-02-08 ENCOUNTER — APPOINTMENT (OUTPATIENT)
Dept: ELECTROPHYSIOLOGY | Facility: CLINIC | Age: 83
End: 2023-02-08
Payer: MEDICARE

## 2023-02-08 PROCEDURE — 93298 REM INTERROG DEV EVAL SCRMS: CPT

## 2023-02-08 PROCEDURE — G2066: CPT

## 2023-02-13 ENCOUNTER — APPOINTMENT (OUTPATIENT)
Dept: CT IMAGING | Facility: HOSPITAL | Age: 83
End: 2023-02-13
Payer: MEDICARE

## 2023-02-13 ENCOUNTER — OUTPATIENT (OUTPATIENT)
Dept: OUTPATIENT SERVICES | Facility: HOSPITAL | Age: 83
LOS: 1 days | End: 2023-02-13
Payer: MEDICARE

## 2023-02-13 DIAGNOSIS — Z00.8 ENCOUNTER FOR OTHER GENERAL EXAMINATION: ICD-10-CM

## 2023-02-13 DIAGNOSIS — Z90.2 ACQUIRED ABSENCE OF LUNG [PART OF]: Chronic | ICD-10-CM

## 2023-02-13 PROCEDURE — 71250 CT THORAX DX C-: CPT

## 2023-02-13 PROCEDURE — 71250 CT THORAX DX C-: CPT | Mod: 26,MH

## 2023-03-15 ENCOUNTER — NON-APPOINTMENT (OUTPATIENT)
Age: 83
End: 2023-03-15

## 2023-03-15 ENCOUNTER — APPOINTMENT (OUTPATIENT)
Dept: ELECTROPHYSIOLOGY | Facility: CLINIC | Age: 83
End: 2023-03-15
Payer: MEDICARE

## 2023-03-15 PROCEDURE — 93298 REM INTERROG DEV EVAL SCRMS: CPT

## 2023-03-15 PROCEDURE — G2066: CPT

## 2023-03-30 ENCOUNTER — OUTPATIENT (OUTPATIENT)
Dept: OUTPATIENT SERVICES | Facility: HOSPITAL | Age: 83
LOS: 1 days | Discharge: HOME | End: 2023-03-30

## 2023-03-30 DIAGNOSIS — E53.8 DEFICIENCY OF OTHER SPECIFIED B GROUP VITAMINS: ICD-10-CM

## 2023-03-30 DIAGNOSIS — Z90.2 ACQUIRED ABSENCE OF LUNG [PART OF]: Chronic | ICD-10-CM

## 2023-04-03 ENCOUNTER — NON-APPOINTMENT (OUTPATIENT)
Age: 83
End: 2023-04-03

## 2023-04-04 ENCOUNTER — RESULT REVIEW (OUTPATIENT)
Age: 83
End: 2023-04-04

## 2023-04-04 ENCOUNTER — APPOINTMENT (OUTPATIENT)
Dept: INFUSION THERAPY | Facility: HOSPITAL | Age: 83
End: 2023-04-04

## 2023-04-04 ENCOUNTER — APPOINTMENT (OUTPATIENT)
Dept: HEMATOLOGY ONCOLOGY | Facility: CLINIC | Age: 83
End: 2023-04-04
Payer: MEDICARE

## 2023-04-04 ENCOUNTER — APPOINTMENT (OUTPATIENT)
Dept: HEMATOLOGY ONCOLOGY | Facility: CLINIC | Age: 83
End: 2023-04-04

## 2023-04-04 VITALS
OXYGEN SATURATION: 95 % | BODY MASS INDEX: 21 KG/M2 | DIASTOLIC BLOOD PRESSURE: 67 MMHG | SYSTOLIC BLOOD PRESSURE: 134 MMHG | TEMPERATURE: 97 F | WEIGHT: 122.35 LBS | HEART RATE: 93 BPM | RESPIRATION RATE: 16 BRPM

## 2023-04-04 LAB
BASOPHILS # BLD AUTO: 0.07 K/UL — SIGNIFICANT CHANGE UP (ref 0–0.2)
BASOPHILS NFR BLD AUTO: 0.7 % — SIGNIFICANT CHANGE UP (ref 0–2)
EOSINOPHIL # BLD AUTO: 0.21 K/UL — SIGNIFICANT CHANGE UP (ref 0–0.5)
EOSINOPHIL NFR BLD AUTO: 2 % — SIGNIFICANT CHANGE UP (ref 0–6)
HCT VFR BLD CALC: 31 % — LOW (ref 34.5–45)
HGB BLD-MCNC: 10 G/DL — LOW (ref 11.5–15.5)
IMM GRANULOCYTES NFR BLD AUTO: 0.2 % — SIGNIFICANT CHANGE UP (ref 0–0.9)
LYMPHOCYTES # BLD AUTO: 3.22 K/UL — SIGNIFICANT CHANGE UP (ref 1–3.3)
LYMPHOCYTES # BLD AUTO: 31.2 % — SIGNIFICANT CHANGE UP (ref 13–44)
MCHC RBC-ENTMCNC: 28.9 PG — SIGNIFICANT CHANGE UP (ref 27–34)
MCHC RBC-ENTMCNC: 32.3 G/DL — SIGNIFICANT CHANGE UP (ref 32–36)
MCV RBC AUTO: 89.6 FL — SIGNIFICANT CHANGE UP (ref 80–100)
MONOCYTES # BLD AUTO: 1.18 K/UL — HIGH (ref 0–0.9)
MONOCYTES NFR BLD AUTO: 11.4 % — SIGNIFICANT CHANGE UP (ref 2–14)
NEUTROPHILS # BLD AUTO: 5.61 K/UL — SIGNIFICANT CHANGE UP (ref 1.8–7.4)
NEUTROPHILS NFR BLD AUTO: 54.5 % — SIGNIFICANT CHANGE UP (ref 43–77)
NRBC # BLD: 0 /100 WBCS — SIGNIFICANT CHANGE UP (ref 0–0)
PLATELET # BLD AUTO: 326 K/UL — SIGNIFICANT CHANGE UP (ref 150–400)
RBC # BLD: 3.46 M/UL — LOW (ref 3.8–5.2)
RBC # FLD: 14.6 % — HIGH (ref 10.3–14.5)
WBC # BLD: 10.31 K/UL — SIGNIFICANT CHANGE UP (ref 3.8–10.5)
WBC # FLD AUTO: 10.31 K/UL — SIGNIFICANT CHANGE UP (ref 3.8–10.5)

## 2023-04-04 PROCEDURE — 99214 OFFICE O/P EST MOD 30 MIN: CPT

## 2023-04-04 NOTE — PHYSICAL EXAM
[Normal] : affect appropriate [Restricted in physically strenuous activity but ambulatory and able to carry out work of a light or sedentary nature] : Status 1- Restricted in physically strenuous activity but ambulatory and able to carry out work of a light or sedentary nature, e.g., light house work, office work [de-identified] : well healed VATS scar on left from SERA resection;

## 2023-04-04 NOTE — REVIEW OF SYSTEMS
[Negative] : Allergic/Immunologic [Cough] : cough [SOB on Exertion] : shortness of breath during exertion

## 2023-04-04 NOTE — HISTORY OF PRESENT ILLNESS
[100: Normal, no complaints, no evidence of disease.] : 100: Normal, no complaints, no evidence of disease. [ECOG Performance Status: 0 - Fully active, able to carry on all pre-disease performance without restriction] : Performance Status: 0 - Fully active, able to carry on all pre-disease performance without restriction [de-identified] : Ms. 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 She has been seen by Dr. Gopi Norris at [facility] with recommendation to have continued clinical monitoring.\par \par In Summer 2019 she underwent Left upper lobe lung resection for Stage 1A (1.6cm with no LN involvement). She has been followed by Dr. Jean Baptiste with serial CT scans.\par Most recent CT 7/2022 showed 0.9cm RUL nodule which had enlarged compared to prior CT with recommendation for PET/CT and possible surgical intervention. However the patient and her daughter opted to wait to discuss this with me in 9/2022 prior to proceeding.\par \par She now presents for in 9/2022 to establish care at the [facility] [de-identified] : 4/4/2023\par Patient presents today for followup of B12 deficiency, smoldering myeloma and adenoca of lung.\par Since her last visit, patient was hospitalized with pneumonia in early 2023 x 4 days.  She has fully recovered back to her baseline.  \par Nito had been scheduled for VATs procedure to remove right upper lobe hypermetabolic lung nodule.  She subsequently decided not to proceed with surgery. Her PET/CT October 2022 did not reveal any metastatic disease.  She had a CT Chest 2/2023 with showed stability of lung mass.   She has not followed up with Dr. Brooks, but has an appointment with a new pulmonologist in May.  She is minimally symptomatic, she notes intermittent cough and dyspnea with exertion which has no changed dramatically over the last year.  SHe does have a nebulizer and inhalers at home, but rarely uses.\par \par She denies any fever, chills, bone pain, abdominal pain or appetite loss.

## 2023-04-04 NOTE — ASSESSMENT
[FreeTextEntry1] : 83 yo woman with B12 deficiency anemia, Stage 1 Adenocarcinoma of the lung in 2019 and smoldering myeloma\par \par B12 deficiency - Hgb 10.0 g/dl today \par - resume B12 1000mcg IM every 6-8 weeks - injection today \par - will monitor B12 levels periodically\par \par Smoldering myeloma\par - will obtain old bone marrow report from [facility]\par - will continue to monitor for hypercalcemia, renal insufficiency and anemia\par - continue to monitor immunoglobulin levels and M-spike\par \par Lung Adenocarcinoma\par - Patient wishes to continue with monitoring and does not wish to proceed with any further surgery.\par --She will followup with pulmonary to medical management of any respiratory symptoms.\par - will discuss w/ Dr. Hancock if there is any role for repeat PEt/CT imaging in the absence of symptoms\par \par - RTO in 6 weeks for next B12 injection;12 weeks for f/up visit with Dr. Hancock (scheduled)\par \par Patient had an opportunity to have her questions asked and answered to her satisfaction.\par \par

## 2023-04-05 LAB
ALBUMIN SERPL ELPH-MCNC: 4.5 G/DL
ALP BLD-CCNC: 94 U/L
ALT SERPL-CCNC: 14 U/L
ANION GAP SERPL CALC-SCNC: 15 MMOL/L
AST SERPL-CCNC: 21 U/L
BILIRUB SERPL-MCNC: 0.3 MG/DL
BUN SERPL-MCNC: 28 MG/DL
CALCIUM SERPL-MCNC: 10.1 MG/DL
CEA SERPL-MCNC: 4.7 NG/ML
CHLORIDE SERPL-SCNC: 101 MMOL/L
CO2 SERPL-SCNC: 21 MMOL/L
CREAT SERPL-MCNC: 0.93 MG/DL
EGFR: 62 ML/MIN/1.73M2
FOLATE SERPL-MCNC: 5.4 NG/ML
GLUCOSE SERPL-MCNC: 101 MG/DL
LDH SERPL-CCNC: 150 U/L
POTASSIUM SERPL-SCNC: 4.2 MMOL/L
PROT SERPL-MCNC: 7.7 G/DL
SODIUM SERPL-SCNC: 138 MMOL/L
VIT B12 SERPL-MCNC: 659 PG/ML

## 2023-04-11 LAB
ALBUMIN MFR SERPL ELPH: 54.1 %
ALBUMIN SERPL-MCNC: 4.2 G/DL
ALBUMIN/GLOB SERPL: 1.2 RATIO
ALPHA1 GLOB MFR SERPL ELPH: 4.1 %
ALPHA1 GLOB SERPL ELPH-MCNC: 0.3 G/DL
ALPHA2 GLOB MFR SERPL ELPH: 10.3 %
ALPHA2 GLOB SERPL ELPH-MCNC: 0.8 G/DL
B-GLOBULIN MFR SERPL ELPH: 9.9 %
B-GLOBULIN SERPL ELPH-MCNC: 0.8 G/DL
DEPRECATED KAPPA LC FREE/LAMBDA SER: 87.25 RATIO
GAMMA GLOB FLD ELPH-MCNC: 1.7 G/DL
GAMMA GLOB MFR SERPL ELPH: 21.6 %
IGA SER QL IEP: 67 MG/DL
IGG SER QL IEP: 1455 MG/DL
IGM SER QL IEP: 46 MG/DL
INTERPRETATION SERPL IEP-IMP: NORMAL
KAPPA LC CSF-MCNC: 0.71 MG/DL
KAPPA LC SERPL-MCNC: 61.95 MG/DL
M PROTEIN MFR SERPL ELPH: 19.2 %
M PROTEIN SPEC IFE-MCNC: NORMAL
MONOCLON BAND OBS SERPL: 1.5 G/DL
PROT SERPL-MCNC: 7.7 G/DL
PROT SERPL-MCNC: 7.7 G/DL

## 2023-04-19 ENCOUNTER — APPOINTMENT (OUTPATIENT)
Dept: ELECTROPHYSIOLOGY | Facility: CLINIC | Age: 83
End: 2023-04-19
Payer: MEDICARE

## 2023-04-19 ENCOUNTER — NON-APPOINTMENT (OUTPATIENT)
Age: 83
End: 2023-04-19

## 2023-04-19 PROCEDURE — 93298 REM INTERROG DEV EVAL SCRMS: CPT

## 2023-04-19 PROCEDURE — G2066: CPT

## 2023-05-15 ENCOUNTER — APPOINTMENT (OUTPATIENT)
Dept: INFUSION THERAPY | Facility: HOSPITAL | Age: 83
End: 2023-05-15

## 2023-05-24 ENCOUNTER — NON-APPOINTMENT (OUTPATIENT)
Age: 83
End: 2023-05-24

## 2023-05-24 ENCOUNTER — APPOINTMENT (OUTPATIENT)
Dept: ELECTROPHYSIOLOGY | Facility: CLINIC | Age: 83
End: 2023-05-24
Payer: MEDICARE

## 2023-05-24 PROCEDURE — 93298 REM INTERROG DEV EVAL SCRMS: CPT

## 2023-05-24 PROCEDURE — G2066: CPT

## 2023-06-21 ENCOUNTER — OUTPATIENT (OUTPATIENT)
Dept: OUTPATIENT SERVICES | Facility: HOSPITAL | Age: 83
LOS: 1 days | Discharge: HOME | End: 2023-06-21

## 2023-06-21 DIAGNOSIS — Z90.2 ACQUIRED ABSENCE OF LUNG [PART OF]: Chronic | ICD-10-CM

## 2023-06-21 DIAGNOSIS — E53.8 DEFICIENCY OF OTHER SPECIFIED B GROUP VITAMINS: ICD-10-CM

## 2023-06-26 ENCOUNTER — APPOINTMENT (OUTPATIENT)
Dept: ELECTROPHYSIOLOGY | Facility: CLINIC | Age: 83
End: 2023-06-26
Payer: MEDICARE

## 2023-06-26 ENCOUNTER — NON-APPOINTMENT (OUTPATIENT)
Age: 83
End: 2023-06-26

## 2023-06-26 PROCEDURE — 93298 REM INTERROG DEV EVAL SCRMS: CPT

## 2023-06-26 PROCEDURE — G2066: CPT

## 2023-07-03 ENCOUNTER — RESULT REVIEW (OUTPATIENT)
Age: 83
End: 2023-07-03

## 2023-07-03 ENCOUNTER — APPOINTMENT (OUTPATIENT)
Dept: HEMATOLOGY ONCOLOGY | Facility: CLINIC | Age: 83
End: 2023-07-03
Payer: MEDICARE

## 2023-07-03 ENCOUNTER — APPOINTMENT (OUTPATIENT)
Dept: HEMATOLOGY ONCOLOGY | Facility: CLINIC | Age: 83
End: 2023-07-03

## 2023-07-03 ENCOUNTER — APPOINTMENT (OUTPATIENT)
Dept: INFUSION THERAPY | Facility: HOSPITAL | Age: 83
End: 2023-07-03

## 2023-07-03 VITALS
RESPIRATION RATE: 17 BRPM | BODY MASS INDEX: 20.89 KG/M2 | HEART RATE: 90 BPM | SYSTOLIC BLOOD PRESSURE: 127 MMHG | HEIGHT: 63.98 IN | DIASTOLIC BLOOD PRESSURE: 61 MMHG | OXYGEN SATURATION: 98 % | WEIGHT: 122.33 LBS

## 2023-07-03 LAB
BASOPHILS # BLD AUTO: 0.06 K/UL — SIGNIFICANT CHANGE UP (ref 0–0.2)
BASOPHILS NFR BLD AUTO: 0.5 % — SIGNIFICANT CHANGE UP (ref 0–2)
EOSINOPHIL # BLD AUTO: 0.2 K/UL — SIGNIFICANT CHANGE UP (ref 0–0.5)
EOSINOPHIL NFR BLD AUTO: 1.7 % — SIGNIFICANT CHANGE UP (ref 0–6)
HCT VFR BLD CALC: 28.7 % — LOW (ref 34.5–45)
HGB BLD-MCNC: 9.1 G/DL — LOW (ref 11.5–15.5)
IMM GRANULOCYTES NFR BLD AUTO: 0.5 % — SIGNIFICANT CHANGE UP (ref 0–0.9)
LYMPHOCYTES # BLD AUTO: 2.46 K/UL — SIGNIFICANT CHANGE UP (ref 1–3.3)
LYMPHOCYTES # BLD AUTO: 21.4 % — SIGNIFICANT CHANGE UP (ref 13–44)
MCHC RBC-ENTMCNC: 28 PG — SIGNIFICANT CHANGE UP (ref 27–34)
MCHC RBC-ENTMCNC: 31.7 G/DL — LOW (ref 32–36)
MCV RBC AUTO: 88.3 FL — SIGNIFICANT CHANGE UP (ref 80–100)
MONOCYTES # BLD AUTO: 1.09 K/UL — HIGH (ref 0–0.9)
MONOCYTES NFR BLD AUTO: 9.5 % — SIGNIFICANT CHANGE UP (ref 2–14)
NEUTROPHILS # BLD AUTO: 7.6 K/UL — HIGH (ref 1.8–7.4)
NEUTROPHILS NFR BLD AUTO: 66.4 % — SIGNIFICANT CHANGE UP (ref 43–77)
NRBC # BLD: 0 /100 WBCS — SIGNIFICANT CHANGE UP (ref 0–0)
PLATELET # BLD AUTO: 332 K/UL — SIGNIFICANT CHANGE UP (ref 150–400)
RBC # BLD: 3.25 M/UL — LOW (ref 3.8–5.2)
RBC # FLD: 16 % — HIGH (ref 10.3–14.5)
WBC # BLD: 11.47 K/UL — HIGH (ref 3.8–10.5)
WBC # FLD AUTO: 11.47 K/UL — HIGH (ref 3.8–10.5)

## 2023-07-03 PROCEDURE — 99214 OFFICE O/P EST MOD 30 MIN: CPT

## 2023-07-03 NOTE — HISTORY OF PRESENT ILLNESS
[de-identified] : Ms. 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 She has been seen by Dr. Gopi Norris at [facility] with recommendation to have continued clinical monitoring.\par \par In Summer 2019 she underwent Left upper lobe lung resection for Stage 1A (1.6cm with no LN involvement). She has been followed by Dr. Jean Baptiste with serial CT scans.\par Most recent CT 7/2022 showed 0.9cm RUL nodule which had enlarged compared to prior CT with recommendation for PET/CT and possible surgical intervention. However the patient and her daughter opted to wait to discuss this with me in 9/2022 prior to proceeding.\par \par Transferred care from [facility] to  [facility] in   9/2022  [de-identified] : 7/3/23\garry Patient presents today for followup of B12 deficiency, smoldering myeloma and adenoca of lung.\garry was hospitalized with pneumonia in early 2023 x 4 days.  She has fully recovered back to her baseline.  \garry Beauchamp had been scheduled for VATs procedure to remove right upper lobe hypermetabolic lung nodule.  She subsequently decided not to proceed with surgery. Her PET/CT October 2022 did not reveal any metastatic disease.  She had a CT Chest 2/2023 with showed stability of lung mass.   She has not followed up with Dr. Brooks\par - following with Pulmonary (Dr. Flores)\par - She is minimally symptomatic, she notes intermittent cough and dyspnea with exertion which has no changed dramatically over the last year.  She does have a nebulizer and inhalers at home, but rarely uses\par \par She denies any fever, chills, bone pain, abdominal pain or appetite loss. [100: Normal, no complaints, no evidence of disease.] : 100: Normal, no complaints, no evidence of disease. [ECOG Performance Status: 0 - Fully active, able to carry on all pre-disease performance without restriction] : Performance Status: 0 - Fully active, able to carry on all pre-disease performance without restriction

## 2023-07-03 NOTE — ASSESSMENT
[FreeTextEntry1] : 82 yo woman with B12 deficiency anemia, Stage 1 Adenocarcinoma of the lung in 2019 and smoldering myeloma\par \par B12 deficiency - Hgb 9.10 g/dl today \par - resume B12 1000mcg IM every 6-8 weeks - injection today \par - will monitor B12 levels periodically\par - will also check iron studies today given progressive microcytosis\par \par Smoldering myeloma\par - reviewed path reports from [facility] done in 4/2015 and 12/2019\par - was noted to have 20% plasma cell population with FISH panel showing loss of 14q and loss of 17p; despite 17p deletion being associated with unfavorable prognosis, patient was recommended by Dr. Gopi Norris to contineu observation\par - will continue to monitor for hypercalcemia, renal insufficiency and anemia\par - continue to monitor immunoglobulin levels and M-spike at least biannually\par \par Lung Adenocarcinoma\par - Patient wishes to continue with monitoring and does not wish to proceed with any further surgery.\par -She will followup with pulmonary to medical management of any respiratory symptoms.\par - last PET  imaging in 10/2022; will consider annually more-so to monitor for early bone abnormalities associated with plasma cell disorder evolution into myeloma from smoldering disease\par - to follow up with Dr. Flores (pulmonary)\par - repeat CT chest now to evaluate underlying disease\par \par \par Patient had an opportunity to have her questions asked and answered to her satisfaction.\par Will arrange follow-up pending lab studies; discussed possible need for repeat Bone marrow aspirate/biopsy \par

## 2023-07-03 NOTE — PHYSICAL EXAM
[Restricted in physically strenuous activity but ambulatory and able to carry out work of a light or sedentary nature] : Status 1- Restricted in physically strenuous activity but ambulatory and able to carry out work of a light or sedentary nature, e.g., light house work, office work [Normal] : affect appropriate [de-identified] : well healed VATS scar on left from SERA resection;

## 2023-07-07 ENCOUNTER — NON-APPOINTMENT (OUTPATIENT)
Age: 83
End: 2023-07-07

## 2023-07-07 LAB
ALBUMIN MFR SERPL ELPH: 54.7 %
ALBUMIN SERPL ELPH-MCNC: 4.5 G/DL
ALBUMIN SERPL-MCNC: 4 G/DL
ALBUMIN/GLOB SERPL: 1.2 RATIO
ALP BLD-CCNC: 88 U/L
ALPHA1 GLOB MFR SERPL ELPH: 4 %
ALPHA1 GLOB SERPL ELPH-MCNC: 0.3 G/DL
ALPHA2 GLOB MFR SERPL ELPH: 9.9 %
ALPHA2 GLOB SERPL ELPH-MCNC: 0.7 G/DL
ALT SERPL-CCNC: 13 U/L
ANION GAP SERPL CALC-SCNC: 13 MMOL/L
AST SERPL-CCNC: 22 U/L
B-GLOBULIN MFR SERPL ELPH: 9.6 %
B-GLOBULIN SERPL ELPH-MCNC: 0.7 G/DL
BILIRUB SERPL-MCNC: 0.3 MG/DL
BUN SERPL-MCNC: 21 MG/DL
CALCIUM SERPL-MCNC: 10.2 MG/DL
CEA SERPL-MCNC: 4.7 NG/ML
CHLORIDE SERPL-SCNC: 105 MMOL/L
CO2 SERPL-SCNC: 23 MMOL/L
CREAT SERPL-MCNC: 0.85 MG/DL
DEPRECATED KAPPA LC FREE/LAMBDA SER: 84.22 RATIO
EGFR: 69 ML/MIN/1.73M2
FERRITIN SERPL-MCNC: 17 NG/ML
FOLATE SERPL-MCNC: 7.3 NG/ML
FOLATE SERPL-MCNC: 7.8 NG/ML
GAMMA GLOB FLD ELPH-MCNC: 1.6 G/DL
GAMMA GLOB MFR SERPL ELPH: 21.8 %
GLUCOSE SERPL-MCNC: 110 MG/DL
IGA SER QL IEP: 69 MG/DL
IGG SER QL IEP: 1269 MG/DL
IGM SER QL IEP: 56 MG/DL
INTERPRETATION SERPL IEP-IMP: NORMAL
IRON SATN MFR SERPL: 7 %
IRON SERPL-MCNC: 25 UG/DL
KAPPA LC CSF-MCNC: 0.65 MG/DL
KAPPA LC SERPL-MCNC: 54.74 MG/DL
M PROTEIN MFR SERPL ELPH: 19.4 %
M PROTEIN SPEC IFE-MCNC: NORMAL
MONOCLON BAND OBS SERPL: 1.4 G/DL
POTASSIUM SERPL-SCNC: 4.3 MMOL/L
PROT SERPL-MCNC: 7.3 G/DL
SODIUM SERPL-SCNC: 141 MMOL/L
TIBC SERPL-MCNC: 370 UG/DL
UIBC SERPL-MCNC: 345 UG/DL
VIT B12 SERPL-MCNC: 597 PG/ML
VIT B12 SERPL-MCNC: 600 PG/ML

## 2023-07-11 ENCOUNTER — OUTPATIENT (OUTPATIENT)
Dept: OUTPATIENT SERVICES | Facility: HOSPITAL | Age: 83
LOS: 1 days | End: 2023-07-11
Payer: MEDICARE

## 2023-07-11 ENCOUNTER — APPOINTMENT (OUTPATIENT)
Dept: CT IMAGING | Facility: HOSPITAL | Age: 83
End: 2023-07-11
Payer: MEDICARE

## 2023-07-11 DIAGNOSIS — R91.1 SOLITARY PULMONARY NODULE: ICD-10-CM

## 2023-07-11 DIAGNOSIS — C34.90 MALIGNANT NEOPLASM OF UNSPECIFIED PART OF UNSPECIFIED BRONCHUS OR LUNG: ICD-10-CM

## 2023-07-11 DIAGNOSIS — Z90.2 ACQUIRED ABSENCE OF LUNG [PART OF]: Chronic | ICD-10-CM

## 2023-07-11 PROCEDURE — 71250 CT THORAX DX C-: CPT

## 2023-07-11 PROCEDURE — 71250 CT THORAX DX C-: CPT | Mod: 26,MH

## 2023-07-31 ENCOUNTER — APPOINTMENT (OUTPATIENT)
Dept: ELECTROPHYSIOLOGY | Facility: CLINIC | Age: 83
End: 2023-07-31
Payer: MEDICARE

## 2023-07-31 ENCOUNTER — NON-APPOINTMENT (OUTPATIENT)
Age: 83
End: 2023-07-31

## 2023-07-31 PROCEDURE — 93298 REM INTERROG DEV EVAL SCRMS: CPT

## 2023-07-31 PROCEDURE — G2066: CPT

## 2023-08-14 ENCOUNTER — APPOINTMENT (OUTPATIENT)
Dept: HEMATOLOGY ONCOLOGY | Facility: CLINIC | Age: 83
End: 2023-08-14
Payer: MEDICARE

## 2023-08-14 ENCOUNTER — RESULT REVIEW (OUTPATIENT)
Age: 83
End: 2023-08-14

## 2023-08-14 ENCOUNTER — APPOINTMENT (OUTPATIENT)
Dept: INFUSION THERAPY | Facility: HOSPITAL | Age: 83
End: 2023-08-14

## 2023-08-14 VITALS
WEIGHT: 121.98 LBS | RESPIRATION RATE: 18 BRPM | SYSTOLIC BLOOD PRESSURE: 117 MMHG | TEMPERATURE: 97.2 F | OXYGEN SATURATION: 96 % | DIASTOLIC BLOOD PRESSURE: 60 MMHG | BODY MASS INDEX: 20.96 KG/M2 | HEART RATE: 90 BPM

## 2023-08-14 LAB
BASOPHILS # BLD AUTO: 0.1 K/UL — SIGNIFICANT CHANGE UP (ref 0–0.2)
BASOPHILS NFR BLD AUTO: 1 % — SIGNIFICANT CHANGE UP (ref 0–2)
EOSINOPHIL # BLD AUTO: 0.14 K/UL — SIGNIFICANT CHANGE UP (ref 0–0.5)
EOSINOPHIL NFR BLD AUTO: 1.3 % — SIGNIFICANT CHANGE UP (ref 0–6)
HCT VFR BLD CALC: 30.9 % — LOW (ref 34.5–45)
HGB BLD-MCNC: 9.7 G/DL — LOW (ref 11.5–15.5)
IMM GRANULOCYTES NFR BLD AUTO: 0.4 % — SIGNIFICANT CHANGE UP (ref 0–0.9)
LYMPHOCYTES # BLD AUTO: 3.19 K/UL — SIGNIFICANT CHANGE UP (ref 1–3.3)
LYMPHOCYTES # BLD AUTO: 30.4 % — SIGNIFICANT CHANGE UP (ref 13–44)
MCHC RBC-ENTMCNC: 28.4 PG — SIGNIFICANT CHANGE UP (ref 27–34)
MCHC RBC-ENTMCNC: 31.4 G/DL — LOW (ref 32–36)
MCV RBC AUTO: 90.4 FL — SIGNIFICANT CHANGE UP (ref 80–100)
MONOCYTES # BLD AUTO: 1.2 K/UL — HIGH (ref 0–0.9)
MONOCYTES NFR BLD AUTO: 11.5 % — SIGNIFICANT CHANGE UP (ref 2–14)
NEUTROPHILS # BLD AUTO: 5.81 K/UL — SIGNIFICANT CHANGE UP (ref 1.8–7.4)
NEUTROPHILS NFR BLD AUTO: 55.4 % — SIGNIFICANT CHANGE UP (ref 43–77)
NRBC # BLD: 0 /100 WBCS — SIGNIFICANT CHANGE UP (ref 0–0)
PLATELET # BLD AUTO: 328 K/UL — SIGNIFICANT CHANGE UP (ref 150–400)
RBC # BLD: 3.42 M/UL — LOW (ref 3.8–5.2)
RBC # FLD: 17.8 % — HIGH (ref 10.3–14.5)
WBC # BLD: 10.48 K/UL — SIGNIFICANT CHANGE UP (ref 3.8–10.5)
WBC # FLD AUTO: 10.48 K/UL — SIGNIFICANT CHANGE UP (ref 3.8–10.5)

## 2023-08-14 PROCEDURE — 99214 OFFICE O/P EST MOD 30 MIN: CPT

## 2023-08-14 NOTE — REASON FOR VISIT
[Other: _____] : [unfilled] [Follow-Up Visit] : a follow-up [FreeTextEntry2] : B12 deficiency/MM/lung cancer

## 2023-08-14 NOTE — ASSESSMENT
[FreeTextEntry1] : 82 yo woman with multifactorial anemia due to B12 deficiency anemia and iron deficinecy, Stage 1 Adenocarcinoma of the lung in 2019 and smoldering myeloma  B12 deficiency - Hgb 9. 10  g/dl today - change - resume B12 1000mcg IM every 6-8 weeks - injection today  - will monitor B12 levels periodically  Iron Deficiency Anemia  - tolerating Slow-FE orally  - will check iron studies today; if still low may consider IV iron  - discussed GI workup again - patient is reluctant to undergo any further testing.  I did recommend GI evaluation given her new onset diarrhea.   Smoldering myeloma - reviewed path reports from BM Bx done in 4/2015 and 12/2019 - was noted to have 20% plasma cell population with FISH panel showing loss of 14q and loss of 17p; despite 17p deletion being associated with unfavorable prognosis, patient was recommended by Dr. Gopi Norris to contineu observation - will continue to monitor for hypercalcemia, renal insufficiency and anemia - continue to monitor immunoglobulin levels and M-spike at least biannually  Lung Adenocarcinoma - Patient wishes to continue with monitoring and does not wish to proceed with any further surgery. -She will followup with pulmonary to medical management of any respiratory symptoms. - last PET  imaging in 10/2022; will consider annually more-so to monitor for early bone abnormalities associated with plasma cell disorder evolution into myeloma from smoldering disease - to follow up with Dr. Flores (pulmonary) - f/up CT chest 3 months (10/2023)   Patient had an opportunity to have her questions asked and answered to her satisfaction. Will arrange follow-up pending lab studies; discussed possible need for repeat Bone marrow aspirate/biopsy

## 2023-08-14 NOTE — HISTORY OF PRESENT ILLNESS
[100: Normal, no complaints, no evidence of disease.] : 100: Normal, no complaints, no evidence of disease. [ECOG Performance Status: 0 - Fully active, able to carry on all pre-disease performance without restriction] : Performance Status: 0 - Fully active, able to carry on all pre-disease performance without restriction [de-identified] : Ms. 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 \par  She wasn noted to have monoclonal gammopathy and bone marrow with 20% plasma cells with no evidence of bone lesions on either PET/CT in 8/2019 or MRI in 2/2022, no evidence of renal insufficiency or hypercalcemia. She has been seen by Dr. Gopi Norris at [facility] with recommendation to have continued clinical monitoring.\par  \par  In Summer 2019 she underwent Left upper lobe lung resection for Stage 1A (1.6cm with no LN involvement). She has been followed by Dr. Jean Baptiste with serial CT scans.\par  Most recent CT 7/2022 showed 0.9cm RUL nodule which had enlarged compared to prior CT with recommendation for PET/CT and possible surgical intervention. However the patient and her daughter opted to wait to discuss this with me in 9/2022 prior to proceeding.\par  \par  Transferred care from [facility] to  [facility] ([facility]) in   9/2022  [de-identified] : 8/14/2023 Patient presents today for followup of B12 deficiency, smoldering myeloma and adenoca of lung. Also noted to have iron deficiency at her last visit and was started on Slow-FE biddosing.  She is tolerating well.  No constipation or abdominal discomfort.  She has not seen her gastro for possible GI workup since speaking with Dr. Hancock.  She does note occasional diarrhea which occurs without clear precipitating cause.    continues to receive B12 injections every 6-8 weeks without incident at [facility].   Nito had been scheduled for VATs procedure to remove right upper lobe hypermetabolic lung nodule.  She subsequently decided not to proceed with surgery. Her PET/CT October 2022 did not reveal any metastatic disease.  She had a CT Chest 2/2023 and 7/2023 with showed stability of lung mass.    CT in July 2023 showed possible inflammatory/infectious area in left lower lobe - 3 month f/up recommended (Due 10/2023 - She has not followed up with Dr. Brooks - following with Pulmonary (Dr. Flores) - She is minimally symptomatic, she notes intermittent cough and dyspnea with exertion which has no changed dramatically over the last year.  She does have a nebulizer and  at home,   She denies any fever, chills, bone pain, abdominal pain or appetite loss.

## 2023-08-14 NOTE — PHYSICAL EXAM
[Restricted in physically strenuous activity but ambulatory and able to carry out work of a light or sedentary nature] : Status 1- Restricted in physically strenuous activity but ambulatory and able to carry out work of a light or sedentary nature, e.g., light house work, office work [Normal] : affect appropriate [de-identified] : well healed VATS scar on left from SERA resection;

## 2023-08-15 LAB
FERRITIN SERPL-MCNC: 27 NG/ML
FOLATE SERPL-MCNC: 9.6 NG/ML
IRON SATN MFR SERPL: 8 %
IRON SERPL-MCNC: 28 UG/DL
TIBC SERPL-MCNC: 366 UG/DL
UIBC SERPL-MCNC: 338 UG/DL
VIT B12 SERPL-MCNC: 691 PG/ML

## 2023-08-17 ENCOUNTER — NON-APPOINTMENT (OUTPATIENT)
Age: 83
End: 2023-08-17

## 2023-08-17 LAB
ALBUMIN MFR SERPL ELPH: 54.7 %
ALBUMIN SERPL-MCNC: 4 G/DL
ALBUMIN/GLOB SERPL: 1.2 RATIO
ALPHA1 GLOB MFR SERPL ELPH: 4 %
ALPHA1 GLOB SERPL ELPH-MCNC: 0.3 G/DL
ALPHA2 GLOB MFR SERPL ELPH: 9.8 %
ALPHA2 GLOB SERPL ELPH-MCNC: 0.7 G/DL
B-GLOBULIN MFR SERPL ELPH: 9.4 %
B-GLOBULIN SERPL ELPH-MCNC: 0.7 G/DL
DEPRECATED KAPPA LC FREE/LAMBDA SER: 87.51 RATIO
GAMMA GLOB FLD ELPH-MCNC: 1.6 G/DL
GAMMA GLOB MFR SERPL ELPH: 22.1 %
IGA SER QL IEP: 73 MG/DL
IGG SER QL IEP: 1322 MG/DL
IGM SER QL IEP: 50 MG/DL
INTERPRETATION SERPL IEP-IMP: NORMAL
KAPPA LC CSF-MCNC: 0.61 MG/DL
KAPPA LC SERPL-MCNC: 53.38 MG/DL
M PROTEIN MFR SERPL ELPH: 19.7 %
M PROTEIN SPEC IFE-MCNC: NORMAL
MONOCLON BAND OBS SERPL: 1.5 G/DL
PROT SERPL-MCNC: 7.4 G/DL
PROT SERPL-MCNC: 7.4 G/DL

## 2023-08-31 ENCOUNTER — APPOINTMENT (OUTPATIENT)
Dept: ELECTROPHYSIOLOGY | Facility: CLINIC | Age: 83
End: 2023-08-31
Payer: MEDICARE

## 2023-08-31 ENCOUNTER — NON-APPOINTMENT (OUTPATIENT)
Age: 83
End: 2023-08-31

## 2023-09-01 PROCEDURE — G2066: CPT

## 2023-09-01 PROCEDURE — 93298 REM INTERROG DEV EVAL SCRMS: CPT

## 2023-09-11 NOTE — ED PROVIDER NOTE - NS ED ATTENDING STATEMENT MOD
Number Of Hemigard Strips Per Side: 1 I have personally performed a face to face diagnostic evaluation on this patient. I have reviewed the ACP note and agree with the history, exam and plan of care, except as noted.

## 2023-09-14 ENCOUNTER — OUTPATIENT (OUTPATIENT)
Dept: OUTPATIENT SERVICES | Facility: HOSPITAL | Age: 83
LOS: 1 days | Discharge: HOME | End: 2023-09-14

## 2023-09-14 DIAGNOSIS — E53.8 DEFICIENCY OF OTHER SPECIFIED B GROUP VITAMINS: ICD-10-CM

## 2023-09-14 DIAGNOSIS — Z90.2 ACQUIRED ABSENCE OF LUNG [PART OF]: Chronic | ICD-10-CM

## 2023-09-28 ENCOUNTER — APPOINTMENT (OUTPATIENT)
Dept: INFUSION THERAPY | Facility: HOSPITAL | Age: 83
End: 2023-09-28

## 2023-09-28 ENCOUNTER — APPOINTMENT (OUTPATIENT)
Dept: HEMATOLOGY ONCOLOGY | Facility: CLINIC | Age: 83
End: 2023-09-28

## 2023-09-28 ENCOUNTER — RESULT REVIEW (OUTPATIENT)
Age: 83
End: 2023-09-28

## 2023-09-28 ENCOUNTER — APPOINTMENT (OUTPATIENT)
Dept: HEMATOLOGY ONCOLOGY | Facility: CLINIC | Age: 83
End: 2023-09-28
Payer: MEDICARE

## 2023-09-28 VITALS
HEIGHT: 63.98 IN | OXYGEN SATURATION: 97 % | SYSTOLIC BLOOD PRESSURE: 136 MMHG | HEART RATE: 92 BPM | DIASTOLIC BLOOD PRESSURE: 70 MMHG | RESPIRATION RATE: 17 BRPM | BODY MASS INDEX: 20.47 KG/M2 | WEIGHT: 119.93 LBS | TEMPERATURE: 97.3 F

## 2023-09-28 LAB
BASOPHILS # BLD AUTO: 0.08 K/UL — SIGNIFICANT CHANGE UP (ref 0–0.2)
BASOPHILS NFR BLD AUTO: 0.8 % — SIGNIFICANT CHANGE UP (ref 0–2)
EOSINOPHIL # BLD AUTO: 0.19 K/UL — SIGNIFICANT CHANGE UP (ref 0–0.5)
EOSINOPHIL NFR BLD AUTO: 1.8 % — SIGNIFICANT CHANGE UP (ref 0–6)
HCT VFR BLD CALC: 36 % — SIGNIFICANT CHANGE UP (ref 34.5–45)
HGB BLD-MCNC: 11.5 G/DL — SIGNIFICANT CHANGE UP (ref 11.5–15.5)
IMM GRANULOCYTES NFR BLD AUTO: 0.4 % — SIGNIFICANT CHANGE UP (ref 0–0.9)
IRON SATN MFR SERPL: 36 %
IRON SERPL-MCNC: 119 UG/DL
LYMPHOCYTES # BLD AUTO: 2.87 K/UL — SIGNIFICANT CHANGE UP (ref 1–3.3)
LYMPHOCYTES # BLD AUTO: 27 % — SIGNIFICANT CHANGE UP (ref 13–44)
MCHC RBC-ENTMCNC: 29.3 PG — SIGNIFICANT CHANGE UP (ref 27–34)
MCHC RBC-ENTMCNC: 31.9 G/DL — LOW (ref 32–36)
MCV RBC AUTO: 91.6 FL — SIGNIFICANT CHANGE UP (ref 80–100)
MONOCYTES # BLD AUTO: 1.26 K/UL — HIGH (ref 0–0.9)
MONOCYTES NFR BLD AUTO: 11.9 % — SIGNIFICANT CHANGE UP (ref 2–14)
NEUTROPHILS # BLD AUTO: 6.19 K/UL — SIGNIFICANT CHANGE UP (ref 1.8–7.4)
NEUTROPHILS NFR BLD AUTO: 58.1 % — SIGNIFICANT CHANGE UP (ref 43–77)
NRBC # BLD: 0 /100 WBCS — SIGNIFICANT CHANGE UP (ref 0–0)
PLATELET # BLD AUTO: 320 K/UL — SIGNIFICANT CHANGE UP (ref 150–400)
RBC # BLD: 3.93 M/UL — SIGNIFICANT CHANGE UP (ref 3.8–5.2)
RBC # FLD: 16.8 % — HIGH (ref 10.3–14.5)
TIBC SERPL-MCNC: 327 UG/DL
UIBC SERPL-MCNC: 208 UG/DL
WBC # BLD: 10.63 K/UL — HIGH (ref 3.8–10.5)
WBC # FLD AUTO: 10.63 K/UL — HIGH (ref 3.8–10.5)

## 2023-09-28 PROCEDURE — 99214 OFFICE O/P EST MOD 30 MIN: CPT

## 2023-09-29 LAB
FERRITIN SERPL-MCNC: 53 NG/ML
FOLATE SERPL-MCNC: 7.2 NG/ML
VIT B12 SERPL-MCNC: 602 PG/ML

## 2023-10-02 LAB
ALBUMIN MFR SERPL ELPH: 55.8 %
ALBUMIN SERPL-MCNC: 4.4 G/DL
ALBUMIN/GLOB SERPL: 1.3 RATIO
ALPHA1 GLOB MFR SERPL ELPH: 4 %
ALPHA1 GLOB SERPL ELPH-MCNC: 0.3 G/DL
ALPHA2 GLOB MFR SERPL ELPH: 9.7 %
ALPHA2 GLOB SERPL ELPH-MCNC: 0.8 G/DL
B-GLOBULIN MFR SERPL ELPH: 8.9 %
B-GLOBULIN SERPL ELPH-MCNC: 0.7 G/DL
DEPRECATED KAPPA LC FREE/LAMBDA SER: 94.29 RATIO
GAMMA GLOB FLD ELPH-MCNC: 1.7 G/DL
GAMMA GLOB MFR SERPL ELPH: 21.6 %
IGA SER QL IEP: 79 MG/DL
IGG SER QL IEP: 1358 MG/DL
IGM SER QL IEP: 53 MG/DL
INTERPRETATION SERPL IEP-IMP: NORMAL
KAPPA LC CSF-MCNC: 0.62 MG/DL
KAPPA LC SERPL-MCNC: 58.46 MG/DL
M PROTEIN MFR SERPL ELPH: 19.8 %
M PROTEIN SPEC IFE-MCNC: NORMAL
MONOCLON BAND OBS SERPL: 1.5 G/DL
PROT SERPL-MCNC: 7.8 G/DL
PROT SERPL-MCNC: 7.8 G/DL

## 2023-10-05 ENCOUNTER — NON-APPOINTMENT (OUTPATIENT)
Age: 83
End: 2023-10-05

## 2023-10-05 ENCOUNTER — APPOINTMENT (OUTPATIENT)
Dept: ELECTROPHYSIOLOGY | Facility: CLINIC | Age: 83
End: 2023-10-05
Payer: MEDICARE

## 2023-10-05 PROCEDURE — G2066: CPT

## 2023-10-05 PROCEDURE — 93298 REM INTERROG DEV EVAL SCRMS: CPT

## 2023-10-11 ENCOUNTER — APPOINTMENT (OUTPATIENT)
Dept: GASTROENTEROLOGY | Facility: CLINIC | Age: 83
End: 2023-10-11

## 2023-10-30 NOTE — ED ADULT TRIAGE NOTE - DOMESTIC TRAVEL HIGH RISK QUESTION
Physical Therapy  Facility/Department: [facility]  Physical Therapy Treatment    Name: Linh Kamara  : 1980  MRN: [id number]  Date of Service: 10/30/2023    Discharge Recommendations:  Linh Kamara scored a 6/24 on the AM-PAC short mobility form. Current research shows that an AM-PAC score of 17 or less is typically not associated with a discharge to the patient's home setting. Based on the patient's AM-PAC score and their current functional mobility deficits, it is recommended that the patient have 5-7 sessions per week of Physical Therapy at d/c to increase the patient's independence. At this time, this patient demonstrates complex nursing, medical, and rehabilitative needs, and would benefit from intensive rehabilitation services upon discharge from the Inpatient setting. This patient demonstrates the ability to participate in and benefit from an intensive therapy program with a coordinated interdisciplinary team approach to foster frequent, structured, and documented communication among disciplines, who will work together to establish, prioritize, and achieve treatment goals. Please see assessment section for further patient specific details. If patient discharges prior to next session this note will serve as a discharge summary. Please see below for the latest assessment towards goals. DME - defer to next LOC      Patient Diagnosis(es): The primary encounter diagnosis was Acute ischemic left MCA stroke ([address]). Diagnoses of Acute cerebrovascular accident (CVA) ([address]) and Left middle cerebral artery stroke [facility]) were also pertinent to this visit. Past Medical History:  has no past medical history on file. Past Surgical History:  has a past surgical history that includes craniotomy (Left, 10/21/2023). Assessment   Assessment: No active motion noted right side,  right IR tone noted to quick stretch today. Pt required A x 2 for all bed mobility and sitting balance.   Recommend assist with scooting.)       Balance  Comments: Pt sat on eob for at least 15 minutes, assist from mod to min to maintain midline. Able to pull self to midline with left hand on foot board - also able to reposition to midline with min A. Able to lean on right elbow with mas A. AM-PAC Score  AM-PAC Inpatient Mobility Raw Score : 6 (10/30/23 1533)  AM-PAC Inpatient T-Scale Score : 23.55 (10/30/23 1533)  Mobility Inpatient CMS 0-100% Score: 100 (10/30/23 1533)  Mobility Inpatient CMS G-Code Modifier : CN (10/30/23 1533)            Goals  Short Term Goals  Time Frame for Short Term Goals: dc  ongoing 10/30  Short Term Goal 1: Supine<>sit max A of 1  Short Term Goal 2: Rolling side to side mod A  Short Term Goal 3: EOB sitting x 2 min CGA without LOB. Short Term Goal 4: Bridging ex in supine x 5 reps, max A for RLE support.   Patient Goals   Patient Goals : non-verbal & unable to state        Education - role of PT, bed mobility, sitting balance - pt aphasic uncertain of comprehension         Therapy Time   Individual Concurrent Group Co-treatment   Time In 0255         Time Out 0326         Minutes 31             Timed Code Treatment Minutes:   31    Total Treatment Minutes:  [address] [facility], [id number] No

## 2023-11-01 ENCOUNTER — APPOINTMENT (OUTPATIENT)
Dept: CT IMAGING | Facility: HOSPITAL | Age: 83
End: 2023-11-01
Payer: MEDICARE

## 2023-11-01 ENCOUNTER — OUTPATIENT (OUTPATIENT)
Dept: OUTPATIENT SERVICES | Facility: HOSPITAL | Age: 83
LOS: 1 days | End: 2023-11-01
Payer: MEDICARE

## 2023-11-01 DIAGNOSIS — Z90.2 ACQUIRED ABSENCE OF LUNG [PART OF]: Chronic | ICD-10-CM

## 2023-11-01 DIAGNOSIS — R93.89 ABNORMAL FINDINGS ON DIAGNOSTIC IMAGING OF OTHER SPECIFIED BODY STRUCTURES: ICD-10-CM

## 2023-11-01 PROCEDURE — 71250 CT THORAX DX C-: CPT | Mod: 26,MH

## 2023-11-01 PROCEDURE — 71250 CT THORAX DX C-: CPT

## 2023-11-02 ENCOUNTER — RESULT REVIEW (OUTPATIENT)
Age: 83
End: 2023-11-02

## 2023-11-02 ENCOUNTER — APPOINTMENT (OUTPATIENT)
Dept: MRI IMAGING | Facility: HOSPITAL | Age: 83
End: 2023-11-02
Payer: MEDICARE

## 2023-11-02 ENCOUNTER — OUTPATIENT (OUTPATIENT)
Dept: OUTPATIENT SERVICES | Facility: HOSPITAL | Age: 83
LOS: 1 days | End: 2023-11-02
Payer: MEDICARE

## 2023-11-02 DIAGNOSIS — Z90.2 ACQUIRED ABSENCE OF LUNG [PART OF]: Chronic | ICD-10-CM

## 2023-11-02 DIAGNOSIS — I63.9 CEREBRAL INFARCTION, UNSPECIFIED: ICD-10-CM

## 2023-11-02 PROCEDURE — 70551 MRI BRAIN STEM W/O DYE: CPT

## 2023-11-02 PROCEDURE — 70551 MRI BRAIN STEM W/O DYE: CPT | Mod: 26,MH

## 2023-11-06 ENCOUNTER — NON-APPOINTMENT (OUTPATIENT)
Age: 83
End: 2023-11-06

## 2023-11-09 ENCOUNTER — APPOINTMENT (OUTPATIENT)
Dept: ELECTROPHYSIOLOGY | Facility: CLINIC | Age: 83
End: 2023-11-09
Payer: MEDICARE

## 2023-11-09 ENCOUNTER — NON-APPOINTMENT (OUTPATIENT)
Age: 83
End: 2023-11-09

## 2023-11-10 ENCOUNTER — RESULT REVIEW (OUTPATIENT)
Age: 83
End: 2023-11-10

## 2023-11-10 ENCOUNTER — APPOINTMENT (OUTPATIENT)
Dept: HEMATOLOGY ONCOLOGY | Facility: CLINIC | Age: 83
End: 2023-11-10
Payer: MEDICARE

## 2023-11-10 ENCOUNTER — APPOINTMENT (OUTPATIENT)
Dept: INFUSION THERAPY | Facility: HOSPITAL | Age: 83
End: 2023-11-10

## 2023-11-10 VITALS
TEMPERATURE: 98 F | BODY MASS INDEX: 20.26 KG/M2 | WEIGHT: 117.95 LBS | DIASTOLIC BLOOD PRESSURE: 73 MMHG | HEART RATE: 74 BPM | RESPIRATION RATE: 16 BRPM | SYSTOLIC BLOOD PRESSURE: 145 MMHG | OXYGEN SATURATION: 99 %

## 2023-11-10 LAB
ALBUMIN SERPL ELPH-MCNC: 4.6 G/DL
ALP BLD-CCNC: 85 U/L
ALT SERPL-CCNC: 14 U/L
ANION GAP SERPL CALC-SCNC: 13 MMOL/L
AST SERPL-CCNC: 19 U/L
BASOPHILS # BLD AUTO: 0.06 K/UL — SIGNIFICANT CHANGE UP (ref 0–0.2)
BASOPHILS # BLD AUTO: 0.06 K/UL — SIGNIFICANT CHANGE UP (ref 0–0.2)
BASOPHILS NFR BLD AUTO: 0.5 % — SIGNIFICANT CHANGE UP (ref 0–2)
BASOPHILS NFR BLD AUTO: 0.5 % — SIGNIFICANT CHANGE UP (ref 0–2)
BILIRUB SERPL-MCNC: 0.3 MG/DL
BUN SERPL-MCNC: 26 MG/DL
CALCIUM SERPL-MCNC: 10.8 MG/DL
CHLORIDE SERPL-SCNC: 101 MMOL/L
CO2 SERPL-SCNC: 23 MMOL/L
CREAT SERPL-MCNC: 0.81 MG/DL
EGFR: 73 ML/MIN/1.73M2
EOSINOPHIL # BLD AUTO: 0.23 K/UL — SIGNIFICANT CHANGE UP (ref 0–0.5)
EOSINOPHIL # BLD AUTO: 0.23 K/UL — SIGNIFICANT CHANGE UP (ref 0–0.5)
EOSINOPHIL NFR BLD AUTO: 2 % — SIGNIFICANT CHANGE UP (ref 0–6)
EOSINOPHIL NFR BLD AUTO: 2 % — SIGNIFICANT CHANGE UP (ref 0–6)
FERRITIN SERPL-MCNC: 48 NG/ML
FOLATE SERPL-MCNC: 7.5 NG/ML
GLUCOSE SERPL-MCNC: 115 MG/DL
HCT VFR BLD CALC: 35.8 % — SIGNIFICANT CHANGE UP (ref 34.5–45)
HCT VFR BLD CALC: 35.8 % — SIGNIFICANT CHANGE UP (ref 34.5–45)
HGB BLD-MCNC: 12.1 G/DL — SIGNIFICANT CHANGE UP (ref 11.5–15.5)
HGB BLD-MCNC: 12.1 G/DL — SIGNIFICANT CHANGE UP (ref 11.5–15.5)
IMM GRANULOCYTES NFR BLD AUTO: 0.4 % — SIGNIFICANT CHANGE UP (ref 0–0.9)
IMM GRANULOCYTES NFR BLD AUTO: 0.4 % — SIGNIFICANT CHANGE UP (ref 0–0.9)
IRON SATN MFR SERPL: 20 %
IRON SERPL-MCNC: 74 UG/DL
LYMPHOCYTES # BLD AUTO: 26.7 % — SIGNIFICANT CHANGE UP (ref 13–44)
LYMPHOCYTES # BLD AUTO: 26.7 % — SIGNIFICANT CHANGE UP (ref 13–44)
LYMPHOCYTES # BLD AUTO: 3.14 K/UL — SIGNIFICANT CHANGE UP (ref 1–3.3)
LYMPHOCYTES # BLD AUTO: 3.14 K/UL — SIGNIFICANT CHANGE UP (ref 1–3.3)
MCHC RBC-ENTMCNC: 30.9 PG — SIGNIFICANT CHANGE UP (ref 27–34)
MCHC RBC-ENTMCNC: 30.9 PG — SIGNIFICANT CHANGE UP (ref 27–34)
MCHC RBC-ENTMCNC: 33.8 G/DL — SIGNIFICANT CHANGE UP (ref 32–36)
MCHC RBC-ENTMCNC: 33.8 G/DL — SIGNIFICANT CHANGE UP (ref 32–36)
MCV RBC AUTO: 91.3 FL — SIGNIFICANT CHANGE UP (ref 80–100)
MCV RBC AUTO: 91.3 FL — SIGNIFICANT CHANGE UP (ref 80–100)
MONOCYTES # BLD AUTO: 1.39 K/UL — HIGH (ref 0–0.9)
MONOCYTES # BLD AUTO: 1.39 K/UL — HIGH (ref 0–0.9)
MONOCYTES NFR BLD AUTO: 11.8 % — SIGNIFICANT CHANGE UP (ref 2–14)
MONOCYTES NFR BLD AUTO: 11.8 % — SIGNIFICANT CHANGE UP (ref 2–14)
NEUTROPHILS # BLD AUTO: 6.9 K/UL — SIGNIFICANT CHANGE UP (ref 1.8–7.4)
NEUTROPHILS # BLD AUTO: 6.9 K/UL — SIGNIFICANT CHANGE UP (ref 1.8–7.4)
NEUTROPHILS NFR BLD AUTO: 58.6 % — SIGNIFICANT CHANGE UP (ref 43–77)
NEUTROPHILS NFR BLD AUTO: 58.6 % — SIGNIFICANT CHANGE UP (ref 43–77)
NRBC # BLD: 0 /100 WBCS — SIGNIFICANT CHANGE UP (ref 0–0)
NRBC # BLD: 0 /100 WBCS — SIGNIFICANT CHANGE UP (ref 0–0)
PLATELET # BLD AUTO: 334 K/UL — SIGNIFICANT CHANGE UP (ref 150–400)
PLATELET # BLD AUTO: 334 K/UL — SIGNIFICANT CHANGE UP (ref 150–400)
POTASSIUM SERPL-SCNC: 4.6 MMOL/L
PROT SERPL-MCNC: 7.7 G/DL
RBC # BLD: 3.92 M/UL — SIGNIFICANT CHANGE UP (ref 3.8–5.2)
RBC # BLD: 3.92 M/UL — SIGNIFICANT CHANGE UP (ref 3.8–5.2)
RBC # FLD: 14.8 % — HIGH (ref 10.3–14.5)
RBC # FLD: 14.8 % — HIGH (ref 10.3–14.5)
SODIUM SERPL-SCNC: 137 MMOL/L
TIBC SERPL-MCNC: 365 UG/DL
UIBC SERPL-MCNC: 292 UG/DL
VIT B12 SERPL-MCNC: >2000 PG/ML
WBC # BLD: 11.77 K/UL — HIGH (ref 3.8–10.5)
WBC # BLD: 11.77 K/UL — HIGH (ref 3.8–10.5)
WBC # FLD AUTO: 11.77 K/UL — HIGH (ref 3.8–10.5)
WBC # FLD AUTO: 11.77 K/UL — HIGH (ref 3.8–10.5)

## 2023-11-10 PROCEDURE — 93298 REM INTERROG DEV EVAL SCRMS: CPT

## 2023-11-10 PROCEDURE — 99214 OFFICE O/P EST MOD 30 MIN: CPT

## 2023-11-10 PROCEDURE — G2066: CPT

## 2023-11-13 ENCOUNTER — NON-APPOINTMENT (OUTPATIENT)
Age: 83
End: 2023-11-13

## 2023-11-13 LAB
ALBUMIN MFR SERPL ELPH: 54.9 %
ALBUMIN SERPL-MCNC: 4.2 G/DL
ALBUMIN/GLOB SERPL: 1.2 RATIO
ALPHA1 GLOB MFR SERPL ELPH: 4.1 %
ALPHA1 GLOB SERPL ELPH-MCNC: 0.3 G/DL
ALPHA2 GLOB MFR SERPL ELPH: 10.1 %
ALPHA2 GLOB SERPL ELPH-MCNC: 0.8 G/DL
B-GLOBULIN MFR SERPL ELPH: 9.8 %
B-GLOBULIN SERPL ELPH-MCNC: 0.8 G/DL
DEPRECATED KAPPA LC FREE/LAMBDA SER: 101.81 RATIO
GAMMA GLOB FLD ELPH-MCNC: 1.6 G/DL
GAMMA GLOB MFR SERPL ELPH: 21.1 %
IGA SER QL IEP: 72 MG/DL
IGG SER QL IEP: 1336 MG/DL
IGM SER QL IEP: 52 MG/DL
INTERPRETATION SERPL IEP-IMP: NORMAL
KAPPA LC CSF-MCNC: 0.64 MG/DL
KAPPA LC SERPL-MCNC: 65.16 MG/DL
M PROTEIN MFR SERPL ELPH: 18.9 %
M PROTEIN SPEC IFE-MCNC: NORMAL
MONOCLON BAND OBS SERPL: 1.5 G/DL
PROT SERPL-MCNC: 7.7 G/DL
PROT SERPL-MCNC: 7.7 G/DL

## 2023-11-20 ENCOUNTER — APPOINTMENT (OUTPATIENT)
Dept: NEUROLOGY | Facility: CLINIC | Age: 83
End: 2023-11-20
Payer: MEDICARE

## 2023-11-20 VITALS
SYSTOLIC BLOOD PRESSURE: 117 MMHG | DIASTOLIC BLOOD PRESSURE: 70 MMHG | OXYGEN SATURATION: 97 % | HEIGHT: 63 IN | BODY MASS INDEX: 20.73 KG/M2 | HEART RATE: 89 BPM | TEMPERATURE: 97.8 F | WEIGHT: 117 LBS

## 2023-11-20 DIAGNOSIS — I63.9 CEREBRAL INFARCTION, UNSPECIFIED: ICD-10-CM

## 2023-11-20 PROCEDURE — 99214 OFFICE O/P EST MOD 30 MIN: CPT

## 2023-11-20 RX ORDER — ESCITALOPRAM OXALATE 20 MG/1
20 TABLET, FILM COATED ORAL DAILY
Refills: 0 | Status: COMPLETED | COMMUNITY
End: 2023-11-20

## 2023-11-20 RX ORDER — FLUTICASONE FUROATE, UMECLIDINIUM BROMIDE AND VILANTEROL TRIFENATATE 100; 62.5; 25 UG/1; UG/1; UG/1
100-62.5-25 POWDER RESPIRATORY (INHALATION)
Refills: 0 | Status: COMPLETED | COMMUNITY
Start: 2019-08-22 | End: 2023-11-20

## 2023-11-22 ENCOUNTER — OUTPATIENT (OUTPATIENT)
Dept: OUTPATIENT SERVICES | Facility: HOSPITAL | Age: 83
LOS: 1 days | Discharge: HOME | End: 2023-11-22

## 2023-11-22 DIAGNOSIS — E53.8 DEFICIENCY OF OTHER SPECIFIED B GROUP VITAMINS: ICD-10-CM

## 2023-11-22 DIAGNOSIS — Z90.2 ACQUIRED ABSENCE OF LUNG [PART OF]: Chronic | ICD-10-CM

## 2023-11-22 NOTE — H&P PST ADULT - CURRENT SWALLOWING
Healthy 12 y.o. male child.  Problem List Items Addressed This Visit       Familial hypercholesteremia - Primary     Fasting Lipid Profile.           Abnormal weight gain     Abnormal weight Gain -  Please monitor diet and eating habits.  Limit sugar sweetened beverages.  Encourage low calorie drinks.   Encourage Regular Exercise.  Will recheck next year.          Other Visit Diagnoses       Encounter for routine child health examination with abnormal findings        BMI (body mass index), pediatric, 85% to less than 95% for age            Shots today.  Discussed risks and benefits including components in immunizations.   Questions answered.  VIS given.   HPV today.  Second next year.   Declined Covid-19 and Flu vaccines today.      1. Anticipatory guidance discussed.  Gave handout on well-child issues at this age.  Safety topics reviewed.  2. No orders of the defined types were placed in this encounter.    3. Follow-up visit in 1 year for next well child visit, or sooner as needed.   
(0) swallows foods and liquids w/o difficulty

## 2023-11-27 NOTE — ASU PREOP CHECKLIST - AS TEMP SITE
Home care is fine but he was supposed to be follow-up with Dr. Theresa Medina in regards to [address] and treatment for his legs.   Home care could certainly call Dr. Theresa Medina to determine what he wants done with the legs as I have not seen this patient oral

## 2023-12-08 ENCOUNTER — APPOINTMENT (OUTPATIENT)
Dept: INFUSION THERAPY | Facility: HOSPITAL | Age: 83
End: 2023-12-08

## 2023-12-12 ENCOUNTER — APPOINTMENT (OUTPATIENT)
Dept: ELECTROPHYSIOLOGY | Facility: CLINIC | Age: 83
End: 2023-12-12
Payer: MEDICARE

## 2023-12-12 ENCOUNTER — NON-APPOINTMENT (OUTPATIENT)
Age: 83
End: 2023-12-12

## 2023-12-12 PROCEDURE — G2066: CPT

## 2023-12-12 PROCEDURE — 93298 REM INTERROG DEV EVAL SCRMS: CPT

## 2024-01-02 NOTE — ED ADULT NURSE NOTE - CAS EDN DISCHARGE ASSESSMENT
[facility] Anesthesia Pre-op History and Physical    Radha Wilson MRN# [id number]   Age: 47 year old YOB: 1976     Date of Exam 1/2/2024         Primary care provider: Katy Moore         Chief Complaint and/or Reason for Procedure:     CRC screening - first colonoscopy         Active problem list:     Patient Active Problem List    Diagnosis Date Noted    Major depressive disorder, recurrent episode, mild (H24) 03/03/2022     Priority: Medium    Hypertension goal BP (blood pressure) < 140/90 03/03/2022     Priority: Medium    Controlled type 2 diabetes mellitus without complication, without long-term current use of insulin (H) 02/09/2018     Priority: Medium    Myopia 01/06/2015     Priority: Medium    Advanced directives, counseling/discussion 03/18/2014     Priority: Medium     Advance Care Planning:   Receipt of ACP document:  Received: Health Care Directive which was witnessed or notarized on 12/4/14.  Document not previously scanned.  Validation form completed and sent with document to be scanned.  Code Status reflects choices in most recent ACP document.  Confirmed/documented designated decision maker(s). See permanent comments section of demographics in clinical tab. View document(s) and details by clicking on code status.   Added by Janice Briggs on 1/15/2015.  Advance Care Planning:   Initial facilitation introduction:   Radha Wilson presented for initial session regarding ACP at a group session. She was accompanied by no one. Honoring Choices information provided and resources reviewed. She currently wishes to give additional consideration to ACP prior to documenting choices.  She currently has the following questions or concerns about Advance Care Planning: none.  Confirmed/documented designated decision maker(s). See permanent comments section of demographics in clinical tab. Added by Gladys Collado on 3/18/2014    Health Care Directive received will have  scanned into chart 1/6/15  Negrita Webster CMA        Anxiety 01/21/2014     Priority: Medium    CARDIOVASCULAR SCREENING; LDL GOAL LESS THAN 160 10/31/2010     Priority: Medium            Medications (include herbals and vitamins):   Any Plavix use in the last 7 days? No     Current Outpatient Medications   Medication Sig    bisacodyl (DULCOLAX) 5 MG EC tablet Take 2 tablets at 3 pm the day before your procedure. If your procedure is before 11 am, take 2 additional tablets at 11 pm. If your procedure is after 11 am, take 2 additional tablets at 6 am. For additional instructions refer to your colonoscopy prep instructions.    buPROPion (WELLBUTRIN) 75 MG tablet TAKE 1 TABLET(75 MG) BY MOUTH AT BEDTIME Strength: 75 mg    IRON PO Take by mouth daily    levonorgestrel-ethinyl estradiol (QUASENSE) 0.15-0.03 MG tablet Skip week 4 of packs 1-3, then take week 4 of pack 4    losartan (COZAAR) 100 MG tablet Take 1 tablet (100 mg) by mouth daily    metFORMIN (GLUCOPHAGE XR) 500 MG 24 hr tablet Take 2 tablets (1,000 mg) by mouth 2 times daily (with meals)    Multiple Vitamins-Minerals (MULTIVITAMIN PO) Take 1 tablet by mouth daily    PARoxetine (PAXIL-CR) 25 MG 24 hr tablet Take 2 tablets (50 mg) by mouth daily    polyethylene glycol (GOLYTELY) 236 g suspension The night before the exam at 6 pm drink an 8-ounce glass every 15 minutes until the jug is half empty. If you arrive before 11 AM: Drink the other half of the Golytely jug at 11 PM night before procedure. If you arrive after 11 AM: Drink the other half of the Golytely jug at 6 AM day of procedure. For additional instructions refer to your colonoscopy prep instructions.    STATIN NOT PRESCRIBED (INTENTIONAL) Please choose reason not prescribed from choices below.    tirzepatide (MOUNJARO) 2.5 MG/0.5ML pen Inject 2.5 mg Subcutaneous every 7 days    valACYclovir (VALTREX) 1000 mg tablet      Current Facility-Administered Medications   Medication    lactated ringers  infusion    lidocaine (LMX4) kit    lidocaine 1 % 0.1-1 mL    ondansetron (ZOFRAN) injection 4 mg    sodium chloride (PF) 0.9% PF flush 3 mL    sodium chloride (PF) 0.9% PF flush 3 mL             Allergies:      Allergies   Allergen Reactions    Latex Unknown     Feels like she is going to choke     Allergy to Latex? No  Allergy to tape?   No  Intolerances:             Physical Exam:   All vitals have been reviewed  Patient Vitals for the past 8 hrs:   BP Temp Temp src Resp SpO2   01/02/24 0959 123/82 97.3  F (36.3  C) Temporal 16 98 %     No intake/output data recorded.  Lungs:   No increased work of breathing, good air exchange, clear to auscultation bilaterally, no crackles or wheezing     Cardiovascular:   normal S1 and S2             Lab / Radiology Results:            Anesthetic risk and/or ASA classification:   2    Temi Saleem,        Alert and oriented to person, place and time

## 2024-01-05 ENCOUNTER — APPOINTMENT (OUTPATIENT)
Dept: INFUSION THERAPY | Facility: HOSPITAL | Age: 84
End: 2024-01-05

## 2024-01-16 ENCOUNTER — NON-APPOINTMENT (OUTPATIENT)
Age: 84
End: 2024-01-16

## 2024-01-16 ENCOUNTER — APPOINTMENT (OUTPATIENT)
Dept: ELECTROPHYSIOLOGY | Facility: CLINIC | Age: 84
End: 2024-01-16
Payer: MEDICARE

## 2024-01-17 PROCEDURE — 93298 REM INTERROG DEV EVAL SCRMS: CPT

## 2024-01-24 ENCOUNTER — OUTPATIENT (OUTPATIENT)
Dept: OUTPATIENT SERVICES | Facility: HOSPITAL | Age: 84
LOS: 1 days | Discharge: HOME | End: 2024-01-24

## 2024-01-24 DIAGNOSIS — Z90.2 ACQUIRED ABSENCE OF LUNG [PART OF]: Chronic | ICD-10-CM

## 2024-01-24 DIAGNOSIS — E53.8 DEFICIENCY OF OTHER SPECIFIED B GROUP VITAMINS: ICD-10-CM

## 2024-02-02 ENCOUNTER — APPOINTMENT (OUTPATIENT)
Dept: HEMATOLOGY ONCOLOGY | Facility: CLINIC | Age: 84
End: 2024-02-02
Payer: MEDICARE

## 2024-02-02 ENCOUNTER — RESULT REVIEW (OUTPATIENT)
Age: 84
End: 2024-02-02

## 2024-02-02 ENCOUNTER — APPOINTMENT (OUTPATIENT)
Dept: INFUSION THERAPY | Facility: HOSPITAL | Age: 84
End: 2024-02-02

## 2024-02-02 ENCOUNTER — MED ADMIN CHARGE (OUTPATIENT)
Age: 84
End: 2024-02-02

## 2024-02-02 VITALS
HEART RATE: 94 BPM | WEIGHT: 121.23 LBS | TEMPERATURE: 97.2 F | DIASTOLIC BLOOD PRESSURE: 73 MMHG | SYSTOLIC BLOOD PRESSURE: 150 MMHG | BODY MASS INDEX: 21.48 KG/M2 | RESPIRATION RATE: 16 BRPM | OXYGEN SATURATION: 97 %

## 2024-02-02 LAB
BASOPHILS # BLD AUTO: 0.07 K/UL — SIGNIFICANT CHANGE UP (ref 0–0.2)
BASOPHILS NFR BLD AUTO: 0.6 % — SIGNIFICANT CHANGE UP (ref 0–2)
EOSINOPHIL # BLD AUTO: 0.18 K/UL — SIGNIFICANT CHANGE UP (ref 0–0.5)
EOSINOPHIL NFR BLD AUTO: 1.4 % — SIGNIFICANT CHANGE UP (ref 0–6)
HCT VFR BLD CALC: 31 % — LOW (ref 34.5–45)
HGB BLD-MCNC: 10.1 G/DL — LOW (ref 11.5–15.5)
IMM GRANULOCYTES NFR BLD AUTO: 0.4 % — SIGNIFICANT CHANGE UP (ref 0–0.9)
LYMPHOCYTES # BLD AUTO: 26.4 % — SIGNIFICANT CHANGE UP (ref 13–44)
LYMPHOCYTES # BLD AUTO: 3.31 K/UL — HIGH (ref 1–3.3)
MCHC RBC-ENTMCNC: 30.6 PG — SIGNIFICANT CHANGE UP (ref 27–34)
MCHC RBC-ENTMCNC: 32.6 G/DL — SIGNIFICANT CHANGE UP (ref 32–36)
MCV RBC AUTO: 93.9 FL — SIGNIFICANT CHANGE UP (ref 80–100)
MONOCYTES # BLD AUTO: 1.41 K/UL — HIGH (ref 0–0.9)
MONOCYTES NFR BLD AUTO: 11.3 % — SIGNIFICANT CHANGE UP (ref 2–14)
NEUTROPHILS # BLD AUTO: 7.51 K/UL — HIGH (ref 1.8–7.4)
NEUTROPHILS NFR BLD AUTO: 59.9 % — SIGNIFICANT CHANGE UP (ref 43–77)
NRBC # BLD: 0 /100 WBCS — SIGNIFICANT CHANGE UP (ref 0–0)
PLATELET # BLD AUTO: 323 K/UL — SIGNIFICANT CHANGE UP (ref 150–400)
RBC # BLD: 3.3 M/UL — LOW (ref 3.8–5.2)
RBC # FLD: 13.3 % — SIGNIFICANT CHANGE UP (ref 10.3–14.5)
WBC # BLD: 12.53 K/UL — HIGH (ref 3.8–10.5)
WBC # FLD AUTO: 12.53 K/UL — HIGH (ref 3.8–10.5)

## 2024-02-02 PROCEDURE — 96372 THER/PROPH/DIAG INJ SC/IM: CPT

## 2024-02-02 PROCEDURE — 99213 OFFICE O/P EST LOW 20 MIN: CPT | Mod: 25

## 2024-02-02 PROCEDURE — 99213 OFFICE O/P EST LOW 20 MIN: CPT

## 2024-02-02 RX ORDER — CYANOCOBALAMIN 1000 UG/ML
1000 INJECTION INTRAMUSCULAR; SUBCUTANEOUS
Qty: 0 | Refills: 0 | Status: COMPLETED | OUTPATIENT
Start: 2024-02-01

## 2024-02-02 NOTE — HISTORY OF PRESENT ILLNESS
[de-identified] : Ms. 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 \par  She wasn noted to have monoclonal gammopathy and bone marrow with 20% plasma cells with no evidence of bone lesions on either PET/CT in 8/2019 or MRI in 2/2022, no evidence of renal insufficiency or hypercalcemia. She has been seen by Dr. Gopi Norris at [facility] with recommendation to have continued clinical monitoring.\par  \par  In Summer 2019 she underwent Left upper lobe lung resection for Stage 1A (1.6cm with no LN involvement). She has been followed by Dr. Jean Baptiste with serial CT scans.\par  Most recent CT 7/2022 showed 0.9cm RUL nodule which had enlarged compared to prior CT with recommendation for PET/CT and possible surgical intervention. However the patient and her daughter opted to wait to discuss this with me in 9/2022 prior to proceeding.\par  \par  Transferred care from [facility] to  [facility] in   9/2022  [de-identified] : 2/2/2024 Patient presents today for followup of B12 deficiency, smoldering myeloma and adenoca of lung. Also noted to have iron deficiency at her last visit and is taking Slow-FE bid dosing.  She is tolerating well.  No constipation or abdominal discomfort. ; Feels that her energy is improved She has not seen her gastro for possible GI workup;  She does note occasional diarrhea which occurs without clear precipitating cause.   continues to receive B12 injections every 4-6 weeks without incident at [facility].  Her last immunoelectrophoresis showed rising Kappa Lambda FLC Ratio 11/2023 101.81;    She was scheduled for VATs procedure to remove right upper lobe hypermetabolic lung nodule.  She subsequently decided not to proceed with surgery. Her PET/CT October 2022 did not reveal any metastatic disease.  She had serial CT Chest 2/2023, 7/2023 and 11/1/23 with showed stability of lung mass. - She has not followed up with Dr. Brooks - was. following with Pulmonary (Dr. Flores) but has opted to stop as well; inhalers prescribed to her but she is unable to afford them due to high cost - She is minimally symptomatic with intermittent ocugh  She denies any fever, chills, bone pain, abdominal pain or appetite loss. [100: Normal, no complaints, no evidence of disease.] : 100: Normal, no complaints, no evidence of disease. [ECOG Performance Status: 0 - Fully active, able to carry on all pre-disease performance without restriction] : Performance Status: 0 - Fully active, able to carry on all pre-disease performance without restriction

## 2024-02-02 NOTE — ASSESSMENT
[FreeTextEntry1] : 82 yo woman with multifactorial anemia due to B12 deficiency anemia and iron deficiency, Stage 1 Adenocarcinoma of the lung in 2019 and smoldering myeloma  B12 deficiency - Hgb 10.1 g/dl today - resume B12 1000mcg IM every 12 weeks- injection today - will monitor B12 levels periodically  Iron Deficiency Anemia - tolerating Slow-FE orally bid -  - will repeat  iron studies today, to continue oral supplements; w - discussed GI workup again - patient is reluctant to undergo any further testing.  Smoldering myeloma - reviewed path reports from BM Bx done in 4/2015 and 12/2019 - was noted to have 20% plasma cell population with FISH panel showing loss of 14q and loss of 17p; despite 17p deletion being associated with unfavorable prognosis, patient was recommended by Dr. Gopi Norris to continue observation - K/L ratio is at this point >100 which is highly concerning for progression of myeloma - bone marrow biopsy recommended today and to be scheduled - will continue to monitor for hypercalcemia, renal insufficiency and anemia - continue to monitor immunoglobulin levels and M-spike at least biannually  Lung Adenocarcinoma - Patient wishes to continue with monitoring and does not wish to proceed with any further surgery. -She will followup with pulmonary to medical management of any respiratory symptoms. - last PET imaging in 10/2022; will consider annually more-so to monitor for early bone abnormalities associated with plasma cell disorder evolution into myeloma from smoldering disease - encouraged  follow up with Dr. Flores (pulmonary) - reviewed CT chest done 11/1/23; per patient's advanced age and comorbid conditions, will hold off on surgical intervention at this time and plan for repeat imaging at 3-6 months (april 2024)  Patient had the opportunity to have all their questions answered to their satisfaction  F/up 3 month or sooner pending results of biopsy

## 2024-02-02 NOTE — REASON FOR VISIT
[Follow-Up Visit] : a follow-up [Other: _____] : [unfilled] [FreeTextEntry2] : B12 deficiency/MM/lung cancer

## 2024-02-02 NOTE — END OF VISIT
[FreeTextEntry3] : Patient being seen as per physician's primary plan of care. Dr. Hancock was present for this visit and advised on primary plan of care for this patient.

## 2024-02-02 NOTE — PHYSICAL EXAM
[Restricted in physically strenuous activity but ambulatory and able to carry out work of a light or sedentary nature] : Status 1- Restricted in physically strenuous activity but ambulatory and able to carry out work of a light or sedentary nature, e.g., light house work, office work [Normal] : affect appropriate [de-identified] : well healed VATS scar on left from SERA resection;

## 2024-02-05 LAB
ALBUMIN SERPL ELPH-MCNC: 4.4 G/DL
ALP BLD-CCNC: 74 U/L
ALT SERPL-CCNC: 14 U/L
ANION GAP SERPL CALC-SCNC: 12 MMOL/L
AST SERPL-CCNC: 18 U/L
BILIRUB SERPL-MCNC: 0.3 MG/DL
BUN SERPL-MCNC: 26 MG/DL
CALCIUM SERPL-MCNC: 10 MG/DL
CHLORIDE SERPL-SCNC: 103 MMOL/L
CO2 SERPL-SCNC: 23 MMOL/L
CREAT SERPL-MCNC: 0.81 MG/DL
EGFR: 73 ML/MIN/1.73M2
FERRITIN SERPL-MCNC: 22 NG/ML
FOLATE SERPL-MCNC: 6.1 NG/ML
GLUCOSE SERPL-MCNC: 99 MG/DL
POTASSIUM SERPL-SCNC: 4.4 MMOL/L
PROT SERPL-MCNC: 7.5 G/DL
SODIUM SERPL-SCNC: 138 MMOL/L
VIT B12 SERPL-MCNC: >2000 PG/ML

## 2024-02-07 LAB
ALBUMIN MFR SERPL ELPH: 55.6 %
ALBUMIN SERPL-MCNC: 4.1 G/DL
ALBUMIN/GLOB SERPL: 1.2 RATIO
ALPHA1 GLOB MFR SERPL ELPH: 4 %
ALPHA1 GLOB SERPL ELPH-MCNC: 0.3 G/DL
ALPHA2 GLOB MFR SERPL ELPH: 9.6 %
ALPHA2 GLOB SERPL ELPH-MCNC: 0.7 G/DL
B-GLOBULIN MFR SERPL ELPH: 9.5 %
B-GLOBULIN SERPL ELPH-MCNC: 0.7 G/DL
DEPRECATED KAPPA LC FREE/LAMBDA SER: 88.69 RATIO
GAMMA GLOB FLD ELPH-MCNC: 1.6 G/DL
GAMMA GLOB MFR SERPL ELPH: 21.3 %
IGA SER QL IEP: 63 MG/DL
IGG SER QL IEP: 1477 MG/DL
IGM SER QL IEP: 51 MG/DL
INTERPRETATION SERPL IEP-IMP: NORMAL
IRON SATN MFR SERPL: 13 %
IRON SERPL-MCNC: 47 UG/DL
KAPPA LC CSF-MCNC: 0.65 MG/DL
KAPPA LC SERPL-MCNC: 57.65 MG/DL
M PROTEIN MFR SERPL ELPH: 18.9 %
M PROTEIN SPEC IFE-MCNC: NORMAL
MONOCLON BAND OBS SERPL: 1.4 G/DL
PROT SERPL-MCNC: 7.4 G/DL
PROT SERPL-MCNC: 7.4 G/DL
TIBC SERPL-MCNC: 371 UG/DL
UIBC SERPL-MCNC: 323 UG/DL

## 2024-02-08 RX ORDER — ALPRAZOLAM 0.25 MG/1
0.25 TABLET ORAL
Qty: 5 | Refills: 0 | Status: ACTIVE | COMMUNITY
Start: 2024-02-08 | End: 1900-01-01

## 2024-02-12 NOTE — ED ADULT TRIAGE NOTE - MEANS OF ARRIVAL
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]    Procedure(s):  BIOPSY LEEP CERVIX    Specimen(s):  ID Type Source Tests Collected by Time Destination   1 : ecc Tissue Cervix, Endocervical TISSUE EXAM Nikkie Rocha MD 2024  9:47 AM    2 : cone biopsy, marked at 3 Tissue Cervix, Endocervical TISSUE EXAM Nikkie Rocha MD 2024  9:50 AM        Estimated Blood Loss:   Minimal    Drains:  * No LDAs found *    Anesthesia Type:   Choice    Operative Indications:  Dysplasia of cervix, 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 Informed consent was obtained.     Description of Procedure    Patient was taken to the operating room were a time out was performed to confirm correct patient and correct procedure. Monitored anesthesia care (MAC) with local  was administered and the patient was positioned on the OR table in the dorsal lithotomy position. All pressure points were padded and a mian hugger was placed to maintain control of core body temperature. A bimanual exam was performed and the uterus was noted to be anteverted, . The patient was prepped and draped in the usual sterile fashion.      Operative Technique     A bivalve coated speculum was inserted into there vagina and the cervix was visualized. ( Local anesthesia % lidocaine/epi 9  cc was injected ML at the 2/4/8/10 O'clock position).  Colposcopy was performed and Lugol's iodine was applied to the cervix with non staining areas noted being noted at around the os most of the part .  The small wide herring tip used   Setting was 50/50  0 Vicryl suture was used at 12:00 to help with retraction following the cone biopsy of the cervix was done entry at 3:00 specimen marked at 3:00 and sent to pathology following that ECC was performed and specimen sent to pathology then ball-tipped cautery was used to obtain hemostasis good hemostasis was noticed 0 Vicryl sutures was used in a running locked fashion around the bed of the biopsy followed by Monistat then Surgicel dipped with Monistat was used at the bed of the cone to confirm hemostasis  Good hemostasis was noted patient tolerated procedure well sponge lap needle instrument counts were correct patient transferred to the recovery room in stable condition       I was present for the entire procedure.    Patient Disposition:  PACU         SIGNATURE: Nikkie Rocha MD  DATE: February 12, 2024  TIME: 10:17 AM                 ambulatory

## 2024-02-16 ENCOUNTER — LABORATORY RESULT (OUTPATIENT)
Age: 84
End: 2024-02-16

## 2024-02-16 ENCOUNTER — RESULT REVIEW (OUTPATIENT)
Age: 84
End: 2024-02-16

## 2024-02-16 ENCOUNTER — APPOINTMENT (OUTPATIENT)
Dept: HEMATOLOGY ONCOLOGY | Facility: CLINIC | Age: 84
End: 2024-02-16
Payer: MEDICARE

## 2024-02-16 VITALS
TEMPERATURE: 98.4 F | BODY MASS INDEX: 21.13 KG/M2 | SYSTOLIC BLOOD PRESSURE: 108 MMHG | HEART RATE: 98 BPM | RESPIRATION RATE: 16 BRPM | OXYGEN SATURATION: 95 % | WEIGHT: 119.27 LBS | DIASTOLIC BLOOD PRESSURE: 70 MMHG

## 2024-02-16 LAB
BASOPHILS # BLD AUTO: 0.08 K/UL — SIGNIFICANT CHANGE UP (ref 0–0.2)
BASOPHILS NFR BLD AUTO: 0.7 % — SIGNIFICANT CHANGE UP (ref 0–2)
EOSINOPHIL # BLD AUTO: 0.13 K/UL — SIGNIFICANT CHANGE UP (ref 0–0.5)
EOSINOPHIL NFR BLD AUTO: 1.1 % — SIGNIFICANT CHANGE UP (ref 0–6)
HCT VFR BLD CALC: 29.5 % — LOW (ref 34.5–45)
HGB BLD-MCNC: 9.8 G/DL — LOW (ref 11.5–15.5)
IMM GRANULOCYTES NFR BLD AUTO: 0.3 % — SIGNIFICANT CHANGE UP (ref 0–0.9)
LYMPHOCYTES # BLD AUTO: 29.6 % — SIGNIFICANT CHANGE UP (ref 13–44)
LYMPHOCYTES # BLD AUTO: 3.39 K/UL — HIGH (ref 1–3.3)
MCHC RBC-ENTMCNC: 30.5 PG — SIGNIFICANT CHANGE UP (ref 27–34)
MCHC RBC-ENTMCNC: 33.2 G/DL — SIGNIFICANT CHANGE UP (ref 32–36)
MCV RBC AUTO: 91.9 FL — SIGNIFICANT CHANGE UP (ref 80–100)
MONOCYTES # BLD AUTO: 1.45 K/UL — HIGH (ref 0–0.9)
MONOCYTES NFR BLD AUTO: 12.7 % — SIGNIFICANT CHANGE UP (ref 2–14)
NEUTROPHILS # BLD AUTO: 6.37 K/UL — SIGNIFICANT CHANGE UP (ref 1.8–7.4)
NEUTROPHILS NFR BLD AUTO: 55.6 % — SIGNIFICANT CHANGE UP (ref 43–77)
NRBC # BLD: 0 /100 WBCS — SIGNIFICANT CHANGE UP (ref 0–0)
PLATELET # BLD AUTO: 350 K/UL — SIGNIFICANT CHANGE UP (ref 150–400)
RBC # BLD: 3.21 M/UL — LOW (ref 3.8–5.2)
RBC # FLD: 14.3 % — SIGNIFICANT CHANGE UP (ref 10.3–14.5)
WBC # BLD: 11.45 K/UL — HIGH (ref 3.8–10.5)
WBC # FLD AUTO: 11.45 K/UL — HIGH (ref 3.8–10.5)

## 2024-02-16 PROCEDURE — 38222 DX BONE MARROW BX & ASPIR: CPT | Mod: RT

## 2024-02-16 NOTE — PROCEDURE
[Bone Marrow Biopsy] : bone marrow biopsy [Bone Marrow Aspiration] : bone marrow aspiration  [Patient] : the patient [Verbal Consent Obtained] : verbal consent was obtained prior to the procedure [Patient identification verified] : patient identification verified [Procedure verified and consent obtained] : procedure verified and consent obtained [Laterality verified and correct site marked] : laterality verified and correct site marked [Right] : site: right [Correct positioning] : correct positioning [Prone] : prone [Superior iliac spine was identified] : the superior iliac spine was identified. [The right posterior iliac crest was prepped with betadine and draped, using sterile technique.] : The right posterior iliac crest was prepped with betadine and draped, using sterile technique. [Lidocaine was injected and into the periosteum overlying the site.] : Lidocaine was injected and into the periosteum overlying the site. [Aspirate] : aspirate [Cytogenetics] : cytogenetics [FISH] : FISH [Biopsy] : biopsy [Flow Cytometry] : flow cytometry [] : The patient was instructed to remove the bandage the following AM. The patient may bathe. Acetaminophen may be taken for discomfort, as per package directions.If there are any other problems, the patient was instructed to call the office. The patient verbalized understanding, and is aware of the office contact numbers. 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 R/o MM [FreeTextEntry2] : 10 cc of 1% Lidocaine was used for the procedure   WBC: 11.45K/ul Hgb: 9.8 g/dL Hct: 29.5 % Plts: 350 K/uL   Bone marrow aspiration and biopsy were done. MM panel requested. Pressure applied > 10 mins - no S&S of bleeding noted.

## 2024-02-16 NOTE — REASON FOR VISIT
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 R/o MM

## 2024-02-20 ENCOUNTER — APPOINTMENT (OUTPATIENT)
Dept: ELECTROPHYSIOLOGY | Facility: CLINIC | Age: 84
End: 2024-02-20
Payer: MEDICARE

## 2024-02-20 ENCOUNTER — NON-APPOINTMENT (OUTPATIENT)
Age: 84
End: 2024-02-20

## 2024-02-20 PROCEDURE — 93298 REM INTERROG DEV EVAL SCRMS: CPT

## 2024-03-04 ENCOUNTER — APPOINTMENT (OUTPATIENT)
Dept: INFUSION THERAPY | Facility: HOSPITAL | Age: 84
End: 2024-03-04

## 2024-03-05 DIAGNOSIS — D50.9 IRON DEFICIENCY ANEMIA, UNSPECIFIED: ICD-10-CM

## 2024-03-11 ENCOUNTER — APPOINTMENT (OUTPATIENT)
Dept: INFUSION THERAPY | Facility: HOSPITAL | Age: 84
End: 2024-03-11

## 2024-03-25 ENCOUNTER — NON-APPOINTMENT (OUTPATIENT)
Age: 84
End: 2024-03-25

## 2024-03-25 ENCOUNTER — APPOINTMENT (OUTPATIENT)
Dept: ELECTROPHYSIOLOGY | Facility: CLINIC | Age: 84
End: 2024-03-25
Payer: MEDICARE

## 2024-03-25 PROCEDURE — 93298 REM INTERROG DEV EVAL SCRMS: CPT

## 2024-04-19 ENCOUNTER — OUTPATIENT (OUTPATIENT)
Dept: OUTPATIENT SERVICES | Facility: HOSPITAL | Age: 84
LOS: 1 days | Discharge: HOME | End: 2024-04-19

## 2024-04-19 DIAGNOSIS — E53.8 DEFICIENCY OF OTHER SPECIFIED B GROUP VITAMINS: ICD-10-CM

## 2024-04-19 DIAGNOSIS — Z90.2 ACQUIRED ABSENCE OF LUNG [PART OF]: Chronic | ICD-10-CM

## 2024-04-25 ENCOUNTER — NON-APPOINTMENT (OUTPATIENT)
Age: 84
End: 2024-04-25

## 2024-04-26 ENCOUNTER — RESULT REVIEW (OUTPATIENT)
Age: 84
End: 2024-04-26

## 2024-04-26 ENCOUNTER — APPOINTMENT (OUTPATIENT)
Dept: HEMATOLOGY ONCOLOGY | Facility: CLINIC | Age: 84
End: 2024-04-26
Payer: MEDICARE

## 2024-04-26 ENCOUNTER — MED ADMIN CHARGE (OUTPATIENT)
Age: 84
End: 2024-04-26

## 2024-04-26 VITALS
HEART RATE: 98 BPM | SYSTOLIC BLOOD PRESSURE: 143 MMHG | RESPIRATION RATE: 16 BRPM | TEMPERATURE: 97.3 F | DIASTOLIC BLOOD PRESSURE: 79 MMHG | WEIGHT: 122.33 LBS | BODY MASS INDEX: 21.67 KG/M2 | OXYGEN SATURATION: 96 %

## 2024-04-26 DIAGNOSIS — R93.89 ABNORMAL FINDINGS ON DIAGNOSTIC IMAGING OF OTHER SPECIFIED BODY STRUCTURES: ICD-10-CM

## 2024-04-26 DIAGNOSIS — D47.2 MONOCLONAL GAMMOPATHY: ICD-10-CM

## 2024-04-26 DIAGNOSIS — D50.8 OTHER IRON DEFICIENCY ANEMIAS: ICD-10-CM

## 2024-04-26 DIAGNOSIS — C34.90 MALIGNANT NEOPLASM OF UNSPECIFIED PART OF UNSPECIFIED BRONCHUS OR LUNG: ICD-10-CM

## 2024-04-26 DIAGNOSIS — D51.8 OTHER VITAMIN B12 DEFICIENCY ANEMIAS: ICD-10-CM

## 2024-04-26 LAB
BASOPHILS # BLD AUTO: 0.07 K/UL — SIGNIFICANT CHANGE UP (ref 0–0.2)
BASOPHILS NFR BLD AUTO: 0.7 % — SIGNIFICANT CHANGE UP (ref 0–2)
EOSINOPHIL # BLD AUTO: 0.25 K/UL — SIGNIFICANT CHANGE UP (ref 0–0.5)
EOSINOPHIL NFR BLD AUTO: 2.5 % — SIGNIFICANT CHANGE UP (ref 0–6)
HCT VFR BLD CALC: 33.7 % — LOW (ref 34.5–45)
HGB BLD-MCNC: 11.2 G/DL — LOW (ref 11.5–15.5)
IMM GRANULOCYTES NFR BLD AUTO: 0.3 % — SIGNIFICANT CHANGE UP (ref 0–0.9)
LYMPHOCYTES # BLD AUTO: 2.62 K/UL — SIGNIFICANT CHANGE UP (ref 1–3.3)
LYMPHOCYTES # BLD AUTO: 25.9 % — SIGNIFICANT CHANGE UP (ref 13–44)
MCHC RBC-ENTMCNC: 31.7 PG — SIGNIFICANT CHANGE UP (ref 27–34)
MCHC RBC-ENTMCNC: 33.2 G/DL — SIGNIFICANT CHANGE UP (ref 32–36)
MCV RBC AUTO: 95.5 FL — SIGNIFICANT CHANGE UP (ref 80–100)
MONOCYTES # BLD AUTO: 1.02 K/UL — HIGH (ref 0–0.9)
MONOCYTES NFR BLD AUTO: 10.1 % — SIGNIFICANT CHANGE UP (ref 2–14)
NEUTROPHILS # BLD AUTO: 6.12 K/UL — SIGNIFICANT CHANGE UP (ref 1.8–7.4)
NEUTROPHILS NFR BLD AUTO: 60.5 % — SIGNIFICANT CHANGE UP (ref 43–77)
NRBC # BLD: 0 /100 WBCS — SIGNIFICANT CHANGE UP (ref 0–0)
PLATELET # BLD AUTO: 292 K/UL — SIGNIFICANT CHANGE UP (ref 150–400)
RBC # BLD: 3.53 M/UL — LOW (ref 3.8–5.2)
RBC # FLD: 15.1 % — HIGH (ref 10.3–14.5)
WBC # BLD: 10.11 K/UL — SIGNIFICANT CHANGE UP (ref 3.8–10.5)
WBC # FLD AUTO: 10.11 K/UL — SIGNIFICANT CHANGE UP (ref 3.8–10.5)

## 2024-04-26 PROCEDURE — 99214 OFFICE O/P EST MOD 30 MIN: CPT

## 2024-04-26 PROCEDURE — 99442: CPT | Mod: 93

## 2024-04-26 PROCEDURE — G2211 COMPLEX E/M VISIT ADD ON: CPT

## 2024-04-26 RX ORDER — CYANOCOBALAMIN 1000 UG/ML
1000 INJECTION INTRAMUSCULAR; SUBCUTANEOUS
Qty: 0 | Refills: 0 | Status: COMPLETED | OUTPATIENT
Start: 2024-04-26

## 2024-04-26 RX ADMIN — CYANOCOBALAMIN 0 MCG/ML: 1000 INJECTION INTRAMUSCULAR; SUBCUTANEOUS at 00:00

## 2024-04-29 ENCOUNTER — NON-APPOINTMENT (OUTPATIENT)
Age: 84
End: 2024-04-29

## 2024-04-29 ENCOUNTER — APPOINTMENT (OUTPATIENT)
Dept: ELECTROPHYSIOLOGY | Facility: CLINIC | Age: 84
End: 2024-04-29
Payer: MEDICARE

## 2024-04-29 LAB
ALBUMIN SERPL ELPH-MCNC: 4.4 G/DL
ALP BLD-CCNC: 74 U/L
ALT SERPL-CCNC: 12 U/L
ANION GAP SERPL CALC-SCNC: 12 MMOL/L
AST SERPL-CCNC: 18 U/L
BILIRUB SERPL-MCNC: 0.3 MG/DL
BUN SERPL-MCNC: 21 MG/DL
CALCIUM SERPL-MCNC: 10 MG/DL
CHLORIDE SERPL-SCNC: 106 MMOL/L
CO2 SERPL-SCNC: 23 MMOL/L
CREAT SERPL-MCNC: 0.74 MG/DL
EGFR: 81 ML/MIN/1.73M2
FERRITIN SERPL-MCNC: 215 NG/ML
FOLATE SERPL-MCNC: 5.9 NG/ML
GLUCOSE SERPL-MCNC: 106 MG/DL
IRON SATN MFR SERPL: 29 %
IRON SERPL-MCNC: 69 UG/DL
POTASSIUM SERPL-SCNC: 4.1 MMOL/L
PROT SERPL-MCNC: 7.1 G/DL
SODIUM SERPL-SCNC: 141 MMOL/L
TIBC SERPL-MCNC: 240 UG/DL
UIBC SERPL-MCNC: 171 UG/DL
VIT B12 SERPL-MCNC: 900 PG/ML

## 2024-04-29 PROCEDURE — 93298 REM INTERROG DEV EVAL SCRMS: CPT

## 2024-04-30 ENCOUNTER — APPOINTMENT (OUTPATIENT)
Dept: CT IMAGING | Facility: HOSPITAL | Age: 84
End: 2024-04-30

## 2024-04-30 ENCOUNTER — OUTPATIENT (OUTPATIENT)
Dept: OUTPATIENT SERVICES | Facility: HOSPITAL | Age: 84
LOS: 1 days | End: 2024-04-30
Payer: MEDICARE

## 2024-04-30 DIAGNOSIS — Z90.2 ACQUIRED ABSENCE OF LUNG [PART OF]: Chronic | ICD-10-CM

## 2024-04-30 DIAGNOSIS — R93.89 ABNORMAL FINDINGS ON DIAGNOSTIC IMAGING OF OTHER SPECIFIED BODY STRUCTURES: ICD-10-CM

## 2024-04-30 DIAGNOSIS — C34.90 MALIGNANT NEOPLASM OF UNSPECIFIED PART OF UNSPECIFIED BRONCHUS OR LUNG: ICD-10-CM

## 2024-04-30 PROBLEM — D51.8 ANEMIA OF DECREASED VITAMIN B12 ABSORPTION: Status: ACTIVE | Noted: 2022-09-26

## 2024-04-30 PROBLEM — D50.8 OTHER IRON DEFICIENCY ANEMIA: Status: ACTIVE | Noted: 2023-08-14

## 2024-04-30 PROBLEM — D47.2 SMOLDERING MULTIPLE MYELOMA (SMM): Status: ACTIVE | Noted: 2022-09-26

## 2024-04-30 PROCEDURE — 71250 CT THORAX DX C-: CPT | Mod: 26,MH

## 2024-04-30 PROCEDURE — 71250 CT THORAX DX C-: CPT

## 2024-04-30 NOTE — HISTORY OF PRESENT ILLNESS
[100: Normal, no complaints, no evidence of disease.] : 100: Normal, no complaints, no evidence of disease. [ECOG Performance Status: 0 - Fully active, able to carry on all pre-disease performance without restriction] : Performance Status: 0 - Fully active, able to carry on all pre-disease performance without restriction [de-identified] : Ms. 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 \par  She wasn noted to have monoclonal gammopathy and bone marrow with 20% plasma cells with no evidence of bone lesions on either PET/CT in 8/2019 or MRI in 2/2022, no evidence of renal insufficiency or hypercalcemia. She has been seen by Dr. Gopi Norris at [facility] with recommendation to have continued clinical monitoring.\par  \par  In Summer 2019 she underwent Left upper lobe lung resection for Stage 1A (1.6cm with no LN involvement). She has been followed by Dr. Jean Baptiste with serial CT scans.\par  Most recent CT 7/2022 showed 0.9cm RUL nodule which had enlarged compared to prior CT with recommendation for PET/CT and possible surgical intervention. However the patient and her daughter opted to wait to discuss this with me in 9/2022 prior to proceeding.\par  \par  Transferred care from [facility]) to  [facility] (formerly [facility] [place]) in   9/2022  [de-identified] : 4/26/2024 Patient presents today for followup of B12 deficiency, smoldering myeloma and adenoca of lung. Also noted to have iron deficiency at her last visi; s/p two doses of Feraheme and now continues on Slow-FE bid dosing.  She is tolerating well.  No constipation or abdominal discomfort. ; Feels that her energy is improved She has not seen her gastro for possible GI workup;  She does note occasional diarrhea which occurs without clear precipitating cause.   continues to receive B12 injections every 4-6 weeks without incident at [facility].   Bone Marrow Biopsy 2/16/2024 - 12% plasma cells (down from 20%)   She was scheduled for VATs procedure to remove right upper lobe hypermetabolic lung nodule.  She subsequently decided not to proceed with surgery. Her PET/CT October 2022 did not reveal any metastatic disease.  She had serial CT Chest 2/2023, 7/2023 and 11/1/23 with showed stability of lung mass. - She has not followed up with Dr. Brooks - was. following with Pulmonary (Dr. Flores) but has opted to stop as well; inhalers prescribed to her but she is unable to afford them due to high cost - She is minimally symptomatic with intermittent ocugh  She denies any fever, chills, bone pain, abdominal pain or appetite loss.

## 2024-04-30 NOTE — PHYSICAL EXAM
[Restricted in physically strenuous activity but ambulatory and able to carry out work of a light or sedentary nature] : Status 1- Restricted in physically strenuous activity but ambulatory and able to carry out work of a light or sedentary nature, e.g., light house work, office work [Normal] : affect appropriate [de-identified] : well healed VATS scar on left from SERA resection;

## 2024-04-30 NOTE — ASSESSMENT
[FreeTextEntry1] : 84 yo woman with multifactorial anemia due to B12 deficiency anemia and iron deficiency, Stage 1 Adenocarcinoma of the lung in 2019 and smoldering myeloma  B12 deficiency - Hgb 10.1 g/dl today - resume B12 1000mcg IM every 12 weeks- injection today - will monitor B12 levels periodically  Iron Deficiency Anemia - s/p Feraheme x 2  - tolerating Slow-FE orally bid  continues***  - will repeat  iron studies today, to continue oral supplements; w - discussed GI workup again - patient is reluctant to undergo any further testing.  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 Gopi Norris to continue observation;  most recent BM Bx-- 12% plasma cells (decreased c/w 2019) - now noted to be 12% on recent bone marrow March 2024.  - K/L ratio decreased now to 89 from a high of 101.  - bone marrow biopsy recommended today and to be scheduled - will continue to monitor for hypercalcemia, renal insufficiency and anemia - continue to monitor immunoglobulin levels and M-spike at least biannually  Lung Adenocarcinoma - Patient wishes to continue with monitoring and does not wish to proceed with any further surgery. -She will followup with pulmonary to medical management of any respiratory symptoms. - last PET imaging in 10/2022; will consider annually more-so to monitor for early bone abnormalities associated with plasma cell disorder evolution into myeloma from smoldering disease - encouraged  follow up with Dr. Flores (pulmonary) - reviewed CT chest done 11/1/23; per patient's advanced age and comorbid conditions, will hold off on surgical intervention at this time and plan for repeat imaging at 3-6 months (april 2024)  Patient had the opportunity to have all their questions answered to their satisfaction  F/up 3 months

## 2024-05-01 LAB
ALBUMIN MFR SERPL ELPH: 56.2 %
ALBUMIN SERPL-MCNC: 4 G/DL
ALBUMIN/GLOB SERPL: 1.3 RATIO
ALPHA1 GLOB MFR SERPL ELPH: 4.1 %
ALPHA1 GLOB SERPL ELPH-MCNC: 0.3 G/DL
ALPHA2 GLOB MFR SERPL ELPH: 9.7 %
ALPHA2 GLOB SERPL ELPH-MCNC: 0.7 G/DL
B-GLOBULIN MFR SERPL ELPH: 8.7 %
B-GLOBULIN SERPL ELPH-MCNC: 0.6 G/DL
DEPRECATED KAPPA LC FREE/LAMBDA SER: 89.36 RATIO
GAMMA GLOB FLD ELPH-MCNC: 1.5 G/DL
GAMMA GLOB MFR SERPL ELPH: 21.3 %
IGA SER QL IEP: 55 MG/DL
IGG SER QL IEP: 1185 MG/DL
IGM SER QL IEP: 40 MG/DL
INTERPRETATION SERPL IEP-IMP: NORMAL
KAPPA LC CSF-MCNC: 0.58 MG/DL
KAPPA LC SERPL-MCNC: 51.83 MG/DL
M PROTEIN MFR SERPL ELPH: 18 %
M PROTEIN SPEC IFE-MCNC: NORMAL
MONOCLON BAND OBS SERPL: 1.3 G/DL
PROT SERPL-MCNC: 7.1 G/DL
PROT SERPL-MCNC: 7.1 G/DL

## 2024-05-02 NOTE — CDI QUERY NOTE - NSCDITREATMDFT_GEN_ALL_CORE_HH
Assessment/Plan:      Diagnoses and all orders for this visit:    BMI 35.0-35.9,adult  -     phentermine (ADIPEX-P) 37.5 MG tablet; Take 1 tablet (37.5 mg total) by mouth daily        Morbid obesity. Dosing all possible side effects of the prescribed medications or medications that had been prescribed in the past were reviewed and all questions were answered.  Patient verbalized agreement and understanding of the plan of care as outlined during the office visit today return to office as indicated or sooner if a problem arises.    Subjective:     Patient ID: Jaclyn Cordero is a 35 y.o. female.      Patient is here for routine follow-up.  To review most recent labs.  To also discuss current state of health and any new problems that they may be experiencing.  Patient states that medications taken as prescribed and very well tolerated no new complaints at this time.            Review of Systems   Constitutional:  Negative for appetite change and fever.   HENT:  Negative for sinus pressure and sore throat.    Eyes:  Negative for pain.   Respiratory:  Negative for shortness of breath.    Cardiovascular:  Negative for chest pain.   Gastrointestinal:  Negative for abdominal pain.   Genitourinary:  Negative for dysuria.   Musculoskeletal:  Negative for arthralgias and myalgias.   Skin:  Negative for color change.   Neurological:  Negative for light-headedness.   Psychiatric/Behavioral:  Negative for behavioral problems.          Objective:     Physical Exam  Cardiovascular:      Rate and Rhythm: Normal rate and regular rhythm.      Heart sounds: Normal heart sounds.   Pulmonary:      Effort: Pulmonary effort is normal.      Breath sounds: Normal breath sounds.           
Dr. Vernon,

## 2024-05-03 ENCOUNTER — APPOINTMENT (OUTPATIENT)
Dept: HEMATOLOGY ONCOLOGY | Facility: CLINIC | Age: 84
End: 2024-05-03

## 2024-05-03 ENCOUNTER — APPOINTMENT (OUTPATIENT)
Dept: INFUSION THERAPY | Facility: HOSPITAL | Age: 84
End: 2024-05-03

## 2024-05-10 ENCOUNTER — NON-APPOINTMENT (OUTPATIENT)
Age: 84
End: 2024-05-10

## 2024-06-03 ENCOUNTER — APPOINTMENT (OUTPATIENT)
Dept: ELECTROPHYSIOLOGY | Facility: CLINIC | Age: 84
End: 2024-06-03
Payer: MEDICARE

## 2024-06-03 ENCOUNTER — NON-APPOINTMENT (OUTPATIENT)
Age: 84
End: 2024-06-03

## 2024-06-03 PROCEDURE — 93298 REM INTERROG DEV EVAL SCRMS: CPT

## 2024-07-08 ENCOUNTER — APPOINTMENT (OUTPATIENT)
Dept: ELECTROPHYSIOLOGY | Facility: CLINIC | Age: 84
End: 2024-07-08
Payer: MEDICARE

## 2024-07-08 ENCOUNTER — NON-APPOINTMENT (OUTPATIENT)
Age: 84
End: 2024-07-08

## 2024-07-08 PROCEDURE — 93298 REM INTERROG DEV EVAL SCRMS: CPT

## 2024-07-11 NOTE — H&P ADULT - HISTORY OF PRESENT ILLNESS
79 yo F with PMHx active smoking, HLD, HTN, depression presents to the ER with difficulty finding words that started around 3:30 PM this afternoon and lasted about an hour. Patient is with daughter at bedside and daughter reports they were both shopping at [employer] when the patient had difficulty finding the "right" words to say.  The daughter reports at times, her speech was gargled  She reports that she immediately brought her to the ER. Daughter reports that patient now at her baseline (A+O x 3).  Patient does admit to recent headaches that last for 30 minuted at a time.    Daughter reports that patient sustained a fall last Wednesday.  Went to PMD and had CT head 4/19 which revealed old thalamic and basal ganglia infarct.      Patient denies any dizziness, tremors, loss of strength, chest pain, palps, SOB, abdominal pain, change to bowel or pattern function, LE edema     In ED, vitals stable. WBC 13. Troponin negative. EKG NSR HR 74.  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  Admitted to hospitalist for further workup no fever and no chills.

## 2024-07-16 NOTE — ASU DISCHARGE PLAN (ADULT/PEDIATRIC) - PATIENT EDUCATION MATERIALS PROVIED
ENT ISSUE/POD: 48y s/p TSRP with high flow leak, repaired with duragen inlay, duraguard button, L nasoseptal flap on 7/11/24 POD #5     HPI: 49 y/o F, POD #5 from TSRP with high flow leak, repaired with duragen inlay, duraguard button, L nasoseptal flap. Pt seen and examined at bedside, c/o serosanguinous discharge from nares and blood clots in throat initially but none today. also c/o nasal discomfort. Denies salty or metallic taste, post nasal drip, epistaxis, nasal discharge, headache or pain.         PAST MEDICAL & SURGICAL HISTORY:  Hyperlipidemia      H/O: hysterectomy        Allergies    No Known Allergies    Intolerances      MEDICATIONS  (STANDING):  atorvastatin 20 milliGRAM(s) Oral at bedtime  bisacodyl 5 milliGRAM(s) Oral at bedtime  dexAMETHasone     Tablet   Oral   dexAMETHasone     Tablet 2 milliGRAM(s) Oral every 12 hours  enoxaparin Injectable 40 milliGRAM(s) SubCutaneous <User Schedule>  famotidine    Tablet 20 milliGRAM(s) Oral every 12 hours  insulin lispro (ADMELOG) corrective regimen sliding scale   SubCutaneous Before meals and at bedtime  magnesium citrate Oral Solution 296 milliLiter(s) Oral once  pantoprazole    Tablet 40 milliGRAM(s) Oral before breakfast  polyethylene glycol 3350 17 Gram(s) Oral two times a day  senna 2 Tablet(s) Oral at bedtime  sodium chloride 0.65% Nasal 2 Spray(s) Both Nostrils four times a day    MEDICATIONS  (PRN):  acetaminophen     Tablet .. 975 milliGRAM(s) Oral every 6 hours PRN Temp greater or equal to 38C (100.4F), Moderate Pain (4 - 6)  artificial tears (preservative free) Ophthalmic Solution 1 Drop(s) Both EYES five times a day PRN Dry Eyes  bisacodyl 5 milliGRAM(s) Oral daily PRN Constipation  ondansetron Injectable 4 milliGRAM(s) IV Push every 6 hours PRN Nausea and/or Vomiting      Social History: see consult    Family history: see consult    ROS:   ENT: all negative except as noted in HPI   Pulm: denies SOB, cough, hemoptysis  Neuro: denies numbness/tingling, loss of sensation  Endo: denies heat/cold intolerance, excessive sweating      Vital Signs Last 24 Hrs  T(C): 36.6 (16 Jul 2024 05:00), Max: 36.8 (15 Jul 2024 11:00)  T(F): 97.8 (16 Jul 2024 05:00), Max: 98.2 (15 Jul 2024 11:00)  HR: 61 (16 Jul 2024 05:00) (61 - 85)  BP: 106/72 (16 Jul 2024 05:00) (104/65 - 118/75)  BP(mean): 81 (15 Jul 2024 11:00) (81 - 83)  RR: 18 (16 Jul 2024 05:00) (12 - 18)  SpO2: 96% (16 Jul 2024 05:00) (93% - 97%)    Parameters below as of 16 Jul 2024 05:00  Patient On (Oxygen Delivery Method): room air         07-15    144  |  108  |  15  ----------------------------<  120<H>  4.2   |  24  |  0.62    Ca    9.0      15 Jul 2024 04:14         PHYSICAL EXAM:  Gen: NAD  Skin: No rashes, bruises, or lesions  Head: Normocephalic, Atraumatic  Face: no edema, erythema, or fluctuance. Parotid glands soft without mass  Eyes: no scleral injection  Nose: b/l nares- dried blood. no epistaxis or nasal discharge  Mouth: No Stridor / Drooling / Trismus.  Mucosa moist, tongue/uvula midline, oropharynx clear  Neck: Flat, supple, no lymphadenopathy, trachea midline, no masses  Lymphatic: No lymphadenopathy  Resp: breathing easily, no stridor  Neuro: facial nerve intact, no facial droop         Provider pre-printed instructions given

## 2024-07-20 ENCOUNTER — OUTPATIENT (OUTPATIENT)
Dept: OUTPATIENT SERVICES | Facility: HOSPITAL | Age: 84
LOS: 1 days | Discharge: HOME | End: 2024-07-20

## 2024-07-20 DIAGNOSIS — E53.8 DEFICIENCY OF OTHER SPECIFIED B GROUP VITAMINS: ICD-10-CM

## 2024-07-26 ENCOUNTER — APPOINTMENT (OUTPATIENT)
Dept: HEMATOLOGY ONCOLOGY | Facility: CLINIC | Age: 84
End: 2024-07-26
Payer: MEDICARE

## 2024-07-26 ENCOUNTER — RESULT REVIEW (OUTPATIENT)
Age: 84
End: 2024-07-26

## 2024-07-26 ENCOUNTER — NON-APPOINTMENT (OUTPATIENT)
Age: 84
End: 2024-07-26

## 2024-07-26 VITALS
WEIGHT: 120.15 LBS | DIASTOLIC BLOOD PRESSURE: 78 MMHG | SYSTOLIC BLOOD PRESSURE: 162 MMHG | RESPIRATION RATE: 16 BRPM | TEMPERATURE: 97.2 F | BODY MASS INDEX: 21.28 KG/M2 | HEART RATE: 92 BPM | OXYGEN SATURATION: 96 %

## 2024-07-26 DIAGNOSIS — D51.8 OTHER VITAMIN B12 DEFICIENCY ANEMIAS: ICD-10-CM

## 2024-07-26 DIAGNOSIS — C34.90 MALIGNANT NEOPLASM OF UNSPECIFIED PART OF UNSPECIFIED BRONCHUS OR LUNG: ICD-10-CM

## 2024-07-26 LAB
BASOPHILS # BLD AUTO: 0.07 K/UL — SIGNIFICANT CHANGE UP (ref 0–0.2)
BASOPHILS NFR BLD AUTO: 0.6 % — SIGNIFICANT CHANGE UP (ref 0–2)
EOSINOPHIL # BLD AUTO: 0.18 K/UL — SIGNIFICANT CHANGE UP (ref 0–0.5)
EOSINOPHIL NFR BLD AUTO: 1.6 % — SIGNIFICANT CHANGE UP (ref 0–6)
HCT VFR BLD CALC: 34.5 % — SIGNIFICANT CHANGE UP (ref 34.5–45)
HGB BLD-MCNC: 11.5 G/DL — SIGNIFICANT CHANGE UP (ref 11.5–15.5)
IMM GRANULOCYTES NFR BLD AUTO: 0.4 % — SIGNIFICANT CHANGE UP (ref 0–0.9)
LYMPHOCYTES # BLD AUTO: 2.83 K/UL — SIGNIFICANT CHANGE UP (ref 1–3.3)
LYMPHOCYTES # BLD AUTO: 25.2 % — SIGNIFICANT CHANGE UP (ref 13–44)
MCHC RBC-ENTMCNC: 31.5 PG — SIGNIFICANT CHANGE UP (ref 27–34)
MCHC RBC-ENTMCNC: 33.3 G/DL — SIGNIFICANT CHANGE UP (ref 32–36)
MCV RBC AUTO: 94.5 FL — SIGNIFICANT CHANGE UP (ref 80–100)
MONOCYTES # BLD AUTO: 1.22 K/UL — HIGH (ref 0–0.9)
MONOCYTES NFR BLD AUTO: 10.9 % — SIGNIFICANT CHANGE UP (ref 2–14)
NEUTROPHILS # BLD AUTO: 6.9 K/UL — SIGNIFICANT CHANGE UP (ref 1.8–7.4)
NEUTROPHILS NFR BLD AUTO: 61.3 % — SIGNIFICANT CHANGE UP (ref 43–77)
NRBC # BLD: 0 /100 WBCS — SIGNIFICANT CHANGE UP (ref 0–0)
PLATELET # BLD AUTO: 286 K/UL — SIGNIFICANT CHANGE UP (ref 150–400)
RBC # BLD: 3.65 M/UL — LOW (ref 3.8–5.2)
RBC # FLD: 13.5 % — SIGNIFICANT CHANGE UP (ref 10.3–14.5)
WBC # BLD: 11.24 K/UL — HIGH (ref 3.8–10.5)
WBC # FLD AUTO: 11.24 K/UL — HIGH (ref 3.8–10.5)

## 2024-07-26 PROCEDURE — 99214 OFFICE O/P EST MOD 30 MIN: CPT | Mod: 25

## 2024-07-26 PROCEDURE — 96372 THER/PROPH/DIAG INJ SC/IM: CPT

## 2024-07-26 PROCEDURE — G2211 COMPLEX E/M VISIT ADD ON: CPT

## 2024-07-26 RX ORDER — CYANOCOBALAMIN 1000 UG/ML
1000 INJECTION INTRAMUSCULAR; SUBCUTANEOUS
Qty: 0 | Refills: 0 | Status: COMPLETED | OUTPATIENT
Start: 2024-07-26

## 2024-07-26 RX ADMIN — CYANOCOBALAMIN 0 MCG/ML: 1000 INJECTION INTRAMUSCULAR; SUBCUTANEOUS at 00:00

## 2024-07-26 NOTE — HISTORY OF PRESENT ILLNESS
[100: Normal, no complaints, no evidence of disease.] : 100: Normal, no complaints, no evidence of disease. [ECOG Performance Status: 0 - Fully active, able to carry on all pre-disease performance without restriction] : Performance Status: 0 - Fully active, able to carry on all pre-disease performance without restriction [de-identified] : Ms. FLAKO STUBBS is a 84 year old F who presents for evaluation and management of Stage 1 Lung cancer, Smoldering myeloma and Macrocytic anemia due to B12 deficiency.  She has been on B12 injections every 6-8 weeks for several years with stabilization of her anemia.  She wasn noted to have monoclonal gammopathy and bone marrow with 20% plasma cells with no evidence of bone lesions on either PET/CT in 8/2019 or MRI in 2/2022, no evidence of renal insufficiency or hypercalcemia. She has been seen by Dr. Gopi Norris at [facility] with recommendation to have continued clinical monitoring.  In Summer 2019 she underwent Left upper lobe lung resection for Stage 1A (1.6cm with no LN involvement). She has been followed by Dr. Jean Baptiste with serial CT scans. Most recent CT 7/2022 showed 0.9cm RUL nodule which had enlarged compared to prior CT with recommendation for PET/CT and possible surgical intervention. However the patient and her daughter opted to wait to discuss this with me in 9/2022 prior to proceeding.  Transferred care from [facility] ([facility]) to  [facility] ([facility] [place]) in   9/2022  [de-identified] : 7/26/24 Patient presents today for followup of B12 deficiency, smoldering myeloma and adenoca of lung. Also noted to have iron deficiency anemia; s/p two doses of Feraheme - continues on Slow-FE bid dosing.  She is tolerating well.  No constipation or abdominal discomfort. Feels that her energy is improved - She has not seen her gastro for possible GI workup - continues to receive B12 injections every few months at [facility]  Bone Marrow Biopsy 2/16/2024 - 12% plasma cells (down from 20%)   She was scheduled for VATSprocedure to remove right upper lobe hypermetabolic lung nodule in 2022 but ultimately declined to proceed.  Her PET/CT October 2022 did not reveal any metastatic disease.  She had serial CT Chest 2/2023, 7/2023 and 11/1/23 with showed stability of lung mass. - She has not followed up with Dr. Brooks - was. following with Pulmonary (Dr. Flores) but has opted to stop as well; inhalers prescribed to her but she is unable to afford them due to high cost - Last CT chest4/2024 with few subcm nodules   She denies any fever, chills, bone pain, abdominal pain or appetite loss.

## 2024-07-26 NOTE — ASSESSMENT
[FreeTextEntry1] : 85 yo woman with multifactorial anemia due to B12 deficiency anemia and iron deficiency, Stage 1 Adenocarcinoma of the lung in 2019 and smoldering myeloma  B12 deficiency - Hgb 11.5 g/dl today - resume B12 1000mcg IM today  Iron Deficiency Anemia - s/p Feraheme x 2  - tolerating Slow-FE orally bid  continues - discussed GI workup again - patient is reluctant to undergo any further testing.  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 Gopi Norris to continue observation;  most recent BM Bx-- 12% plasma cells (decreased c/w 2019) - now noted to be 12% on recent bone marrow March 2024.  - K/L ratio decreased now to 89 from a high of 101.  - bone marrow biopsy recommended today and to be scheduled - will continue to monitor for hypercalcemia, renal insufficiency and anemia - continue to monitor immunoglobulin levels and M-spike at least biannually  Lung Adenocarcinoma - Patient wishes to continue with monitoring and does not wish to proceed with any further surgery. -She will followup with pulmonary to medical management of any respiratory symptoms. - last PET imaging in 10/2022; will consider annually more-so to monitor for early bone abnormalities associated with plasma cell disorder evolution into myeloma from smoldering disease - encouraged  follow up with Dr. Flores (pulmonary) - reviewed CT chest done 4/2024; will repeat within the next month; script provided  Patient had the opportunity to have all their questions answered to their satisfaction F/U 4 months

## 2024-07-29 ENCOUNTER — NON-APPOINTMENT (OUTPATIENT)
Age: 84
End: 2024-07-29

## 2024-07-29 DIAGNOSIS — E53.8 DEFICIENCY OF OTHER SPECIFIED B GROUP VITAMINS: ICD-10-CM

## 2024-07-29 LAB
ALBUMIN SERPL ELPH-MCNC: 4.4 G/DL
ALP BLD-CCNC: 75 U/L
ALT SERPL-CCNC: 9 U/L
ANION GAP SERPL CALC-SCNC: 10 MMOL/L
AST SERPL-CCNC: 16 U/L
BILIRUB SERPL-MCNC: 0.3 MG/DL
BUN SERPL-MCNC: 24 MG/DL
CALCIUM SERPL-MCNC: 10.4 MG/DL
CEA SERPL-MCNC: 4.4 NG/ML
CHLORIDE SERPL-SCNC: 105 MMOL/L
CO2 SERPL-SCNC: 23 MMOL/L
CREAT SERPL-MCNC: 0.85 MG/DL
EGFR: 68 ML/MIN/1.73M2
FOLATE SERPL-MCNC: 4.4 NG/ML
GLUCOSE SERPL-MCNC: 98 MG/DL
POTASSIUM SERPL-SCNC: 4.8 MMOL/L
PROT SERPL-MCNC: 7.5 G/DL
SODIUM SERPL-SCNC: 138 MMOL/L
VIT B12 SERPL-MCNC: 782 PG/ML

## 2024-07-30 LAB
ALBUMIN MFR SERPL ELPH: 55.4 %
ALBUMIN SERPL-MCNC: 4.2 G/DL
ALBUMIN/GLOB SERPL: 1.3 RATIO
ALPHA1 GLOB MFR SERPL ELPH: 4.1 %
ALPHA1 GLOB SERPL ELPH-MCNC: 0.3 G/DL
ALPHA2 GLOB MFR SERPL ELPH: 9.7 %
ALPHA2 GLOB SERPL ELPH-MCNC: 0.7 G/DL
B-GLOBULIN MFR SERPL ELPH: 9.3 %
B-GLOBULIN SERPL ELPH-MCNC: 0.7 G/DL
DEPRECATED KAPPA LC FREE/LAMBDA SER: 89.93 RATIO
GAMMA GLOB FLD ELPH-MCNC: 1.6 G/DL
GAMMA GLOB MFR SERPL ELPH: 21.5 %
IGA SER QL IEP: 56 MG/DL
IGG SER QL IEP: 1220 MG/DL
IGM SER QL IEP: 45 MG/DL
INTERPRETATION SERPL IEP-IMP: NORMAL
KAPPA LC CSF-MCNC: 0.56 MG/DL
KAPPA LC SERPL-MCNC: 50.36 MG/DL
M PROTEIN MFR SERPL ELPH: 19.1 %
M PROTEIN SPEC IFE-MCNC: NORMAL
MONOCLON BAND OBS SERPL: 1.4 G/DL
PROT SERPL-MCNC: 7.5 G/DL
PROT SERPL-MCNC: 7.5 G/DL

## 2024-08-01 LAB — METHYLMALONATE SERPL-SCNC: 642 NMOL/L

## 2024-08-01 RX ORDER — FOLIC ACID 1 MG/1
1 TABLET ORAL DAILY
Qty: 90 | Refills: 3 | Status: ACTIVE | COMMUNITY
Start: 2024-07-29 | End: 1900-01-01

## 2024-08-06 ENCOUNTER — OUTPATIENT (OUTPATIENT)
Dept: OUTPATIENT SERVICES | Facility: HOSPITAL | Age: 84
LOS: 1 days | End: 2024-08-06
Payer: MEDICARE

## 2024-08-06 ENCOUNTER — APPOINTMENT (OUTPATIENT)
Dept: CT IMAGING | Facility: HOSPITAL | Age: 84
End: 2024-08-06

## 2024-08-06 DIAGNOSIS — C34.90 MALIGNANT NEOPLASM OF UNSPECIFIED PART OF UNSPECIFIED BRONCHUS OR LUNG: ICD-10-CM

## 2024-08-06 DIAGNOSIS — Z90.2 ACQUIRED ABSENCE OF LUNG [PART OF]: Chronic | ICD-10-CM

## 2024-08-06 PROCEDURE — 71250 CT THORAX DX C-: CPT

## 2024-08-06 PROCEDURE — 71250 CT THORAX DX C-: CPT | Mod: 26,MH

## 2024-08-12 ENCOUNTER — NON-APPOINTMENT (OUTPATIENT)
Age: 84
End: 2024-08-12

## 2024-08-12 ENCOUNTER — APPOINTMENT (OUTPATIENT)
Dept: ELECTROPHYSIOLOGY | Facility: CLINIC | Age: 84
End: 2024-08-12
Payer: MEDICARE

## 2024-08-12 PROCEDURE — 93298 REM INTERROG DEV EVAL SCRMS: CPT

## 2024-09-16 ENCOUNTER — NON-APPOINTMENT (OUTPATIENT)
Age: 84
End: 2024-09-16

## 2024-09-16 ENCOUNTER — APPOINTMENT (OUTPATIENT)
Dept: ELECTROPHYSIOLOGY | Facility: CLINIC | Age: 84
End: 2024-09-16
Payer: MEDICARE

## 2024-09-16 PROCEDURE — 93298 REM INTERROG DEV EVAL SCRMS: CPT

## 2024-10-21 ENCOUNTER — NON-APPOINTMENT (OUTPATIENT)
Age: 84
End: 2024-10-21

## 2024-10-21 ENCOUNTER — APPOINTMENT (OUTPATIENT)
Dept: ELECTROPHYSIOLOGY | Facility: CLINIC | Age: 84
End: 2024-10-21
Payer: MEDICARE

## 2024-10-21 PROCEDURE — 93298 REM INTERROG DEV EVAL SCRMS: CPT

## 2024-11-18 ENCOUNTER — OUTPATIENT (OUTPATIENT)
Dept: OUTPATIENT SERVICES | Facility: HOSPITAL | Age: 84
LOS: 1 days | Discharge: HOME | End: 2024-11-18

## 2024-11-18 DIAGNOSIS — Z90.2 ACQUIRED ABSENCE OF LUNG [PART OF]: Chronic | ICD-10-CM

## 2024-11-18 DIAGNOSIS — E53.8 DEFICIENCY OF OTHER SPECIFIED B GROUP VITAMINS: ICD-10-CM

## 2024-11-22 ENCOUNTER — RESULT REVIEW (OUTPATIENT)
Age: 84
End: 2024-11-22

## 2024-11-22 ENCOUNTER — APPOINTMENT (OUTPATIENT)
Dept: HEMATOLOGY ONCOLOGY | Facility: CLINIC | Age: 84
End: 2024-11-22
Payer: MEDICARE

## 2024-11-22 ENCOUNTER — APPOINTMENT (OUTPATIENT)
Dept: HEMATOLOGY ONCOLOGY | Facility: CLINIC | Age: 84
End: 2024-11-22

## 2024-11-22 VITALS
HEART RATE: 95 BPM | RESPIRATION RATE: 16 BRPM | DIASTOLIC BLOOD PRESSURE: 79 MMHG | BODY MASS INDEX: 22.97 KG/M2 | WEIGHT: 123.24 LBS | SYSTOLIC BLOOD PRESSURE: 161 MMHG | OXYGEN SATURATION: 99 % | TEMPERATURE: 97.3 F | HEIGHT: 61.26 IN

## 2024-11-22 DIAGNOSIS — D50.8 OTHER IRON DEFICIENCY ANEMIAS: ICD-10-CM

## 2024-11-22 DIAGNOSIS — D51.8 OTHER VITAMIN B12 DEFICIENCY ANEMIAS: ICD-10-CM

## 2024-11-22 DIAGNOSIS — C34.90 MALIGNANT NEOPLASM OF UNSPECIFIED PART OF UNSPECIFIED BRONCHUS OR LUNG: ICD-10-CM

## 2024-11-22 LAB
BASOPHILS # BLD AUTO: 0.07 K/UL — SIGNIFICANT CHANGE UP (ref 0–0.2)
BASOPHILS NFR BLD AUTO: 0.6 % — SIGNIFICANT CHANGE UP (ref 0–2)
EOSINOPHIL # BLD AUTO: 0.12 K/UL — SIGNIFICANT CHANGE UP (ref 0–0.5)
EOSINOPHIL NFR BLD AUTO: 1.1 % — SIGNIFICANT CHANGE UP (ref 0–6)
HCT VFR BLD CALC: 35.9 % — SIGNIFICANT CHANGE UP (ref 34.5–45)
HGB BLD-MCNC: 11.6 G/DL — SIGNIFICANT CHANGE UP (ref 11.5–15.5)
IMM GRANULOCYTES NFR BLD AUTO: 0.3 % — SIGNIFICANT CHANGE UP (ref 0–0.9)
LYMPHOCYTES # BLD AUTO: 2.7 K/UL — SIGNIFICANT CHANGE UP (ref 1–3.3)
LYMPHOCYTES # BLD AUTO: 24.3 % — SIGNIFICANT CHANGE UP (ref 13–44)
MCHC RBC-ENTMCNC: 30.3 PG — SIGNIFICANT CHANGE UP (ref 27–34)
MCHC RBC-ENTMCNC: 32.3 G/DL — SIGNIFICANT CHANGE UP (ref 32–36)
MCV RBC AUTO: 93.7 FL — SIGNIFICANT CHANGE UP (ref 80–100)
MONOCYTES # BLD AUTO: 1.03 K/UL — HIGH (ref 0–0.9)
MONOCYTES NFR BLD AUTO: 9.3 % — SIGNIFICANT CHANGE UP (ref 2–14)
NEUTROPHILS # BLD AUTO: 7.18 K/UL — SIGNIFICANT CHANGE UP (ref 1.8–7.4)
NEUTROPHILS NFR BLD AUTO: 64.4 % — SIGNIFICANT CHANGE UP (ref 43–77)
NRBC # BLD: 0 /100 WBCS — SIGNIFICANT CHANGE UP (ref 0–0)
NRBC BLD-RTO: 0 /100 WBCS — SIGNIFICANT CHANGE UP (ref 0–0)
PLATELET # BLD AUTO: 303 K/UL — SIGNIFICANT CHANGE UP (ref 150–400)
RBC # BLD: 3.83 M/UL — SIGNIFICANT CHANGE UP (ref 3.8–5.2)
RBC # FLD: 14 % — SIGNIFICANT CHANGE UP (ref 10.3–14.5)
WBC # BLD: 11.13 K/UL — HIGH (ref 3.8–10.5)
WBC # FLD AUTO: 11.13 K/UL — HIGH (ref 3.8–10.5)

## 2024-11-22 PROCEDURE — G2211 COMPLEX E/M VISIT ADD ON: CPT

## 2024-11-22 PROCEDURE — 99214 OFFICE O/P EST MOD 30 MIN: CPT | Mod: 25

## 2024-11-22 PROCEDURE — 96372 THER/PROPH/DIAG INJ SC/IM: CPT

## 2024-11-22 RX ORDER — CYANOCOBALAMIN 1000 UG/ML
1000 INJECTION, SOLUTION INTRAMUSCULAR; SUBCUTANEOUS
Qty: 0 | Refills: 0 | Status: COMPLETED | OUTPATIENT
Start: 2024-11-22

## 2024-11-22 RX ADMIN — CYANOCOBALAMIN 1 MCG/ML: 1000 INJECTION, SOLUTION INTRAMUSCULAR; SUBCUTANEOUS at 00:00

## 2024-11-22 RX ADMIN — CYANOCOBALAMIN 0 MCG/ML: 1000 INJECTION, SOLUTION INTRAMUSCULAR; SUBCUTANEOUS at 00:00

## 2024-11-23 LAB
ALBUMIN SERPL ELPH-MCNC: 4.4 G/DL
ALP BLD-CCNC: 81 U/L
ALT SERPL-CCNC: 11 U/L
ANION GAP SERPL CALC-SCNC: 13 MMOL/L
AST SERPL-CCNC: 19 U/L
BILIRUB SERPL-MCNC: 0.4 MG/DL
BUN SERPL-MCNC: 16 MG/DL
CALCIUM SERPL-MCNC: 10.9 MG/DL
CEA SERPL-MCNC: 4.4 NG/ML
CHLORIDE SERPL-SCNC: 104 MMOL/L
CO2 SERPL-SCNC: 23 MMOL/L
CREAT SERPL-MCNC: 0.83 MG/DL
EGFR: 70 ML/MIN/1.73M2
FERRITIN SERPL-MCNC: 55 NG/ML
FOLATE SERPL-MCNC: 14.7 NG/ML
GLUCOSE SERPL-MCNC: 108 MG/DL
IRON SATN MFR SERPL: 70 %
IRON SERPL-MCNC: 252 UG/DL
POTASSIUM SERPL-SCNC: 4.4 MMOL/L
PROT SERPL-MCNC: 7.5 G/DL
SODIUM SERPL-SCNC: 140 MMOL/L
TIBC SERPL-MCNC: 360 UG/DL
UIBC SERPL-MCNC: 109 UG/DL
VIT B12 SERPL-MCNC: 675 PG/ML

## 2024-11-25 ENCOUNTER — APPOINTMENT (OUTPATIENT)
Dept: ELECTROPHYSIOLOGY | Facility: CLINIC | Age: 84
End: 2024-11-25
Payer: MEDICARE

## 2024-11-25 ENCOUNTER — NON-APPOINTMENT (OUTPATIENT)
Age: 84
End: 2024-11-25

## 2024-11-25 LAB
ALBUMIN MFR SERPL ELPH: 56.2 %
ALBUMIN SERPL-MCNC: 4.2 G/DL
ALBUMIN/GLOB SERPL: 1.3 RATIO
ALPHA1 GLOB MFR SERPL ELPH: 3.8 %
ALPHA1 GLOB SERPL ELPH-MCNC: 0.3 G/DL
ALPHA2 GLOB MFR SERPL ELPH: 8.8 %
ALPHA2 GLOB SERPL ELPH-MCNC: 0.7 G/DL
B-GLOBULIN MFR SERPL ELPH: 9.3 %
B-GLOBULIN SERPL ELPH-MCNC: 0.7 G/DL
DEPRECATED KAPPA LC FREE/LAMBDA SER: 73.46 RATIO
GAMMA GLOB FLD ELPH-MCNC: 1.6 G/DL
GAMMA GLOB MFR SERPL ELPH: 21.9 %
IGA SER QL IEP: 64 MG/DL
IGG SER QL IEP: 1318 MG/DL
IGM SER QL IEP: 45 MG/DL
INTERPRETATION SERPL IEP-IMP: NORMAL
KAPPA LC CSF-MCNC: 0.59 MG/DL
KAPPA LC SERPL-MCNC: 43.34 MG/DL
M PROTEIN MFR SERPL ELPH: 19.9 %
M PROTEIN SPEC IFE-MCNC: NORMAL
MONOCLON BAND OBS SERPL: 1.5 G/DL
PROT SERPL-MCNC: 7.5 G/DL
PROT SERPL-MCNC: 7.5 G/DL

## 2024-11-25 PROCEDURE — 93298 REM INTERROG DEV EVAL SCRMS: CPT

## 2024-12-30 ENCOUNTER — NON-APPOINTMENT (OUTPATIENT)
Age: 84
End: 2024-12-30

## 2024-12-30 ENCOUNTER — APPOINTMENT (OUTPATIENT)
Dept: ELECTROPHYSIOLOGY | Facility: CLINIC | Age: 84
End: 2024-12-30
Payer: MEDICARE

## 2024-12-30 PROCEDURE — 93298 REM INTERROG DEV EVAL SCRMS: CPT

## 2025-02-03 ENCOUNTER — APPOINTMENT (OUTPATIENT)
Dept: ELECTROPHYSIOLOGY | Facility: CLINIC | Age: 85
End: 2025-02-03
Payer: MEDICARE

## 2025-02-03 ENCOUNTER — NON-APPOINTMENT (OUTPATIENT)
Age: 85
End: 2025-02-03

## 2025-02-03 PROCEDURE — 93298 REM INTERROG DEV EVAL SCRMS: CPT

## 2025-02-21 ENCOUNTER — APPOINTMENT (OUTPATIENT)
Dept: INFUSION THERAPY | Facility: HOSPITAL | Age: 85
End: 2025-02-21

## 2025-03-10 ENCOUNTER — NON-APPOINTMENT (OUTPATIENT)
Age: 85
End: 2025-03-10

## 2025-03-10 ENCOUNTER — APPOINTMENT (OUTPATIENT)
Dept: ELECTROPHYSIOLOGY | Facility: CLINIC | Age: 85
End: 2025-03-10
Payer: MEDICARE

## 2025-03-10 PROCEDURE — 93298 REM INTERROG DEV EVAL SCRMS: CPT

## 2025-04-14 ENCOUNTER — APPOINTMENT (OUTPATIENT)
Dept: ELECTROPHYSIOLOGY | Facility: CLINIC | Age: 85
End: 2025-04-14
Payer: MEDICARE

## 2025-04-14 ENCOUNTER — NON-APPOINTMENT (OUTPATIENT)
Age: 85
End: 2025-04-14

## 2025-04-14 PROCEDURE — 93298 REM INTERROG DEV EVAL SCRMS: CPT

## 2025-05-19 ENCOUNTER — NON-APPOINTMENT (OUTPATIENT)
Age: 85
End: 2025-05-19

## 2025-05-19 ENCOUNTER — APPOINTMENT (OUTPATIENT)
Dept: ELECTROPHYSIOLOGY | Facility: CLINIC | Age: 85
End: 2025-05-19
Payer: MEDICARE

## 2025-05-19 PROCEDURE — 93298 REM INTERROG DEV EVAL SCRMS: CPT

## 2025-05-24 ENCOUNTER — OUTPATIENT (OUTPATIENT)
Dept: OUTPATIENT SERVICES | Facility: HOSPITAL | Age: 85
LOS: 1 days | Discharge: HOME | End: 2025-05-24

## 2025-05-24 DIAGNOSIS — E53.8 DEFICIENCY OF OTHER SPECIFIED B GROUP VITAMINS: ICD-10-CM

## 2025-05-24 DIAGNOSIS — Z90.2 ACQUIRED ABSENCE OF LUNG [PART OF]: Chronic | ICD-10-CM

## 2025-05-30 ENCOUNTER — APPOINTMENT (OUTPATIENT)
Dept: INFUSION THERAPY | Facility: HOSPITAL | Age: 85
End: 2025-05-30

## 2025-06-14 ENCOUNTER — OUTPATIENT (OUTPATIENT)
Dept: OUTPATIENT SERVICES | Facility: HOSPITAL | Age: 85
LOS: 1 days | Discharge: HOME | End: 2025-06-14

## 2025-06-14 DIAGNOSIS — E53.8 DEFICIENCY OF OTHER SPECIFIED B GROUP VITAMINS: ICD-10-CM

## 2025-06-14 DIAGNOSIS — Z90.2 ACQUIRED ABSENCE OF LUNG [PART OF]: Chronic | ICD-10-CM

## 2025-06-17 ENCOUNTER — NON-APPOINTMENT (OUTPATIENT)
Age: 85
End: 2025-06-17

## 2025-06-17 ENCOUNTER — APPOINTMENT (OUTPATIENT)
Dept: HEMATOLOGY ONCOLOGY | Facility: CLINIC | Age: 85
End: 2025-06-17
Payer: MEDICARE

## 2025-06-17 ENCOUNTER — RESULT REVIEW (OUTPATIENT)
Age: 85
End: 2025-06-17

## 2025-06-17 ENCOUNTER — APPOINTMENT (OUTPATIENT)
Dept: INFUSION THERAPY | Facility: HOSPITAL | Age: 85
End: 2025-06-17

## 2025-06-17 ENCOUNTER — APPOINTMENT (OUTPATIENT)
Dept: HEMATOLOGY ONCOLOGY | Facility: CLINIC | Age: 85
End: 2025-06-17

## 2025-06-17 VITALS
RESPIRATION RATE: 16 BRPM | DIASTOLIC BLOOD PRESSURE: 87 MMHG | HEART RATE: 86 BPM | SYSTOLIC BLOOD PRESSURE: 149 MMHG | WEIGHT: 122.33 LBS | OXYGEN SATURATION: 98 % | TEMPERATURE: 97.3 F | BODY MASS INDEX: 22.92 KG/M2

## 2025-06-17 LAB
BASOPHILS # BLD AUTO: 0.07 K/UL — SIGNIFICANT CHANGE UP (ref 0–0.2)
BASOPHILS NFR BLD AUTO: 0.5 % — SIGNIFICANT CHANGE UP (ref 0–2)
EOSINOPHIL # BLD AUTO: 0.22 K/UL — SIGNIFICANT CHANGE UP (ref 0–0.5)
EOSINOPHIL NFR BLD AUTO: 1.7 % — SIGNIFICANT CHANGE UP (ref 0–6)
HCT VFR BLD CALC: 38 % — SIGNIFICANT CHANGE UP (ref 34.5–45)
HGB BLD-MCNC: 12.4 G/DL — SIGNIFICANT CHANGE UP (ref 11.5–15.5)
IMM GRANULOCYTES NFR BLD AUTO: 0.5 % — SIGNIFICANT CHANGE UP (ref 0–0.9)
LYMPHOCYTES # BLD AUTO: 24.5 % — SIGNIFICANT CHANGE UP (ref 13–44)
LYMPHOCYTES # BLD AUTO: 3.26 K/UL — SIGNIFICANT CHANGE UP (ref 1–3.3)
MCHC RBC-ENTMCNC: 30.6 PG — SIGNIFICANT CHANGE UP (ref 27–34)
MCHC RBC-ENTMCNC: 32.6 G/DL — SIGNIFICANT CHANGE UP (ref 32–36)
MCV RBC AUTO: 93.8 FL — SIGNIFICANT CHANGE UP (ref 80–100)
MONOCYTES # BLD AUTO: 1.65 K/UL — HIGH (ref 0–0.9)
MONOCYTES NFR BLD AUTO: 12.4 % — SIGNIFICANT CHANGE UP (ref 2–14)
NEUTROPHILS # BLD AUTO: 8.04 K/UL — HIGH (ref 1.8–7.4)
NEUTROPHILS NFR BLD AUTO: 60.4 % — SIGNIFICANT CHANGE UP (ref 43–77)
NRBC BLD AUTO-RTO: 0 /100 WBCS — SIGNIFICANT CHANGE UP (ref 0–0)
PLATELET # BLD AUTO: 288 K/UL — SIGNIFICANT CHANGE UP (ref 150–400)
RBC # BLD: 4.05 M/UL — SIGNIFICANT CHANGE UP (ref 3.8–5.2)
RBC # FLD: 13.2 % — SIGNIFICANT CHANGE UP (ref 10.3–14.5)
WBC # BLD: 13.31 K/UL — HIGH (ref 3.8–10.5)
WBC # FLD AUTO: 13.31 K/UL — HIGH (ref 3.8–10.5)

## 2025-06-17 PROCEDURE — G2211 COMPLEX E/M VISIT ADD ON: CPT

## 2025-06-17 PROCEDURE — 99214 OFFICE O/P EST MOD 30 MIN: CPT

## 2025-06-18 LAB
ALBUMIN SERPL ELPH-MCNC: 4.4 G/DL
ALP BLD-CCNC: 90 U/L
ALT SERPL-CCNC: 18 U/L
ANION GAP SERPL CALC-SCNC: 13 MMOL/L
AST SERPL-CCNC: 26 U/L
BILIRUB SERPL-MCNC: 0.2 MG/DL
BUN SERPL-MCNC: 31 MG/DL
CALCIUM SERPL-MCNC: 10.8 MG/DL
CHLORIDE SERPL-SCNC: 105 MMOL/L
CO2 SERPL-SCNC: 24 MMOL/L
CREAT SERPL-MCNC: 0.9 MG/DL
EGFRCR SERPLBLD CKD-EPI 2021: 64 ML/MIN/1.73M2
FERRITIN SERPL-MCNC: 73 NG/ML
FOLATE SERPL-MCNC: >20 NG/ML
GLUCOSE SERPL-MCNC: 92 MG/DL
IRON SATN MFR SERPL: 18 %
IRON SERPL-MCNC: 58 UG/DL
POTASSIUM SERPL-SCNC: 4.9 MMOL/L
PROT SERPL-MCNC: 7.9 G/DL
SODIUM SERPL-SCNC: 142 MMOL/L
TIBC SERPL-MCNC: 321 UG/DL
UIBC SERPL-MCNC: 263 UG/DL
VIT B12 SERPL-MCNC: 523 PG/ML

## 2025-06-20 ENCOUNTER — APPOINTMENT (OUTPATIENT)
Dept: ELECTROPHYSIOLOGY | Facility: CLINIC | Age: 85
End: 2025-06-20

## 2025-06-20 ENCOUNTER — NON-APPOINTMENT (OUTPATIENT)
Age: 85
End: 2025-06-20

## 2025-06-20 LAB
ALBUMIN MFR SERPL ELPH: 55.4 %
ALBUMIN SERPL-MCNC: 4.4 G/DL
ALBUMIN/GLOB SERPL: 1.3 RATIO
ALPHA1 GLOB MFR SERPL ELPH: 4.5 %
ALPHA1 GLOB SERPL ELPH-MCNC: 0.4 G/DL
ALPHA2 GLOB MFR SERPL ELPH: 10.1 %
ALPHA2 GLOB SERPL ELPH-MCNC: 0.8 G/DL
B-GLOBULIN MFR SERPL ELPH: 8.9 %
B-GLOBULIN SERPL ELPH-MCNC: 0.7 G/DL
DEPRECATED KAPPA LC FREE/LAMBDA SER: 75.02 RATIO
GAMMA GLOB FLD ELPH-MCNC: 1.7 G/DL
GAMMA GLOB MFR SERPL ELPH: 21.1 %
IGA SERPL-MCNC: 59 MG/DL
IGG SERPL-MCNC: 1314 MG/DL
IGM SERPL-MCNC: 45 MG/DL
INTERPRETATION SERPL IEP-IMP: NORMAL
KAPPA LC CSF-MCNC: 0.66 MG/DL
KAPPA LC SERPL-MCNC: 49.51 MG/DL
M PROTEIN MFR SERPL ELPH: 19.7 %
M PROTEIN SPEC IFE-MCNC: NORMAL
MONOCLON BAND OBS SERPL: 1.6 G/DL
PROT SERPL-MCNC: 7.9 G/DL
PROT SERPL-MCNC: 7.9 G/DL

## 2025-06-20 PROCEDURE — 93298 REM INTERROG DEV EVAL SCRMS: CPT

## 2025-07-25 ENCOUNTER — APPOINTMENT (OUTPATIENT)
Dept: ELECTROPHYSIOLOGY | Facility: CLINIC | Age: 85
End: 2025-07-25
Payer: MEDICARE

## 2025-07-25 ENCOUNTER — NON-APPOINTMENT (OUTPATIENT)
Age: 85
End: 2025-07-25

## 2025-07-25 PROCEDURE — 93298 REM INTERROG DEV EVAL SCRMS: CPT

## 2025-08-29 ENCOUNTER — APPOINTMENT (OUTPATIENT)
Dept: ELECTROPHYSIOLOGY | Facility: CLINIC | Age: 85
End: 2025-08-29

## 2025-08-29 PROCEDURE — 93298 REM INTERROG DEV EVAL SCRMS: CPT

## 2025-09-08 ENCOUNTER — APPOINTMENT (OUTPATIENT)
Dept: CT IMAGING | Facility: HOSPITAL | Age: 85
End: 2025-09-08
Payer: MEDICARE

## 2025-09-08 PROCEDURE — 70450 CT HEAD/BRAIN W/O DYE: CPT | Mod: 26
